# Patient Record
Sex: FEMALE | Race: WHITE | URBAN - METROPOLITAN AREA
[De-identification: names, ages, dates, MRNs, and addresses within clinical notes are randomized per-mention and may not be internally consistent; named-entity substitution may affect disease eponyms.]

---

## 2017-04-27 ENCOUNTER — OFFICE VISIT - HEALTHEAST (OUTPATIENT)
Dept: FAMILY MEDICINE | Facility: CLINIC | Age: 47
End: 2017-04-27

## 2017-04-27 ENCOUNTER — COMMUNICATION - HEALTHEAST (OUTPATIENT)
Dept: TELEHEALTH | Facility: CLINIC | Age: 47
End: 2017-04-27

## 2017-04-27 DIAGNOSIS — F33.1 MDD (MAJOR DEPRESSIVE DISORDER), RECURRENT EPISODE, MODERATE (H): ICD-10-CM

## 2017-04-27 DIAGNOSIS — F90.0 ATTENTION DEFICIT HYPERACTIVITY DISORDER (ADHD), PREDOMINANTLY INATTENTIVE TYPE: ICD-10-CM

## 2017-04-27 DIAGNOSIS — F10.21 ALCOHOL DEPENDENCE IN REMISSION (H): ICD-10-CM

## 2017-04-27 ASSESSMENT — MIFFLIN-ST. JEOR: SCORE: 1218.39

## 2017-05-31 ENCOUNTER — RECORDS - HEALTHEAST (OUTPATIENT)
Dept: ADMINISTRATIVE | Facility: OTHER | Age: 47
End: 2017-05-31

## 2017-06-27 ENCOUNTER — RECORDS - HEALTHEAST (OUTPATIENT)
Dept: ADMINISTRATIVE | Facility: OTHER | Age: 47
End: 2017-06-27

## 2017-06-28 ENCOUNTER — COMMUNICATION - HEALTHEAST (OUTPATIENT)
Dept: FAMILY MEDICINE | Facility: CLINIC | Age: 47
End: 2017-06-28

## 2017-06-28 DIAGNOSIS — F13.10 BENZODIAZEPINE ABUSE, EPISODIC (H): ICD-10-CM

## 2017-06-28 DIAGNOSIS — F33.1 MDD (MAJOR DEPRESSIVE DISORDER), RECURRENT EPISODE, MODERATE (H): ICD-10-CM

## 2017-06-28 DIAGNOSIS — F32.89 DEPRESSIVE DISORDER, NOT ELSEWHERE CLASSIFIED: ICD-10-CM

## 2017-06-29 ENCOUNTER — COMMUNICATION - HEALTHEAST (OUTPATIENT)
Dept: FAMILY MEDICINE | Facility: CLINIC | Age: 47
End: 2017-06-29

## 2017-06-29 DIAGNOSIS — F33.1 MDD (MAJOR DEPRESSIVE DISORDER), RECURRENT EPISODE, MODERATE (H): ICD-10-CM

## 2017-06-29 DIAGNOSIS — F41.1 ANXIETY STATE: ICD-10-CM

## 2017-07-13 ENCOUNTER — OFFICE VISIT - HEALTHEAST (OUTPATIENT)
Dept: FAMILY MEDICINE | Facility: CLINIC | Age: 47
End: 2017-07-13

## 2017-07-13 ENCOUNTER — COMMUNICATION - HEALTHEAST (OUTPATIENT)
Dept: TELEHEALTH | Facility: CLINIC | Age: 47
End: 2017-07-13

## 2017-07-13 DIAGNOSIS — J45.909 ASTHMA: ICD-10-CM

## 2017-07-13 DIAGNOSIS — F33.1 MDD (MAJOR DEPRESSIVE DISORDER), RECURRENT EPISODE, MODERATE (H): ICD-10-CM

## 2017-07-13 DIAGNOSIS — F41.1 ANXIETY STATE: ICD-10-CM

## 2017-07-13 DIAGNOSIS — F10.21 ALCOHOL DEPENDENCE IN REMISSION (H): ICD-10-CM

## 2017-07-13 DIAGNOSIS — Z01.818 PREOP EXAMINATION: ICD-10-CM

## 2017-07-13 ASSESSMENT — MIFFLIN-ST. JEOR: SCORE: 1204.78

## 2017-07-14 ENCOUNTER — COMMUNICATION - HEALTHEAST (OUTPATIENT)
Dept: FAMILY MEDICINE | Facility: CLINIC | Age: 47
End: 2017-07-14

## 2017-07-18 ENCOUNTER — COMMUNICATION - HEALTHEAST (OUTPATIENT)
Dept: FAMILY MEDICINE | Facility: CLINIC | Age: 47
End: 2017-07-18

## 2017-07-18 DIAGNOSIS — F90.0 ATTENTION DEFICIT HYPERACTIVITY DISORDER (ADHD), PREDOMINANTLY INATTENTIVE TYPE: ICD-10-CM

## 2017-07-19 ENCOUNTER — ANESTHESIA - HEALTHEAST (OUTPATIENT)
Dept: SURGERY | Facility: HOSPITAL | Age: 47
End: 2017-07-19

## 2017-07-19 ASSESSMENT — MIFFLIN-ST. JEOR: SCORE: 1204.78

## 2017-07-20 ENCOUNTER — SURGERY - HEALTHEAST (OUTPATIENT)
Dept: SURGERY | Facility: HOSPITAL | Age: 47
End: 2017-07-20

## 2017-07-20 ENCOUNTER — COMMUNICATION - HEALTHEAST (OUTPATIENT)
Dept: FAMILY MEDICINE | Facility: CLINIC | Age: 47
End: 2017-07-20

## 2017-07-21 ENCOUNTER — COMMUNICATION - HEALTHEAST (OUTPATIENT)
Dept: FAMILY MEDICINE | Facility: CLINIC | Age: 47
End: 2017-07-21

## 2017-07-26 ENCOUNTER — COMMUNICATION - HEALTHEAST (OUTPATIENT)
Dept: FAMILY MEDICINE | Facility: CLINIC | Age: 47
End: 2017-07-26

## 2017-08-08 ENCOUNTER — COMMUNICATION - HEALTHEAST (OUTPATIENT)
Dept: FAMILY MEDICINE | Facility: CLINIC | Age: 47
End: 2017-08-08

## 2017-08-08 DIAGNOSIS — F90.0 ATTENTION DEFICIT HYPERACTIVITY DISORDER (ADHD), PREDOMINANTLY INATTENTIVE TYPE: ICD-10-CM

## 2017-08-10 ENCOUNTER — COMMUNICATION - HEALTHEAST (OUTPATIENT)
Dept: FAMILY MEDICINE | Facility: CLINIC | Age: 47
End: 2017-08-10

## 2017-08-10 DIAGNOSIS — F32.89 DEPRESSIVE DISORDER, NOT ELSEWHERE CLASSIFIED: ICD-10-CM

## 2017-09-08 ENCOUNTER — COMMUNICATION - HEALTHEAST (OUTPATIENT)
Dept: FAMILY MEDICINE | Facility: CLINIC | Age: 47
End: 2017-09-08

## 2017-09-08 DIAGNOSIS — F90.0 ATTENTION DEFICIT HYPERACTIVITY DISORDER (ADHD), PREDOMINANTLY INATTENTIVE TYPE: ICD-10-CM

## 2017-09-21 ENCOUNTER — OFFICE VISIT - HEALTHEAST (OUTPATIENT)
Dept: FAMILY MEDICINE | Facility: CLINIC | Age: 47
End: 2017-09-21

## 2017-09-21 DIAGNOSIS — F10.21 ALCOHOL DEPENDENCE IN REMISSION (H): ICD-10-CM

## 2017-09-21 DIAGNOSIS — F32.89 DEPRESSIVE DISORDER, NOT ELSEWHERE CLASSIFIED: ICD-10-CM

## 2017-09-21 DIAGNOSIS — F90.0 ATTENTION DEFICIT HYPERACTIVITY DISORDER (ADHD), PREDOMINANTLY INATTENTIVE TYPE: ICD-10-CM

## 2017-09-21 DIAGNOSIS — Z72.0 TOBACCO ABUSE: ICD-10-CM

## 2017-09-21 DIAGNOSIS — K08.89 TOOTH PAIN: ICD-10-CM

## 2017-09-21 DIAGNOSIS — F41.1 ANXIETY STATE: ICD-10-CM

## 2017-09-21 DIAGNOSIS — Z12.31 VISIT FOR SCREENING MAMMOGRAM: ICD-10-CM

## 2017-09-21 DIAGNOSIS — Z23 IMMUNIZATION DUE: ICD-10-CM

## 2017-09-22 ENCOUNTER — RECORDS - HEALTHEAST (OUTPATIENT)
Dept: ADMINISTRATIVE | Facility: OTHER | Age: 47
End: 2017-09-22

## 2017-09-25 ENCOUNTER — COMMUNICATION - HEALTHEAST (OUTPATIENT)
Dept: FAMILY MEDICINE | Facility: CLINIC | Age: 47
End: 2017-09-25

## 2017-09-25 DIAGNOSIS — L70.9 ACNE: ICD-10-CM

## 2017-09-25 DIAGNOSIS — K08.89 PAIN, DENTAL: ICD-10-CM

## 2017-10-09 ENCOUNTER — COMMUNICATION - HEALTHEAST (OUTPATIENT)
Dept: FAMILY MEDICINE | Facility: CLINIC | Age: 47
End: 2017-10-09

## 2017-10-09 DIAGNOSIS — K08.89 PAIN, DENTAL: ICD-10-CM

## 2017-10-12 ENCOUNTER — COMMUNICATION - HEALTHEAST (OUTPATIENT)
Dept: FAMILY MEDICINE | Facility: CLINIC | Age: 47
End: 2017-10-12

## 2017-10-16 ENCOUNTER — COMMUNICATION - HEALTHEAST (OUTPATIENT)
Dept: SCHEDULING | Facility: CLINIC | Age: 47
End: 2017-10-16

## 2017-11-07 ENCOUNTER — COMMUNICATION - HEALTHEAST (OUTPATIENT)
Dept: FAMILY MEDICINE | Facility: CLINIC | Age: 47
End: 2017-11-07

## 2017-11-09 ENCOUNTER — COMMUNICATION - HEALTHEAST (OUTPATIENT)
Dept: FAMILY MEDICINE | Facility: CLINIC | Age: 47
End: 2017-11-09

## 2017-11-29 ENCOUNTER — COMMUNICATION - HEALTHEAST (OUTPATIENT)
Dept: FAMILY MEDICINE | Facility: CLINIC | Age: 47
End: 2017-11-29

## 2017-12-13 ENCOUNTER — TRANSFERRED RECORDS (OUTPATIENT)
Dept: HEALTH INFORMATION MANAGEMENT | Facility: CLINIC | Age: 47
End: 2017-12-13

## 2017-12-14 ENCOUNTER — HOSPITAL ENCOUNTER (INPATIENT)
Facility: CLINIC | Age: 47
LOS: 7 days | Discharge: HOME | End: 2017-12-21
Attending: PSYCHIATRY & NEUROLOGY | Admitting: PSYCHIATRY & NEUROLOGY
Payer: MEDICAID

## 2017-12-14 DIAGNOSIS — F13.20 SEDATIVE, HYPNOTIC OR ANXIOLYTIC DEPENDENCE (H): Primary | ICD-10-CM

## 2017-12-14 PROBLEM — T42.71XA: Status: ACTIVE | Noted: 2017-12-14

## 2017-12-14 PROCEDURE — 25000132 ZZH RX MED GY IP 250 OP 250 PS 637: Performed by: NURSE PRACTITIONER

## 2017-12-14 PROCEDURE — HZ2ZZZZ DETOXIFICATION SERVICES FOR SUBSTANCE ABUSE TREATMENT: ICD-10-PCS | Performed by: PSYCHIATRY & NEUROLOGY

## 2017-12-14 PROCEDURE — 12800012 ZZH R&B CD MH INTERMEDIATE ADULT

## 2017-12-14 RX ORDER — ACETAMINOPHEN 325 MG/1
650 TABLET ORAL EVERY 4 HOURS PRN
Status: DISCONTINUED | OUTPATIENT
Start: 2017-12-14 | End: 2017-12-21 | Stop reason: HOSPADM

## 2017-12-14 RX ORDER — ONDANSETRON 4 MG/1
4 TABLET, ORALLY DISINTEGRATING ORAL EVERY 6 HOURS PRN
Status: DISCONTINUED | OUTPATIENT
Start: 2017-12-14 | End: 2017-12-21 | Stop reason: HOSPADM

## 2017-12-14 RX ORDER — HYDROXYZINE HYDROCHLORIDE 25 MG/1
25-50 TABLET, FILM COATED ORAL EVERY 4 HOURS PRN
Status: DISCONTINUED | OUTPATIENT
Start: 2017-12-14 | End: 2017-12-21 | Stop reason: HOSPADM

## 2017-12-14 RX ORDER — NICOTINE 21 MG/24HR
1 PATCH, TRANSDERMAL 24 HOURS TRANSDERMAL DAILY
Status: DISCONTINUED | OUTPATIENT
Start: 2017-12-15 | End: 2017-12-21 | Stop reason: HOSPADM

## 2017-12-14 RX ORDER — ESCITALOPRAM OXALATE 10 MG/1
10 TABLET ORAL DAILY
Status: DISCONTINUED | OUTPATIENT
Start: 2017-12-15 | End: 2017-12-15

## 2017-12-14 RX ORDER — PHENOBARBITAL 32.4 MG/1
32.4 TABLET ORAL AT BEDTIME
Status: DISCONTINUED | OUTPATIENT
Start: 2017-12-14 | End: 2017-12-16

## 2017-12-14 RX ORDER — ALUMINA, MAGNESIA, AND SIMETHICONE 2400; 2400; 240 MG/30ML; MG/30ML; MG/30ML
30 SUSPENSION ORAL EVERY 4 HOURS PRN
Status: DISCONTINUED | OUTPATIENT
Start: 2017-12-14 | End: 2017-12-21 | Stop reason: HOSPADM

## 2017-12-14 RX ORDER — BISACODYL 10 MG
10 SUPPOSITORY, RECTAL RECTAL DAILY PRN
Status: DISCONTINUED | OUTPATIENT
Start: 2017-12-14 | End: 2017-12-21 | Stop reason: HOSPADM

## 2017-12-14 RX ORDER — TRAZODONE HYDROCHLORIDE 150 MG/1
150 TABLET ORAL AT BEDTIME
Status: DISCONTINUED | OUTPATIENT
Start: 2017-12-14 | End: 2017-12-21 | Stop reason: HOSPADM

## 2017-12-14 RX ORDER — PHENOBARBITAL 64.8 MG/1
64.8 TABLET ORAL ONCE
Status: COMPLETED | OUTPATIENT
Start: 2017-12-14 | End: 2017-12-14

## 2017-12-14 RX ORDER — PHENOBARBITAL 64.8 MG/1
64.8 TABLET ORAL 4 TIMES DAILY
Status: DISCONTINUED | OUTPATIENT
Start: 2017-12-15 | End: 2017-12-16

## 2017-12-14 RX ORDER — VENLAFAXINE HYDROCHLORIDE 225 MG/1
225 TABLET, EXTENDED RELEASE ORAL
Status: DISCONTINUED | OUTPATIENT
Start: 2017-12-15 | End: 2017-12-15

## 2017-12-14 RX ORDER — GABAPENTIN 300 MG/1
300 CAPSULE ORAL 3 TIMES DAILY
Status: DISCONTINUED | OUTPATIENT
Start: 2017-12-15 | End: 2017-12-21 | Stop reason: HOSPADM

## 2017-12-14 RX ADMIN — PHENOBARBITAL 64.8 MG: 64.8 TABLET ORAL at 18:52

## 2017-12-14 ASSESSMENT — ACTIVITIES OF DAILY LIVING (ADL)
GROOMING: INDEPENDENT
LAUNDRY: WITH SUPERVISION
ORAL_HYGIENE: INDEPENDENT
DRESS: SCRUBS (BEHAVIORAL HEALTH)

## 2017-12-14 NOTE — IP AVS SNAPSHOT
[facility]    [address] [place] [id number]    Phone:  [phone]                                       After Visit Summary   12/14/2017    Mae Holder    MRN: [id number]           After Visit Summary Signature Page     I have received my discharge instructions, and my questions have been answered. I have discussed any challenges I see with this plan with the nurse or doctor.    ..........................................................................................................................................  Patient/Patient Representative Signature      ..........................................................................................................................................  Patient Representative Print Name and Relationship to Patient    ..................................................               ................................................  Date                                            Time    ..........................................................................................................................................  Reviewed by Signature/Title    ...................................................              ..............................................  Date                                                            Time

## 2017-12-14 NOTE — IP AVS SNAPSHOT
MRN:[id number]                      After Visit Summary   12/14/2017    Mae Holder    MRN: [id number]           Thank you!     Thank you for choosing [facility] for your care. Our goal is always to provide you with excellent care.        Patient Information     Date Of Birth          1970        Designated Caregiver       Most Recent Value    Caregiver    Will someone help with your care after discharge? no      About your hospital stay     You were admitted on:  December 14, 2017 You last received care in the:  [facility]    You were discharged on:  December 21, 2017       Who to Call     For medical emergencies, please call 911.  For non-urgent questions about your medical care, please call your primary care provider or clinic, None          Attending Provider     Provider Specialty    Kolton Samson MD Psychiatry       Primary Care Provider    None Specified      Follow-up Appointments     Follow Up ([facility])       Follow up with primary care provider, No primary care provider on file., within 2-4 weeks, for hospital follow- up. The following labs/tests are recommended: TSH, free T4.     Appointments on [facility] and/or [facility] (with [facility] or [facility] provider or service). Call [phone] if you haven't heard regarding these appointments within 7 days of discharge.                  Further instructions from your care team       Behavioral Discharge Planning and Instructions  THANK YOU FOR CHOOSING THE [address]  [phone]    Summary: You were admitted to [facility] on 12/14/17 for detoxification from alcohol and benzodiazepine.  A medical exam was performed that included lab work. You have met with a  and opted to go to [facility].  Please take care and make your recovery a priority Mae! It was a pleasure working with you and the treatment team wishes you the very best in your recovery!   Lisa    Recommendation:  Residential Treatment, psychotherapy, sober support group engagement and active work with a sponsor or  through [facility].    Main Diagnosis: Per Dr. Samson;  1.  Alcohol use disorder, severe.   2.  Sedative hypnotic use disorder.   3.  Major depressive disorder.   4.  Opiate use disorder.     Major Treatments, Procedures and Findings:  You were treated for Alcohol detoxification using Valium administered based on the [facility] protocol and for Benzodiazepine detoxification using Phenobarbital. You were also treated for opioid withdrawal using the medication buprenorphine (this is the active ingredient in the combination medication, Suboxone). As an outpatient you will be prescribed tapers of Suboxone and phenobarbital, please take this medication as prescribed until it is gone. You had a chemical dependency assessment. You had labs drawn and those results were reviewed with you. Please take a copy of your lab work with you to your next primary care physician appointment.    Symptoms to Report:  If you experience more anxiety, confusion, sleeplessness, deep sadness or thoughts of suicide, notify your treatment team or notify your primary care physician. IF ANY OF THE SYMPTOMS YOU ARE EXPERIENCING ARE A MEDICAL EMERGENCY CALL 911 IMMEDIATELY.     Lifestyle Adjustment: Adjust your lifestyle to get enough sleep, relaxation, exercise and good nutrition. Continue to develop healthy coping skills to decrease stress and promote a sober living environment. Do not use mood altering substances including alcohol, illegal drugs or addictive medications other than what is currently prescribed.     Follow-up Appointment:   Dr. Natty Barone on January 23, 3:40 pm  [facility]    Resources:   AA/NA and Sponsors are excellent resources for support and you can find one at any support group meeting.   SMART Recovery - self management for addiction recovery:   www.smartrecovery.org  http://www.aastpaul.org/?topic=8  http://a[place].org/meetings/  http://www.nam[place].org/index.php/meeting-list-pdf  Pathways ~ A Health Crisis Resource & Support Center:  818.950.6246.  http://www.harmreduction.org  [facility] 922-719-6058  Support Group:  AA/NA and Sponsor/support  Crisis Intervention: 852.782.8031 or 076-328-3193 (TTY: 503.553.8904).  Call anytime for help.  National [place] on Mental Illness (www.[place].simon.org): 200.597.5367 or 710-183-1816.  Alcoholics Anonymous (www.alcoholics-anonymous.org): Check your phone book for your local chapter.  Suicide Awareness Voices of Education (SAVE) (www.save.org): 778-477-QRZV (0383)  National Suicide Prevention Line (www.mentalhealthmn.org): 693-854-ZMXV (0810)  Mental Health Consumer/Survivor Network of [place] (www.mhcsn.net): 176.293.9660 or 257-842-0953  Mental Health Association of [place] (www.mentalhealth.org): 486.746.5756 or 352-145-5237   Substance Abuse and Mental Health Services (www.samhsa.gov)    [facility] ([facility])  [facility] connects people seeking recovery to resources that help foster and sustain long-term recovery.  Whether you are seeking resources for treatment, transportation, housing, job training, education, health care or other pathways to recovery, [facility] is a great place to start.  729.785.8997.  www.[facility].org    General Medication Instructions:   See your medication sheet(s) for instructions.   Take all medicines as directed.  Make no changes unless your doctor suggests them.   Go to all your doctor visits.  Be sure to have all your required lab tests. This way, your medicines can be refilled on time.  Do not use any forms of alcohol.    Please Note:  If you have any questions at anytime after you are discharged please call the [facility] detox unit 3AW unit at [phone].  [facility] [place] , Health, Behavioral Intake  "[phone]  Please take this discharge folder with you to all your follow up appointments, it contains your lab results, diagnosis, medication list and discharge recommendations.      THANK YOU FOR CHOOSING THE [facility]       Pending Results     No orders found from 2017 to 12/15/2017.            Statement of Approval     Ordered          17 0839  I have reviewed and agree with all the recommendations and orders detailed in this document.  EFFECTIVE NOW     Approved and electronically signed by:  Kolton Samson MD             Admission Information     Date & Time Provider Department Dept. Phone    2017 Kolton Samson MD [facility] [phone]      Your Vitals Were     Blood Pressure Pulse Temperature Respirations Height Weight    102/71 58 97.6  F (36.4  C) (Oral) 16 1.626 m (5' 4\") 59 kg (130 lb)    BMI (Body Mass Index)                   22.31 kg/m2           MyChart Information     [facility] lets you send messages to your doctor, view your test results, renew your prescriptions, schedule appointments and more. To sign up, go to [email or web address] . Click on \"Log in\" on the left side of the screen, which will take you to the Welcome page. Then click on \"Sign up Now\" on the right side of the page.     You will be asked to enter the access code listed below, as well as some personal information. Please follow the directions to create your username and password.     Your access code is: [email or web address]  Expires: 3/21/2018 10:09 AM     Your access code will  in 90 days. If you need help or a new code, please call your [facility] clinic or [phone].        Care EveryWhere ID     This is your Care EveryWhere ID. This could be used by other organizations to access your [facility] medical records  [id number]        Equal Access to Services     LOUISE MARQUEZ AH: edita Cerna, yoselin burgess, " ismael strong hayaan adeeg kharash la'aan ah. So [facility] [phone].    ATENCIÓN: Si habla adriane, tiene a polo disposición servicios gratuitos de asistencia lingüística. Lesly al [phone].    We comply with applicable federal civil rights laws and [place] laws. We do not discriminate on the basis of race, color, national origin, age, disability, sex, sexual orientation, or gender identity.               Review of your medicines      START taking        Dose / Directions    albuterol 108 (90 BASE) MCG/ACT Inhaler   Commonly known as:  PROAIR HFA/PROVENTIL HFA/VENTOLIN HFA   Used for:  Sedative, hypnotic or anxiolytic dependence (H)        Dose:  2 puff   Inhale 2 puffs into the lungs 4 times daily as needed for other (dyspnea)   Quantity:  1 Inhaler   Refills:  0       clindamycin 300 MG capsule   Commonly known as:  CLEOCIN   Indication:  Tooth infection   Used for:  Sedative, hypnotic or anxiolytic dependence (H)        Dose:  300 mg   Take 1 capsule (300 mg) by mouth every 6 hours for 1 day   Quantity:  4 capsule   Refills:  0       hydrOXYzine 25 MG tablet   Commonly known as:  ATARAX   Used for:  Sedative, hypnotic or anxiolytic dependence (H)        Dose:  25-50 mg   Take 1-2 tablets (25-50 mg) by mouth every 4 hours as needed for anxiety   Quantity:  120 tablet   Refills:  0       multivitamin, therapeutic with minerals Tabs tablet   Used for:  Sedative, hypnotic or anxiolytic dependence (H)        Dose:  1 tablet   Take 1 tablet by mouth daily   Quantity:  30 each   Refills:  0       nicotine 21 MG/24HR 24 hr patch   Commonly known as:  NICODERM CQ   Used for:  Sedative, hypnotic or anxiolytic dependence (H)        Dose:  1 patch   Place 1 patch onto the skin daily   Quantity:  30 patch   Refills:  0       PHENobarbital 32.4 MG Tabs tablet   Commonly known as:  LUMINAL   Used for:  Sedative, hypnotic or anxiolytic dependence (H)        Dose:  32.4 mg   Start taking on:  12/22/2017   Take 1 tablet (32.4 mg)  by mouth once for 1 dose   Quantity:  1 tablet   Refills:  0       venlafaxine 37.5 MG 24 hr capsule   Commonly known as:  EFFEXOR XR   Used for:  Sedative, hypnotic or anxiolytic dependence (H)   Replaces:  EFFEXOR PO        Dose:  37.5 mg   Take 1 capsule (37.5 mg) by mouth daily   Quantity:  30 capsule   Refills:  0         CONTINUE these medicines which may have CHANGED, or have new prescriptions. If we are uncertain of the size of tablets/capsules you have at home, strength may be listed as something that might have changed.        Dose / Directions    escitalopram 20 MG tablet   Commonly known as:  LEXAPRO   This may have changed:    - medication strength  - how much to take   Used for:  Sedative, hypnotic or anxiolytic dependence (H)        Dose:  20 mg   Take 1 tablet (20 mg) by mouth daily   Quantity:  30 tablet   Refills:  0       gabapentin 300 MG capsule   Commonly known as:  NEURONTIN   This may have changed:  medication strength   Used for:  Sedative, hypnotic or anxiolytic dependence (H)        Dose:  300 mg   Take 1 capsule (300 mg) by mouth 3 times daily   Quantity:  90 capsule   Refills:  0       traZODone 50 MG tablet   Commonly known as:  DESYREL   This may have changed:    - how much to take  - when to take this  - reasons to take this   Used for:  Sedative, hypnotic or anxiolytic dependence (H)        Dose:   mg   Take 1-3 tablets ( mg) by mouth At Bedtime   Quantity:  90 tablet   Refills:  0         STOP taking     ABILIFY PO           EFFEXOR PO   Replaced by:  venlafaxine 37.5 MG 24 hr capsule           SEROQUEL PO           ZOFRAN PO                Where to get your medicines      These medications were sent to [place], [place] - [address]  [address] [address]     Phone:  [phone]     albuterol 108 (90 BASE) MCG/ACT Inhaler    clindamycin 300 MG capsule    escitalopram 20 MG tablet    gabapentin 300 MG capsule     hydrOXYzine 25 MG tablet    multivitamin, therapeutic with minerals Tabs tablet    nicotine 21 MG/24HR 24 hr patch    traZODone 50 MG tablet    venlafaxine 37.5 MG 24 hr capsule         Some of these will need a paper prescription and others can be bought over the counter. Ask your nurse if you have questions.     Bring a paper prescription for each of these medications     PHENobarbital 32.4 MG Tabs tablet               ANTIBIOTIC INSTRUCTION     You've Been Prescribed an Antibiotic - Now What?  Your healthcare team thinks that you or your loved one might have an infection. Some infections can be treated with antibiotics, which are powerful, life-saving drugs. Like all medications, antibiotics have side effects and should only be used when necessary. There are some important things you should know about your antibiotic treatment.      Your healthcare team may run tests before you start taking an antibiotic.    Your team may take samples (e.g., from your blood, urine or other areas) to run tests to look for bacteria. These test can be important to determine if you need an antibiotic at all and, if you do, which antibiotic will work best.      Within a few days, your healthcare team might change or even stop your antibiotic.    Your team may start you on an antibiotic while they are working to find out what is making you sick.    Your team might change your antibiotic because test results show that a different antibiotic would be better to treat your infection.    In some cases, once your team has more information, they learn that you do not need an antibiotic at all. They may find out that you don't have an infection, or that the antibiotic you're taking won't work against your infection. For example, an infection caused by a virus can't be treated with antibiotics. Staying on an antibiotic when you don't need it is more likely to be harmful than helpful.      You may experience side effects from your  antibiotic.    Like all medications, antibiotics have side effects. Some of these can be serious.    Let you healthcare team know if you have any known allergies when you are admitted to the hospital.    One significant side effect of nearly all antibiotics is the risk of severe and sometimes deadly diarrhea caused by Clostridium difficile (C. Difficile). This occurs when a person takes antibiotics because some good germs are destroyed. Antibiotic use allows C. diificile to take over, putting patients at high risk for this serious infection.    As a patient or caregiver, it is important to understand your or your loved one's antibiotic treatment. It is especially important for caregivers to speak up when patients can't speak for themselves. Here are some important questions to ask your healthcare team.    What infection is this antibiotic treating and how do you know I have that infection?    What side effects might occur from this antibiotic?    How long will I need to take this antibiotic?    Is it safe to take this antibiotic with other medications or supplements (e.g., vitamins) that I am taking?     Are there any special directions I need to know about taking this antibiotic? For example, should I take it with food?    How will I be monitored to know whether my infection is responding to the antibiotic?    What tests may help to make sure the right antibiotic is prescribed for me?      Information provided by:  www.cdc.gov/getsmart  U.S. Department of Health and Human Services  Centers for disease Control and Prevention  National Center for Emerging and Zoonotic Infectious Diseases  Division of Healthcare Quality Promotion         Protect others around you: Learn how to safely use, store and throw away your medicines at www.disposemymeds.org.             Medication List: This is a list of all your medications and when to take them. Check marks below indicate your daily home schedule. Keep this list as a  reference.      Medications           Morning Afternoon Evening Bedtime As Needed    albuterol 108 (90 BASE) MCG/ACT Inhaler   Commonly known as:  PROAIR HFA/PROVENTIL HFA/VENTOLIN HFA   Inhale 2 puffs into the lungs 4 times daily as needed for other (dyspnea)                            Up to 4 times daily         clindamycin 300 MG capsule   Commonly known as:  CLEOCIN   Take 1 capsule (300 mg) by mouth every 6 hours for 1 day   Last time this was given:  300 mg on 12/21/2017  5:45 AM                                            escitalopram 20 MG tablet   Commonly known as:  LEXAPRO   Take 1 tablet (20 mg) by mouth daily   Last time this was given:  20 mg on 12/21/2017  8:57 AM                                   gabapentin 300 MG capsule   Commonly known as:  NEURONTIN   Take 1 capsule (300 mg) by mouth 3 times daily   Last time this was given:  300 mg on 12/21/2017  8:57 AM                                         hydrOXYzine 25 MG tablet   Commonly known as:  ATARAX   Take 1-2 tablets (25-50 mg) by mouth every 4 hours as needed for anxiety   Last time this was given:  50 mg on 12/19/2017  8:35 PM                            Up to 4 times per day       multivitamin, therapeutic with minerals Tabs tablet   Take 1 tablet by mouth daily   Last time this was given:  1 tablet on 12/21/2017  8:57 AM                                   nicotine 21 MG/24HR 24 hr patch   Commonly known as:  NICODERM CQ   Place 1 patch onto the skin daily   Last time this was given:  1 patch on 12/21/2017  8:56 AM                                   PHENobarbital 32.4 MG Tabs tablet   Commonly known as:  LUMINAL   Take 1 tablet (32.4 mg) by mouth once for 1 dose   Start taking on:  12/22/2017   Last time this was given:  64.8 mg on 12/21/2017  8:57 AM                                   traZODone 50 MG tablet   Commonly known as:  DESYREL   Take 1-3 tablets ( mg) by mouth At Bedtime   Last time this was given:  150 mg on 12/20/2017  9:02 PM                                    venlafaxine 37.5 MG 24 hr capsule   Commonly known as:  EFFEXOR XR   Take 1 capsule (37.5 mg) by mouth daily

## 2017-12-15 LAB
ALBUMIN SERPL-MCNC: 3.9 G/DL (ref 3.4–5)
ALBUMIN UR-MCNC: NEGATIVE MG/DL
ALP SERPL-CCNC: 94 U/L (ref 40–150)
ALT SERPL W P-5'-P-CCNC: 16 U/L (ref 0–50)
AMPHETAMINES UR QL SCN: NEGATIVE
ANION GAP SERPL CALCULATED.3IONS-SCNC: 8 MMOL/L (ref 3–14)
APPEARANCE UR: CLEAR
AST SERPL W P-5'-P-CCNC: 14 U/L (ref 0–45)
BACTERIA #/AREA URNS HPF: ABNORMAL /HPF
BARBITURATES UR QL: POSITIVE
BASOPHILS # BLD AUTO: 0 10E9/L (ref 0–0.2)
BASOPHILS NFR BLD AUTO: 0.4 %
BENZODIAZ UR QL: NEGATIVE
BILIRUB SERPL-MCNC: 0.7 MG/DL (ref 0.2–1.3)
BILIRUB UR QL STRIP: NEGATIVE
BUN SERPL-MCNC: 11 MG/DL (ref 7–30)
CALCIUM SERPL-MCNC: 9 MG/DL (ref 8.5–10.1)
CANNABINOIDS UR QL SCN: NEGATIVE
CHLORIDE SERPL-SCNC: 103 MMOL/L (ref 94–109)
CHOLEST SERPL-MCNC: 196 MG/DL
CO2 SERPL-SCNC: 25 MMOL/L (ref 20–32)
COCAINE UR QL: NEGATIVE
COLOR UR AUTO: ABNORMAL
CREAT SERPL-MCNC: 0.7 MG/DL (ref 0.52–1.04)
DIFFERENTIAL METHOD BLD: NORMAL
EOSINOPHIL # BLD AUTO: 0.2 10E9/L (ref 0–0.7)
EOSINOPHIL NFR BLD AUTO: 1.8 %
ERYTHROCYTE [DISTWIDTH] IN BLOOD BY AUTOMATED COUNT: 14.5 % (ref 10–15)
ETHANOL UR QL SCN: NEGATIVE
GFR SERPL CREATININE-BSD FRML MDRD: >90 ML/MIN/1.7M2
GGT SERPL-CCNC: 22 U/L (ref 0–40)
GLUCOSE SERPL-MCNC: 85 MG/DL (ref 70–99)
GLUCOSE UR STRIP-MCNC: NEGATIVE MG/DL
HCG UR QL: NEGATIVE
HCT VFR BLD AUTO: 40.7 % (ref 35–47)
HDLC SERPL-MCNC: 98 MG/DL
HGB BLD-MCNC: 13.3 G/DL (ref 11.7–15.7)
HGB UR QL STRIP: NEGATIVE
IMM GRANULOCYTES # BLD: 0 10E9/L (ref 0–0.4)
IMM GRANULOCYTES NFR BLD: 0.4 %
KETONES UR STRIP-MCNC: NEGATIVE MG/DL
LDLC SERPL CALC-MCNC: 83 MG/DL
LEUKOCYTE ESTERASE UR QL STRIP: NEGATIVE
LYMPHOCYTES # BLD AUTO: 2.1 10E9/L (ref 0.8–5.3)
LYMPHOCYTES NFR BLD AUTO: 20.6 %
MCH RBC QN AUTO: 29.5 PG (ref 26.5–33)
MCHC RBC AUTO-ENTMCNC: 32.7 G/DL (ref 31.5–36.5)
MCV RBC AUTO: 90 FL (ref 78–100)
MONOCYTES # BLD AUTO: 0.7 10E9/L (ref 0–1.3)
MONOCYTES NFR BLD AUTO: 6.9 %
NEUTROPHILS # BLD AUTO: 7.1 10E9/L (ref 1.6–8.3)
NEUTROPHILS NFR BLD AUTO: 69.9 %
NITRATE UR QL: NEGATIVE
NONHDLC SERPL-MCNC: 98 MG/DL
NRBC # BLD AUTO: 0 10*3/UL
NRBC BLD AUTO-RTO: 0 /100
OPIATES UR QL SCN: NEGATIVE
PH UR STRIP: 6 PH (ref 5–7)
PLATELET # BLD AUTO: 438 10E9/L (ref 150–450)
POTASSIUM SERPL-SCNC: 4 MMOL/L (ref 3.4–5.3)
PROT SERPL-MCNC: 7.6 G/DL (ref 6.8–8.8)
RBC # BLD AUTO: 4.51 10E12/L (ref 3.8–5.2)
RBC #/AREA URNS AUTO: <1 /HPF (ref 0–2)
SODIUM SERPL-SCNC: 136 MMOL/L (ref 133–144)
SOURCE: ABNORMAL
SP GR UR STRIP: 1 (ref 1–1.03)
SQUAMOUS #/AREA URNS AUTO: 1 /HPF (ref 0–1)
T4 FREE SERPL-MCNC: 1.12 NG/DL (ref 0.76–1.46)
TRIGL SERPL-MCNC: 76 MG/DL
TSH SERPL DL<=0.005 MIU/L-ACNC: 0.3 MU/L (ref 0.4–4)
UROBILINOGEN UR STRIP-MCNC: NORMAL MG/DL (ref 0–2)
VIT B12 SERPL-MCNC: 710 PG/ML (ref 193–986)
WBC # BLD AUTO: 10.2 10E9/L (ref 4–11)
WBC #/AREA URNS AUTO: <1 /HPF (ref 0–2)

## 2017-12-15 PROCEDURE — 80307 DRUG TEST PRSMV CHEM ANLYZR: CPT | Performed by: PSYCHIATRY & NEUROLOGY

## 2017-12-15 PROCEDURE — 12800012 ZZH R&B CD MH INTERMEDIATE ADULT

## 2017-12-15 PROCEDURE — 85025 COMPLETE CBC W/AUTO DIFF WBC: CPT | Performed by: PSYCHIATRY & NEUROLOGY

## 2017-12-15 PROCEDURE — 99221 1ST HOSP IP/OBS SF/LOW 40: CPT | Mod: AI | Performed by: PSYCHIATRY & NEUROLOGY

## 2017-12-15 PROCEDURE — 25000132 ZZH RX MED GY IP 250 OP 250 PS 637: Performed by: PSYCHIATRY & NEUROLOGY

## 2017-12-15 PROCEDURE — 81025 URINE PREGNANCY TEST: CPT | Performed by: PSYCHIATRY & NEUROLOGY

## 2017-12-15 PROCEDURE — 84443 ASSAY THYROID STIM HORMONE: CPT | Performed by: PSYCHIATRY & NEUROLOGY

## 2017-12-15 PROCEDURE — 36415 COLL VENOUS BLD VENIPUNCTURE: CPT | Performed by: PSYCHIATRY & NEUROLOGY

## 2017-12-15 PROCEDURE — 99207 ZZC DOWN CODE DUE TO INITIAL EXAM: CPT | Performed by: PSYCHIATRY & NEUROLOGY

## 2017-12-15 PROCEDURE — 80061 LIPID PANEL: CPT | Performed by: PSYCHIATRY & NEUROLOGY

## 2017-12-15 PROCEDURE — 25000132 ZZH RX MED GY IP 250 OP 250 PS 637: Performed by: NURSE PRACTITIONER

## 2017-12-15 PROCEDURE — 81001 URINALYSIS AUTO W/SCOPE: CPT | Performed by: PSYCHIATRY & NEUROLOGY

## 2017-12-15 PROCEDURE — 25000125 ZZHC RX 250: Performed by: NURSE PRACTITIONER

## 2017-12-15 PROCEDURE — 82607 VITAMIN B-12: CPT | Performed by: PSYCHIATRY & NEUROLOGY

## 2017-12-15 PROCEDURE — 25000132 ZZH RX MED GY IP 250 OP 250 PS 637: Performed by: PHYSICIAN ASSISTANT

## 2017-12-15 PROCEDURE — 99222 1ST HOSP IP/OBS MODERATE 55: CPT | Performed by: PHYSICIAN ASSISTANT

## 2017-12-15 PROCEDURE — 80320 DRUG SCREEN QUANTALCOHOLS: CPT | Performed by: PSYCHIATRY & NEUROLOGY

## 2017-12-15 PROCEDURE — 80053 COMPREHEN METABOLIC PANEL: CPT | Performed by: PSYCHIATRY & NEUROLOGY

## 2017-12-15 PROCEDURE — 84439 ASSAY OF FREE THYROXINE: CPT | Performed by: PSYCHIATRY & NEUROLOGY

## 2017-12-15 PROCEDURE — 99207 ZZC CONSULT E&M CHANGED TO INITIAL LEVEL: CPT | Performed by: PHYSICIAN ASSISTANT

## 2017-12-15 PROCEDURE — 82977 ASSAY OF GGT: CPT | Performed by: PSYCHIATRY & NEUROLOGY

## 2017-12-15 RX ORDER — TRAZODONE HYDROCHLORIDE 100 MG/1
100 TABLET ORAL AT BEDTIME
Status: DISCONTINUED | OUTPATIENT
Start: 2017-12-15 | End: 2017-12-15

## 2017-12-15 RX ORDER — FOLIC ACID 1 MG/1
1 TABLET ORAL DAILY
Status: DISCONTINUED | OUTPATIENT
Start: 2017-12-15 | End: 2017-12-21 | Stop reason: HOSPADM

## 2017-12-15 RX ORDER — CLINDAMYCIN HCL 300 MG
300 CAPSULE ORAL EVERY 6 HOURS SCHEDULED
Status: DISCONTINUED | OUTPATIENT
Start: 2017-12-15 | End: 2017-12-21 | Stop reason: HOSPADM

## 2017-12-15 RX ORDER — BUPRENORPHINE 2 MG/1
2 TABLET SUBLINGUAL 4 TIMES DAILY
Status: DISCONTINUED | OUTPATIENT
Start: 2017-12-15 | End: 2017-12-16

## 2017-12-15 RX ORDER — MULTIPLE VITAMINS W/ MINERALS TAB 9MG-400MCG
1 TAB ORAL DAILY
Status: DISCONTINUED | OUTPATIENT
Start: 2017-12-15 | End: 2017-12-21 | Stop reason: HOSPADM

## 2017-12-15 RX ORDER — BUPRENORPHINE 2 MG/1
2 TABLET SUBLINGUAL 3 TIMES DAILY
Status: DISCONTINUED | OUTPATIENT
Start: 2017-12-16 | End: 2017-12-18

## 2017-12-15 RX ORDER — ALBUTEROL SULFATE 90 UG/1
2 AEROSOL, METERED RESPIRATORY (INHALATION) 4 TIMES DAILY PRN
Status: DISCONTINUED | OUTPATIENT
Start: 2017-12-15 | End: 2017-12-21 | Stop reason: HOSPADM

## 2017-12-15 RX ORDER — NALOXONE HYDROCHLORIDE 0.4 MG/ML
.1-.4 INJECTION, SOLUTION INTRAMUSCULAR; INTRAVENOUS; SUBCUTANEOUS
Status: DISCONTINUED | OUTPATIENT
Start: 2017-12-15 | End: 2017-12-21 | Stop reason: HOSPADM

## 2017-12-15 RX ORDER — LANOLIN ALCOHOL/MO/W.PET/CERES
100 CREAM (GRAM) TOPICAL DAILY
Status: COMPLETED | OUTPATIENT
Start: 2017-12-15 | End: 2017-12-17

## 2017-12-15 RX ORDER — DIAZEPAM 5 MG
5-20 TABLET ORAL EVERY 30 MIN PRN
Status: DISCONTINUED | OUTPATIENT
Start: 2017-12-15 | End: 2017-12-21 | Stop reason: HOSPADM

## 2017-12-15 RX ORDER — BUPRENORPHINE 2 MG/1
2 TABLET SUBLINGUAL 4 TIMES DAILY PRN
Status: DISCONTINUED | OUTPATIENT
Start: 2017-12-15 | End: 2017-12-15

## 2017-12-15 RX ORDER — ESCITALOPRAM OXALATE 20 MG/1
20 TABLET ORAL DAILY
Status: DISCONTINUED | OUTPATIENT
Start: 2017-12-16 | End: 2017-12-21 | Stop reason: HOSPADM

## 2017-12-15 RX ORDER — IBUPROFEN 600 MG/1
600 TABLET, FILM COATED ORAL EVERY 6 HOURS PRN
Status: DISCONTINUED | OUTPATIENT
Start: 2017-12-15 | End: 2017-12-21 | Stop reason: HOSPADM

## 2017-12-15 RX ORDER — VENLAFAXINE HYDROCHLORIDE 150 MG/1
150 TABLET, EXTENDED RELEASE ORAL
Status: DISCONTINUED | OUTPATIENT
Start: 2017-12-16 | End: 2017-12-18

## 2017-12-15 RX ADMIN — BUPRENORPHINE HCL 2 MG: 2 TABLET SUBLINGUAL at 16:17

## 2017-12-15 RX ADMIN — FOLIC ACID 1 MG: 1 TABLET ORAL at 11:28

## 2017-12-15 RX ADMIN — PHENOBARBITAL 64.8 MG: 64.8 TABLET ORAL at 16:17

## 2017-12-15 RX ADMIN — PHENOBARBITAL 64.8 MG: 64.8 TABLET ORAL at 11:28

## 2017-12-15 RX ADMIN — BUPRENORPHINE HCL 2 MG: 2 TABLET SUBLINGUAL at 21:01

## 2017-12-15 RX ADMIN — NICOTINE 1 PATCH: 21 PATCH, EXTENDED RELEASE TRANSDERMAL at 08:29

## 2017-12-15 RX ADMIN — PHENOBARBITAL 32.4 MG: 32.4 TABLET ORAL at 20:56

## 2017-12-15 RX ADMIN — GABAPENTIN 300 MG: 300 CAPSULE ORAL at 20:58

## 2017-12-15 RX ADMIN — PHENOBARBITAL 64.8 MG: 64.8 TABLET ORAL at 08:29

## 2017-12-15 RX ADMIN — MULTIPLE VITAMINS W/ MINERALS TAB 1 TABLET: TAB at 11:28

## 2017-12-15 RX ADMIN — GABAPENTIN 300 MG: 300 CAPSULE ORAL at 13:53

## 2017-12-15 RX ADMIN — DIAZEPAM 10 MG: 5 TABLET ORAL at 11:28

## 2017-12-15 RX ADMIN — ONDANSETRON 4 MG: 4 TABLET, ORALLY DISINTEGRATING ORAL at 10:44

## 2017-12-15 RX ADMIN — CLINDAMYCIN HYDROCHLORIDE 300 MG: 300 CAPSULE ORAL at 13:53

## 2017-12-15 RX ADMIN — HYDROXYZINE HYDROCHLORIDE 50 MG: 25 TABLET ORAL at 03:47

## 2017-12-15 RX ADMIN — Medication 100 MG: at 11:28

## 2017-12-15 RX ADMIN — CLINDAMYCIN HYDROCHLORIDE 300 MG: 300 CAPSULE ORAL at 20:57

## 2017-12-15 RX ADMIN — GABAPENTIN 300 MG: 300 CAPSULE ORAL at 08:29

## 2017-12-15 RX ADMIN — IBUPROFEN 600 MG: 600 TABLET ORAL at 10:44

## 2017-12-15 RX ADMIN — ESCITALOPRAM OXALATE 10 MG: 10 TABLET ORAL at 08:29

## 2017-12-15 RX ADMIN — VENLAFAXINE HYDROCHLORIDE 225 MG: 225 TABLET, FILM COATED, EXTENDED RELEASE ORAL at 08:29

## 2017-12-15 RX ADMIN — TRAZODONE HYDROCHLORIDE 150 MG: 150 TABLET ORAL at 22:23

## 2017-12-15 RX ADMIN — HYDROXYZINE HYDROCHLORIDE 50 MG: 25 TABLET ORAL at 10:44

## 2017-12-15 RX ADMIN — HYDROXYZINE HYDROCHLORIDE 50 MG: 25 TABLET ORAL at 06:31

## 2017-12-15 ASSESSMENT — ACTIVITIES OF DAILY LIVING (ADL)
LAUNDRY: WITH SUPERVISION
ORAL_HYGIENE: INDEPENDENT
DRESS: STREET CLOTHES
GROOMING: INDEPENDENT

## 2017-12-15 NOTE — PROGRESS NOTES
Pt reports feeling anxious and depressed, loneliness, powerless and sadness this morning. At times she was visibly crying when she was getting her assessment done. Denies any suicidal ideation plans or intent. Reports having a bed waiting for her at the [facility] once detox is completed. Pt received 10 mg valium per [facility] protocol along with scheduled phenobarbital and gabapentin. She did report feeling better in the afternoon and her affect appears a bit brighter but still depressed.

## 2017-12-15 NOTE — PROGRESS NOTES
"Pt reports \"I think  I have a uti\". Asked her what kind of symptoms she is having and she reports \"It burns\" and \"It smells\" for her urination. States symptoms began \"yesterday\". Internal Medicine updated.  "

## 2017-12-15 NOTE — PROGRESS NOTES
Case Management Note  12/15/2017    Writer met with pt to initiate discharge planning. Pt reports she already has a bed reserved at [facility]. Pt reports she requires no case management, assessment and/or referral at this time. Pt signed a LATANYA for [facility]. Pt advised to seek assistance if needed.    Writer spoke with Karen (Women's Admission Coordinator) from [facility]. She confirmed pt does have a bed reserved. She asked that pt be fully detoxed from benzodiazepine. She needs to be medically stable (SI's). She needs to bring all of her medications. They do take admissions on the weekends. Pt will need to provide her own transportation and it cannot be a cab.     Jose Martin Alcala MA, LADC

## 2017-12-15 NOTE — PROGRESS NOTES
47 year old  female admitted for detox from benzos., ativan. Had relapsed on etoh in late Oct. After seeing early recovering BF who moved to [place]. Had been sober x 9 months. Hx of 6 detoxes and 4 treatments over the years and last treatment was in April 2017. Longest sobriety 2.5 years. 1 MH hx at hospital in 1995 for depression. Denies being suicidal yesterday or ever in her life. Says she wanted to go to the [facility] but was told that suicidal statements would keep her in an ER until bed available in detox from benzos. 7 yaer relationship is ending and patient is very sad though describes bf as a narcissist as well as CD. Lived in [place] and works at [employer] but gave up her apt. And stayed with a friend up [place] for 3 weeks. Had ER visit  And afterwards was prescribed ativan 2 mg q 6 hours prn but for 3 weeks took at least 2 mg q 4 hours day and night due to sadness and anxiety. Took 6 mg before admission to [facility] yesterday at 1300. Remained there until bed available here. Got ativan 1 mg at 1000 and 1600 today. Reports no other medical problems other than etoh, benzos., depression, anxiety. Did drink 1.5 liters vodka x 3 weeks in relapse and also would take oxycodone or vicoden a few once per week from friends the last 3 weeks. Smokes 1-2 ppd nicotine. Oriented to unit , orders obtained, was given supper and 1st dose of phenobarb. Hx of 3 DUI's and 20 days in senior living. Will need help with housing after the [facility].

## 2017-12-15 NOTE — PROGRESS NOTES
12/14/17 1849   Patient Belongings   Did you bring any home meds/supplements to the hospital?  No   Disposition of Belongings Other (see comment)   Belongings Search Yes   Clothing Search Yes   Second Staff Dhruv   General Info Comment SEE NOTES     BIN: [place],scalf, bag w/ toiletries, gummy-bag  12/19/17 Pt's visitor brought in a white coat, tan tote bag, cigarettes, and a shoe shaped perfume bottle     LOCKED DRAWER: Phone, ring    A               Admission:  I am responsible for any personal items that are not sent to the safe or pharmacy.  [facility] is not responsible for loss, theft or damage of any property in my possession.    Signature:  _________________________________ Date: _______  Time: _____                                              Staff Signature:  ____________________________ Date: ________  Time: _____      2nd Staff person, if patient is unable/unwilling to sign:    Signature: ________________________________ Date: ________  Time: _____     Discharge:  [facility] has returned all of my personal belongings:    Signature: _________________________________ Date: ________  Time: _____                                          Staff Signature:  ____________________________ Date: ________  Time: _____            12/20/17  2 tote bags with hair products, make up products, cosmetics,  electric hair curler, pink winter hat, 2 pairs of boots, 1 pair tennis shoes, pj pants, bra, cigarettes.

## 2017-12-15 NOTE — CONSULTS
"  Internal Medicine Initial Visit    Mae Holder MRN# [id number]   Age: 47 year old YOB: 1970   Date of Admission: 12/14/2017     Reason for consult: Hypotension, Dental Pain       Requesting physician JACE Aceves CNP      SUBJECTIVE   HPI:   Mae Holder is a 47 year old female with history of depression, anxiety, and substance abuse admitted to station 3A for acute alcohol and benzodiazepine withdrawal. Medicine was consulted to co-manage patient's HOTN and dental pain.    Patient presented to [facility] ED on 12/13 with suicidal ideation. Had plans to overdose on benzodiazepines and alcohol. Subsequently transported to [facility] on 12/14 for acute alcohol/benzo withdrawals as patient reports drinking up to 1 liter of liquor daily and taking her prescribed ativan inappropriately. Reports history of withdrawal seizure from alcohol in 2010.    Currently, patient is very tearful. Feels very frustrated that she keeps relapsing and destroying her relationships because of alcohol. Notes she is \"tired of fighting\" and \"doesn't want to hurt anymore\", but denies any suicidal thoughts. States she made suicidal comments in the ED as she felt it was the only way to get help. Reports having had 3 teeth extracted on Wednesday and believes she is supposed to be taking an antibiotic that begins with a \"c\". Has not received any doses as she has been in the ED and hospital. Complains of left-sided jaw pain where the extractions took place and has pain with speaking and eating. Overall feels \"like crap\", endorses withdrawal symptoms including sweats, muscle aches, and severe anxiety. Unable to identify if she has fevers or chills as she states \"there is too much going on\".    Past Medical History:     Past Medical History:   Diagnosis Date     Anxiety      Asthma      Depression      Substance abuse       Reviewed & updated in [facility].     Past Surgical History:      Past Surgical History:   Procedure Laterality " "Date     BUNIONECTOMY  2004, 2010     HYSTERECTOMY  07/2017    Partial      Reviewed & updated in Epic.     Medications prior to admission:     Prior to Admission Medications   Prescriptions Last Dose Informant Patient Reported? Taking?   ARIPiprazole (ABILIFY PO) More than a month at Unknown time  Yes No   Escitalopram Oxalate (LEXAPRO PO)   Yes Yes   Sig: Take 10 mg by mouth daily   GABAPENTIN PO   Yes Yes   Sig: Take 300 mg by mouth 3 times daily   Ondansetron HCl (ZOFRAN PO) More than a month at Unknown time  Yes No   QUEtiapine Fumarate (SEROQUEL PO)   Yes No   TRAZODONE HCL PO More than a month at Unknown time  Yes No   Sig: Take 100 mg by mouth nightly as needed for sleep   Venlafaxine HCl (EFFEXOR PO)   Yes No      Facility-Administered Medications: None         Allergies:   Not on File      Social History:   Tobacco Use: Smokes 2 packs per day  Alcohol Use: Reports drinking 1 liter daily over past 3 weeks  Illicit Drug Use: Reports using opiates and benzo's; no IVDU  STI Testing: Declines at this time     Family History:     Family History   Problem Relation Age of Onset     Lung Cancer Paternal Grandfather       Reviewed & updated in Epic.     Review of Systems:   Ten point review of systems negative except as stated above in History of Present Illness.    OBJECTIVE   Physical Exam:   Blood pressure 125/87, pulse 94, temperature 98.3  F (36.8  C), temperature source Oral, resp. rate 16, height 1.626 m (5' 4\"), weight 59 kg (130 lb).  General: Tearful  female sitting upright in chair.   HEENT: NC/AT. Anicteric sclera. Mucous membranes moist. Several tooth extraction sites along left mandible/maxilla with associated moderate gingival swelling and erythema. No areas of fluctuance concerning for abscess.   Neck: Supple  Cardiovascular: RRR. S1/S2. No murmurs appreciated.  Lungs: Normal respiratory effort on RA. Lungs CTAB without wheezes, rales, or rhonchi.  Abdomen: Soft, non-tender, and nondistended " with normoactive bowel sounds.  Extremities: Warm & well perfused. No peripheral edema.  Skin: No rashes, jaundice, or lesions on exposed areas of skin.  Neurologic: A&O X 3. Answers questions appropriately. Moves all extremities symmetrically.     Laboratory Data:   CMP    Recent Labs  Lab 12/15/17  1206      POTASSIUM 4.0   CHLORIDE 103   CO2 25   ANIONGAP 8   GLC 85   BUN 11   CR 0.70   GFRESTIMATED >90   GFRESTBLACK >90   NASH 9.0   PROTTOTAL 7.6   ALBUMIN 3.9   BILITOTAL 0.7   ALKPHOS 94   AST 14   ALT 16       CBC    Recent Labs  Lab 12/15/17  1206   WBC 10.2   RBC 4.51   HGB 13.3   HCT 40.7   MCV 90   MCH 29.5   MCHC 32.7   RDW 14.5          TSH    Recent Labs  Lab 12/15/17  1206   TSH 0.30*          Assessment and Plan/Recommendations:   Mae Holder is a 47 year old female with history of depression, anxiety, and substance abuse admitted to station 3A for acute alcohol and benzodiazepine withdrawal.    Depression, Anxiety, Acute Alcohol and Benzodiazepine Withdrawal. Reports drinking up to 1 liter per day over the past 3 weeks. History of withdrawal seizure in 2010. U tox + for barbituates. On MSSA protocol.  - Continue MVI, folate, thiamine  - Seizure precautions  - Management per Psychiatry    Asthma. No PFT's on file. Maintained on albuterol inhaler PRN, reports using <1 time per week. Lungs CTAB on exam.  - PRN albuterol inhaler ordered    Episode of HOTN. Patient with episode of hypotension on admission 12/14 with BP down to 83/49. BP's appear to have stabilized this AM. Suspect transient hypotension in setting of poor PO intake/dehydration. BP's improved this AM, most recently 125/87.  - Encourage PO intake  - Please contact medicine if SBP <80    Dental Pain, ?Dental Infection. Had 3 teeth extracted on 12/13. Reports being prescribed an antibiotic by her dentist although unsure which one. Complains of left-sided jaw pain, which makes talking and eating difficult. On exam, some gingival  swelling and erythema noted along extraction sites. No apparent dental abscess, uvular deviation, or tonsillar swelling. Unclear what medication patient had been started on; will order clindamycin for possible dental infection.   - Start clindamycin 300 mg q6h x 7 days  - Orajel PRN    Abnormal TSH. TSH mildly decreased at 0.30, free T4 WNL. Suspect abnormal value in setting of polysubstance withdrawals/stress response. Would recommend following up with PCP as an OP to obtain repeat TFT's after acute withdrawal period complete.  - Follow up with PCP as OP to obtain repeat TFT's (order placed)    Medicine will sign off at this time. Please page the Internal Medicine job code pager for any intercurrent medical issues which arise. Thank you for the opportunity to be a part of this patient's care.    Smitha Nunez PA-C  Hospitalist Service  [phone]
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
## 2017-12-16 PROCEDURE — 25000132 ZZH RX MED GY IP 250 OP 250 PS 637: Performed by: NURSE PRACTITIONER

## 2017-12-16 PROCEDURE — 25000132 ZZH RX MED GY IP 250 OP 250 PS 637: Performed by: PHYSICIAN ASSISTANT

## 2017-12-16 PROCEDURE — 12800012 ZZH R&B CD MH INTERMEDIATE ADULT

## 2017-12-16 PROCEDURE — 25000132 ZZH RX MED GY IP 250 OP 250 PS 637: Performed by: PSYCHIATRY & NEUROLOGY

## 2017-12-16 RX ORDER — PHENOBARBITAL 32.4 MG/1
32.4 TABLET ORAL 4 TIMES DAILY
Status: DISPENSED | OUTPATIENT
Start: 2017-12-16 | End: 2017-12-19

## 2017-12-16 RX ADMIN — PHENOBARBITAL 64.8 MG: 64.8 TABLET ORAL at 16:14

## 2017-12-16 RX ADMIN — CLINDAMYCIN HYDROCHLORIDE 300 MG: 300 CAPSULE ORAL at 12:41

## 2017-12-16 RX ADMIN — NICOTINE 1 PATCH: 21 PATCH, EXTENDED RELEASE TRANSDERMAL at 07:55

## 2017-12-16 RX ADMIN — PHENOBARBITAL 64.8 MG: 64.8 TABLET ORAL at 12:41

## 2017-12-16 RX ADMIN — ESCITALOPRAM OXALATE 20 MG: 20 TABLET ORAL at 07:54

## 2017-12-16 RX ADMIN — BUPRENORPHINE HCL 2 MG: 2 TABLET SUBLINGUAL at 21:07

## 2017-12-16 RX ADMIN — MULTIPLE VITAMINS W/ MINERALS TAB 1 TABLET: TAB at 07:54

## 2017-12-16 RX ADMIN — GABAPENTIN 300 MG: 300 CAPSULE ORAL at 07:54

## 2017-12-16 RX ADMIN — GABAPENTIN 300 MG: 300 CAPSULE ORAL at 13:47

## 2017-12-16 RX ADMIN — PHENOBARBITAL 64.8 MG: 64.8 TABLET ORAL at 07:55

## 2017-12-16 RX ADMIN — BUPRENORPHINE HCL 2 MG: 2 TABLET SUBLINGUAL at 07:55

## 2017-12-16 RX ADMIN — Medication 100 MG: at 07:54

## 2017-12-16 RX ADMIN — BUPRENORPHINE HCL 2 MG: 2 TABLET SUBLINGUAL at 13:47

## 2017-12-16 RX ADMIN — TRAZODONE HYDROCHLORIDE 150 MG: 150 TABLET ORAL at 23:08

## 2017-12-16 RX ADMIN — VENLAFAXINE HYDROCHLORIDE 150 MG: 150 TABLET, EXTENDED RELEASE ORAL at 07:55

## 2017-12-16 RX ADMIN — CLINDAMYCIN HYDROCHLORIDE 300 MG: 300 CAPSULE ORAL at 05:48

## 2017-12-16 RX ADMIN — FOLIC ACID 1 MG: 1 TABLET ORAL at 07:55

## 2017-12-16 RX ADMIN — CLINDAMYCIN HYDROCHLORIDE 300 MG: 300 CAPSULE ORAL at 17:44

## 2017-12-16 RX ADMIN — CLINDAMYCIN HYDROCHLORIDE 300 MG: 300 CAPSULE ORAL at 00:59

## 2017-12-16 ASSESSMENT — ACTIVITIES OF DAILY LIVING (ADL)
ORAL_HYGIENE: INDEPENDENT
GROOMING: INDEPENDENT
LAUNDRY: WITH SUPERVISION
DRESS: STREET CLOTHES

## 2017-12-17 PROCEDURE — 25000132 ZZH RX MED GY IP 250 OP 250 PS 637: Performed by: PSYCHIATRY & NEUROLOGY

## 2017-12-17 PROCEDURE — 25000132 ZZH RX MED GY IP 250 OP 250 PS 637: Performed by: PHYSICIAN ASSISTANT

## 2017-12-17 PROCEDURE — 25000132 ZZH RX MED GY IP 250 OP 250 PS 637: Performed by: NURSE PRACTITIONER

## 2017-12-17 PROCEDURE — 12800012 ZZH R&B CD MH INTERMEDIATE ADULT

## 2017-12-17 RX ADMIN — CLINDAMYCIN HYDROCHLORIDE 300 MG: 300 CAPSULE ORAL at 06:17

## 2017-12-17 RX ADMIN — PHENOBARBITAL 32.4 MG: 32.4 TABLET ORAL at 21:40

## 2017-12-17 RX ADMIN — BUPRENORPHINE HCL 2 MG: 2 TABLET SUBLINGUAL at 13:22

## 2017-12-17 RX ADMIN — Medication 100 MG: at 07:52

## 2017-12-17 RX ADMIN — ESCITALOPRAM OXALATE 20 MG: 20 TABLET ORAL at 07:52

## 2017-12-17 RX ADMIN — BUPRENORPHINE HCL 2 MG: 2 TABLET SUBLINGUAL at 07:53

## 2017-12-17 RX ADMIN — CLINDAMYCIN HYDROCHLORIDE 300 MG: 300 CAPSULE ORAL at 00:04

## 2017-12-17 RX ADMIN — PHENOBARBITAL 32.4 MG: 32.4 TABLET ORAL at 16:25

## 2017-12-17 RX ADMIN — MULTIPLE VITAMINS W/ MINERALS TAB 1 TABLET: TAB at 07:52

## 2017-12-17 RX ADMIN — BUPRENORPHINE HCL 2 MG: 2 TABLET SUBLINGUAL at 20:56

## 2017-12-17 RX ADMIN — PHENOBARBITAL 32.4 MG: 32.4 TABLET ORAL at 07:54

## 2017-12-17 RX ADMIN — CLINDAMYCIN HYDROCHLORIDE 300 MG: 300 CAPSULE ORAL at 18:00

## 2017-12-17 RX ADMIN — CLINDAMYCIN HYDROCHLORIDE 300 MG: 300 CAPSULE ORAL at 13:22

## 2017-12-17 RX ADMIN — PHENOBARBITAL 32.4 MG: 32.4 TABLET ORAL at 13:23

## 2017-12-17 RX ADMIN — FOLIC ACID 1 MG: 1 TABLET ORAL at 07:54

## 2017-12-17 RX ADMIN — TRAZODONE HYDROCHLORIDE 150 MG: 150 TABLET ORAL at 21:41

## 2017-12-17 RX ADMIN — NICOTINE 1 PATCH: 21 PATCH, EXTENDED RELEASE TRANSDERMAL at 07:53

## 2017-12-17 RX ADMIN — CLINDAMYCIN HYDROCHLORIDE 300 MG: 300 CAPSULE ORAL at 23:57

## 2017-12-17 RX ADMIN — VENLAFAXINE HYDROCHLORIDE 150 MG: 150 TABLET, EXTENDED RELEASE ORAL at 07:52

## 2017-12-17 ASSESSMENT — ACTIVITIES OF DAILY LIVING (ADL)
ORAL_HYGIENE: INDEPENDENT
LAUNDRY: WITH SUPERVISION
DRESS: STREET CLOTHES
GROOMING: INDEPENDENT

## 2017-12-17 NOTE — PROGRESS NOTES
Pt asked for her security belongings to be on the unit today when her daughter arrived. She took one of the check leaves (she initially brought in 2 leaves) to be removed from her security belongings. Pt reports she is sending one of the leaves home with her daughter, per pt is for her daughter to pay her bills.

## 2017-12-17 NOTE — PROGRESS NOTES
Pt states she spoke to the [facility] and is aware she can't be on any medication tapers of phenobarbital or suboxone. Pt states feeling like she needs to cut back on the phenobarbital dosing stating thinking she is too drowsy. She already has had her dose changed from 64.8 qid to 32.4 qid. Past 3 days of phenobarbital dosing illustrated below.    PHENobarbital (LUMINAL) tablet 32.4 mg  Dose: 32.4 mg Freq: 4 TIMES DAILY Route: PO  Start: 12/16/17 2000      (2225) [C]          0754 (32.4 mg)     1200       1600     2000          PHENobarbital (LUMINAL) tablet 32.4 mg  Dose: 32.4 mg Freq: AT BEDTIME Route: PO  Start: 12/14/17 2200 End: 12/16/17 1043    (2332) [C]          2056 (32.4 mg)     (2224) [C]            PHENobarbital (LUMINAL) tablet 64.8 mg  Dose: 64.8 mg Freq: 4 TIMES DAILY Route: PO  Start: 12/15/17 0800 End: 12/16/17 1900     0829 (64.8 mg)     1128 (64.8 mg)       1617 (64.8 mg)     (2224) [C]        0755 (64.8 mg)     1241 (64.8 mg)       1614 (64.8 mg)            Pt reports she may skip a dose of the 32.4 mg today, likely the 2000 dose. Since pt has described being drowsy her 0800 dose of gabapentin was held. Recommend close monitoring of her somnolent status and holding any sedating medications if no improvement (along with updating the physician). She denies feeling light headed or dizzy and is stable on her feet.  BP today is 90/59, she has been primarily hypotensive since admission.        12/14/17 1733 12/14/17 2047 12/15/17 0743   Vital Signs   /65 (!) 83/49 100/70       12/15/17 1122 12/15/17 1353 12/15/17 1601   Vital Signs   /87 104/71 97/74       12/15/17 2026 12/16/17 0551 12/16/17 0740   Vital Signs   /78 (!) 80/50 111/70       12/16/17 1134 12/16/17 1605 12/16/17 2023   Vital Signs   BP 96/58 96/63 (!) 85/60       12/17/17 0732   Vital Signs   BP 90/59

## 2017-12-18 PROCEDURE — 99207 ZZC CDG-MDM COMPONENT: MEETS LOW - DOWN CODED: CPT | Performed by: PSYCHIATRY & NEUROLOGY

## 2017-12-18 PROCEDURE — 99232 SBSQ HOSP IP/OBS MODERATE 35: CPT | Performed by: PSYCHIATRY & NEUROLOGY

## 2017-12-18 PROCEDURE — 25000132 ZZH RX MED GY IP 250 OP 250 PS 637: Performed by: PSYCHIATRY & NEUROLOGY

## 2017-12-18 PROCEDURE — 25000132 ZZH RX MED GY IP 250 OP 250 PS 637: Performed by: NURSE PRACTITIONER

## 2017-12-18 PROCEDURE — 25000132 ZZH RX MED GY IP 250 OP 250 PS 637: Performed by: PHYSICIAN ASSISTANT

## 2017-12-18 PROCEDURE — 12800012 ZZH R&B CD MH INTERMEDIATE ADULT

## 2017-12-18 RX ORDER — VENLAFAXINE 75 MG/1
75 TABLET ORAL ONCE
Status: DISCONTINUED | OUTPATIENT
Start: 2017-12-18 | End: 2017-12-18 | Stop reason: DRUGHIGH

## 2017-12-18 RX ORDER — VENLAFAXINE HYDROCHLORIDE 37.5 MG/1
37.5 TABLET, EXTENDED RELEASE ORAL
Status: DISCONTINUED | OUTPATIENT
Start: 2017-12-20 | End: 2017-12-20

## 2017-12-18 RX ORDER — VENLAFAXINE HYDROCHLORIDE 75 MG/1
75 TABLET, EXTENDED RELEASE ORAL
Status: DISCONTINUED | OUTPATIENT
Start: 2017-12-19 | End: 2017-12-21 | Stop reason: HOSPADM

## 2017-12-18 RX ORDER — BUPRENORPHINE 2 MG/1
2 TABLET SUBLINGUAL 2 TIMES DAILY
Status: DISPENSED | OUTPATIENT
Start: 2017-12-18 | End: 2017-12-19

## 2017-12-18 RX ORDER — BUPRENORPHINE 2 MG/1
2 TABLET SUBLINGUAL ONCE
Status: COMPLETED | OUTPATIENT
Start: 2017-12-18 | End: 2017-12-18

## 2017-12-18 RX ORDER — VENLAFAXINE 37.5 MG/1
37.5 TABLET ORAL ONCE
Status: COMPLETED | OUTPATIENT
Start: 2017-12-18 | End: 2017-12-18

## 2017-12-18 RX ORDER — VENLAFAXINE 37.5 MG/1
75 TABLET ORAL ONCE
Status: COMPLETED | OUTPATIENT
Start: 2017-12-18 | End: 2017-12-18

## 2017-12-18 RX ADMIN — CLINDAMYCIN HYDROCHLORIDE 300 MG: 300 CAPSULE ORAL at 06:23

## 2017-12-18 RX ADMIN — PHENOBARBITAL 32.4 MG: 32.4 TABLET ORAL at 08:18

## 2017-12-18 RX ADMIN — CLINDAMYCIN HYDROCHLORIDE 300 MG: 300 CAPSULE ORAL at 18:54

## 2017-12-18 RX ADMIN — TRAZODONE HYDROCHLORIDE 150 MG: 150 TABLET ORAL at 22:05

## 2017-12-18 RX ADMIN — ESCITALOPRAM OXALATE 20 MG: 20 TABLET ORAL at 08:18

## 2017-12-18 RX ADMIN — FOLIC ACID 1 MG: 1 TABLET ORAL at 08:18

## 2017-12-18 RX ADMIN — PHENOBARBITAL 32.4 MG: 32.4 TABLET ORAL at 16:18

## 2017-12-18 RX ADMIN — MULTIPLE VITAMINS W/ MINERALS TAB 1 TABLET: TAB at 08:18

## 2017-12-18 RX ADMIN — BUPRENORPHINE HCL 2 MG: 2 TABLET SUBLINGUAL at 09:25

## 2017-12-18 RX ADMIN — GABAPENTIN 300 MG: 300 CAPSULE ORAL at 16:18

## 2017-12-18 RX ADMIN — PHENOBARBITAL 32.4 MG: 32.4 TABLET ORAL at 22:05

## 2017-12-18 RX ADMIN — CLINDAMYCIN HYDROCHLORIDE 300 MG: 300 CAPSULE ORAL at 14:05

## 2017-12-18 RX ADMIN — BUPRENORPHINE HCL 2 MG: 2 TABLET SUBLINGUAL at 16:45

## 2017-12-18 RX ADMIN — VENLAFAXINE 37.5 MG: 37.5 TABLET ORAL at 10:33

## 2017-12-18 RX ADMIN — VENLAFAXINE HYDROCHLORIDE 75 MG: 75 TABLET ORAL at 10:33

## 2017-12-18 RX ADMIN — NICOTINE 1 PATCH: 21 PATCH, EXTENDED RELEASE TRANSDERMAL at 08:18

## 2017-12-18 RX ADMIN — PHENOBARBITAL 32.4 MG: 32.4 TABLET ORAL at 14:05

## 2017-12-18 ASSESSMENT — ACTIVITIES OF DAILY LIVING (ADL)
LAUNDRY: WITH SUPERVISION
ORAL_HYGIENE: INDEPENDENT
GROOMING: INDEPENDENT
DRESS: STREET CLOTHES

## 2017-12-18 NOTE — PROGRESS NOTES
"Internal medicine and Dr. Ingram updated about pt ongoing hypotension. BP (!) 85/55  Pulse 56  Temp 97  F (36.1  C) (Oral)  Resp 16  Ht 1.626 m (5' 4\")  Wt 59 kg (130 lb)  BMI 22.31 kg/m2    "

## 2017-12-18 NOTE — PROGRESS NOTES
[facility], [place]   Psychiatric Progress Note    Interim history   This is a 47 year old female with 1.  Alcohol use disorder, severe.   2.  Sedative hypnotic use disorder.   3.  Major depressive disorder.   4.  Opiate use disorder. .Pt seen in rounds. Patient's mood is good  Energy Level:MODERATE  Sleep:No concerns, sleeps well through night  Appetite:normal motivation interest improving    deneid any Suicidal/homicidal ideation/plan intent.  deneid any psychosis  No prior suicde attempts  No access to gun  Pt is in detox  Pt mssa/cows score are monitered  Tolerating meds and has no side effects.              Medications:     Current Facility-Administered Medications   Medication     buprenorphine (SUBUTEX) sublingual tablet 2 mg     PHENobarbital (LUMINAL) tablet 32.4 mg     ibuprofen (ADVIL/MOTRIN) tablet 600 mg     escitalopram (LEXAPRO) tablet 20 mg     venlafaxine (EFFEXOR-ER) 24 hr tablet 150 mg     naloxone (NARCAN) injection 0.1-0.4 mg     diazepam (VALIUM) tablet 5-20 mg     folic acid (FOLVITE) tablet 1 mg     multivitamin, therapeutic with minerals (THERA-VIT-M) tablet 1 tablet     clindamycin (CLEOCIN) capsule 300 mg     albuterol (PROAIR HFA/PROVENTIL HFA/VENTOLIN HFA) Inhaler 2 puff     benzocaine (ORAJEL MAXIMUM STRENGTH) 20 % gel     hydrOXYzine (ATARAX) tablet 25-50 mg     acetaminophen (TYLENOL) tablet 650 mg     alum & mag hydroxide-simethicone (MYLANTA ES/MAALOX  ES) suspension 30 mL     magnesium hydroxide (MILK OF MAGNESIA) suspension 30 mL     bisacodyl (DULCOLAX) Suppository 10 mg     nicotine Patch in Place     nicotine patch REMOVAL     nicotine (NICODERM CQ) 21 MG/24HR 24 hr patch 1 patch     ondansetron (ZOFRAN-ODT) ODT tab 4 mg     gabapentin (NEURONTIN) capsule 300 mg     traZODone (DESYREL) tablet 150 mg             Allergies:   No Known Allergies         Psychiatric Examination:   Blood pressure (!) 85/55, pulse 56, temperature 97  F (36.1  C),  "temperature source Oral, resp. rate 16, height 1.626 m (5' 4\"), weight 59 kg (130 lb).  Weight is 130 lbs 0 oz  Body mass index is 22.31 kg/(m^2).    Appearance:  awake, alert and adequately groomed  Attitude:  cooperative  Eye Contact:  good  Mood:  better  Affect:  appropriate and in normal range and mood congruent  Speech:  clear, coherent rate /rhythm are good  Psychomotor Behavior:  no evidence of tardive dyskinesia, dystonia, or tics and intact station, gait and muscle tone  Throught Process:  logical  Associations:  no loose associations  Thought Content:  no evidence of suicidal ideation or homicidal ideation, no evidence of psychotic thought, no auditory hallucinations present and no visual hallucinations present  Insight:  good  Judgement:  intact  Oriented to:  time, person, and place  Attention Span and Concentration:  intact  Recent and Remote Memory:  intact  Language fund of knowledge are adequate         Labs:     No results found for: NTBNPI, NTBNP  Lab Results   Component Value Date    WBC 10.2 12/15/2017    HGB 13.3 12/15/2017    HCT 40.7 12/15/2017    MCV 90 12/15/2017     12/15/2017     Lab Results   Component Value Date    TSH 0.30 (L) 12/15/2017            Axis I:     1.  Alcohol use disorder, severe.   2.  Sedative hypnotic use disorder.   3.  Major depressive disorder.   4.  Opiate use disorder.       PLAN:  The patient will be detoxed off opiates using buprenorphine. Will start taper [id number] bid , pt want just detox , was informed suboxne maintenance is better   The patient will be detoxed off alcohol using MSSA protocol on Valium.  She will be detoxed off sedative hypnotics using phenobarbital.  over the weekend phenobarbital dose was reduced to 30 mg po qid , due to excessive sedation   Will  Continue be tapered off  Effexor and continued on Lexapro 20 mg.  This is a cross taper.  The patient wants to do treatment at [facility].     Laboratory/Imaging: reviewed with patient "   Consults: internal medicine consult reviewed  Patient will be treated in therapeutic milieu with appropriate individual and group therapies as described.      Medical diagnoses to be addressed this admission:    Episode of HOTN. Patient with episode of hypotension on admission 12/14 with BP down to 83/49. BP's appear to have stabilized this AM. Suspect transient hypotension in setting of poor PO intake/dehydration. BP's improved this AM, most recently 125/87.  - Encourage PO intake  - Please contact medicine if SBP <80     Dental Pain, ?Dental Infection. Had 3 teeth extracted on 12/13. Reports being prescribed an antibiotic by her dentist although unsure which one. Complains of left-sided jaw pain, which makes talking and eating difficult. On exam, some gingival swelling and erythema noted along extraction sites. No apparent dental abscess, uvular deviation, or tonsillar swelling. Unclear what medication patient had been started on; will order clindamycin for possible dental infection.   - Start clindamycin 300 mg q6h x 7 days  - Orajel PRN     Abnormal TSH. TSH mildly decreased at 0.30, free T4 WNL. Suspect abnormal value in setting of polysubstance withdrawals/stress response. Would recommend following up with PCP as an OP to obtain repeat TFT's after acute withdrawal period complete.  - Follow up with PCP as OP to obtain repeat TFT's (order placed)        Legal Status: voluntary    Safety Assessment:   Checks:  15 min  Precautions: withdrawal precautions  Pt has not required locked seclusion or restraints in the past 24 hours to maintain safety, please refer to RN documentation for further details.  Discussed with patient many issues of addiction,triggers, relapse, and establishing a solid recovery program.  Able to give informed consent:  YES   Discussed Risks/Benefits/Side Effects/Alternatives: YES    After discussion of the indications, risks, benefits, alternatives and consequences of no treatment, the  patient elects to complete detox and do trt.

## 2017-12-19 PROCEDURE — 99207 ZZC CDG-MDM COMPONENT: MEETS MODERATE - UP CODED: CPT | Performed by: PSYCHIATRY & NEUROLOGY

## 2017-12-19 PROCEDURE — 25000132 ZZH RX MED GY IP 250 OP 250 PS 637: Performed by: PSYCHIATRY & NEUROLOGY

## 2017-12-19 PROCEDURE — 25000132 ZZH RX MED GY IP 250 OP 250 PS 637: Performed by: NURSE PRACTITIONER

## 2017-12-19 PROCEDURE — 12800012 ZZH R&B CD MH INTERMEDIATE ADULT

## 2017-12-19 PROCEDURE — 25000132 ZZH RX MED GY IP 250 OP 250 PS 637: Performed by: PHYSICIAN ASSISTANT

## 2017-12-19 PROCEDURE — 99232 SBSQ HOSP IP/OBS MODERATE 35: CPT | Performed by: PSYCHIATRY & NEUROLOGY

## 2017-12-19 RX ORDER — BUPRENORPHINE AND NALOXONE 2; .5 MG/1; MG/1
1 FILM, SOLUBLE BUCCAL; SUBLINGUAL DAILY
Status: DISCONTINUED | OUTPATIENT
Start: 2017-12-20 | End: 2017-12-21 | Stop reason: HOSPADM

## 2017-12-19 RX ORDER — BUPRENORPHINE 2 MG/1
2 TABLET SUBLINGUAL ONCE
Status: DISCONTINUED | OUTPATIENT
Start: 2017-12-19 | End: 2017-12-20

## 2017-12-19 RX ORDER — PHENOBARBITAL 32.4 MG/1
32.4 TABLET ORAL 3 TIMES DAILY
Status: DISCONTINUED | OUTPATIENT
Start: 2017-12-20 | End: 2017-12-21

## 2017-12-19 RX ADMIN — NICOTINE 1 PATCH: 21 PATCH, EXTENDED RELEASE TRANSDERMAL at 07:59

## 2017-12-19 RX ADMIN — MULTIPLE VITAMINS W/ MINERALS TAB 1 TABLET: TAB at 07:59

## 2017-12-19 RX ADMIN — BUPRENORPHINE HCL 2 MG: 2 TABLET SUBLINGUAL at 07:59

## 2017-12-19 RX ADMIN — HYDROXYZINE HYDROCHLORIDE 50 MG: 25 TABLET ORAL at 20:35

## 2017-12-19 RX ADMIN — CLINDAMYCIN HYDROCHLORIDE 300 MG: 300 CAPSULE ORAL at 07:59

## 2017-12-19 RX ADMIN — PHENOBARBITAL 32.4 MG: 32.4 TABLET ORAL at 20:35

## 2017-12-19 RX ADMIN — CLINDAMYCIN HYDROCHLORIDE 300 MG: 300 CAPSULE ORAL at 18:37

## 2017-12-19 RX ADMIN — PHENOBARBITAL 32.4 MG: 32.4 TABLET ORAL at 07:59

## 2017-12-19 RX ADMIN — PHENOBARBITAL 32.4 MG: 32.4 TABLET ORAL at 16:12

## 2017-12-19 RX ADMIN — ESCITALOPRAM OXALATE 20 MG: 20 TABLET ORAL at 07:59

## 2017-12-19 RX ADMIN — TRAZODONE HYDROCHLORIDE 150 MG: 150 TABLET ORAL at 22:02

## 2017-12-19 RX ADMIN — VENLAFAXINE HYDROCHLORIDE 75 MG: 75 TABLET, EXTENDED RELEASE ORAL at 07:59

## 2017-12-19 RX ADMIN — BUPRENORPHINE HCL 2 MG: 2 TABLET SUBLINGUAL at 16:17

## 2017-12-19 RX ADMIN — PHENOBARBITAL 32.4 MG: 32.4 TABLET ORAL at 12:44

## 2017-12-19 RX ADMIN — FOLIC ACID 1 MG: 1 TABLET ORAL at 07:59

## 2017-12-19 RX ADMIN — CLINDAMYCIN HYDROCHLORIDE 300 MG: 300 CAPSULE ORAL at 12:43

## 2017-12-19 ASSESSMENT — ACTIVITIES OF DAILY LIVING (ADL)
ORAL_HYGIENE: INDEPENDENT
DRESS: STREET CLOTHES
GROOMING: INDEPENDENT
GROOMING: INDEPENDENT
ORAL_HYGIENE: INDEPENDENT
DRESS: STREET CLOTHES
LAUNDRY: WITH SUPERVISION
LAUNDRY: WITH SUPERVISION

## 2017-12-19 NOTE — PROGRESS NOTES
[facility], [place]   Psychiatric Progress Note    Interim history   This is a 47 year old female with 1.  Alcohol use disorder, severe.   2.  Sedative hypnotic use disorder.   3.  Major depressive disorder.   4.  Opiate use disorder. .Pt seen in rounds. Patient's mood is good  Energy Level:MODERATE  Sleep:No concerns, sleeps well through night  Appetite:normal motivation interest improving    deneid any Suicidal/homicidal ideation/plan intent.  deneid any psychosis  No prior suicde attempts  No access to gun  Pt is in detox  Pt mssa/cows score are monitered  Tolerating meds and has no side effects.              Medications:     Current Facility-Administered Medications   Medication     buprenorphine (SUBUTEX) sublingual tablet 2 mg     venlafaxine (EFFEXOR-ER) 24 hr tablet 75 mg     [START ON 12/20/2017] venlafaxine (EFFEXOR-ER) 24 hr tablet 37.5 mg     PHENobarbital (LUMINAL) tablet 32.4 mg     ibuprofen (ADVIL/MOTRIN) tablet 600 mg     escitalopram (LEXAPRO) tablet 20 mg     naloxone (NARCAN) injection 0.1-0.4 mg     diazepam (VALIUM) tablet 5-20 mg     folic acid (FOLVITE) tablet 1 mg     multivitamin, therapeutic with minerals (THERA-VIT-M) tablet 1 tablet     clindamycin (CLEOCIN) capsule 300 mg     albuterol (PROAIR HFA/PROVENTIL HFA/VENTOLIN HFA) Inhaler 2 puff     benzocaine (ORAJEL MAXIMUM STRENGTH) 20 % gel     hydrOXYzine (ATARAX) tablet 25-50 mg     acetaminophen (TYLENOL) tablet 650 mg     alum & mag hydroxide-simethicone (MYLANTA ES/MAALOX  ES) suspension 30 mL     magnesium hydroxide (MILK OF MAGNESIA) suspension 30 mL     bisacodyl (DULCOLAX) Suppository 10 mg     nicotine Patch in Place     nicotine patch REMOVAL     nicotine (NICODERM CQ) 21 MG/24HR 24 hr patch 1 patch     ondansetron (ZOFRAN-ODT) ODT tab 4 mg     gabapentin (NEURONTIN) capsule 300 mg     traZODone (DESYREL) tablet 150 mg             Allergies:   No Known Allergies         Psychiatric Examination:  "  Blood pressure 103/65, pulse 67, temperature 97.6  F (36.4  C), temperature source Oral, resp. rate 16, height 1.626 m (5' 4\"), weight 59 kg (130 lb).  Weight is 130 lbs 0 oz  Body mass index is 22.31 kg/(m^2).    Appearance:  awake, alert and adequately groomed  Attitude:  cooperative  Eye Contact:  good  Mood:  better  Affect:  appropriate and in normal range and mood congruent  Speech:  clear, coherent rate /rhythm are good  Psychomotor Behavior:  no evidence of tardive dyskinesia, dystonia, or tics and intact station, gait and muscle tone  Throught Process:  logical  Associations:  no loose associations  Thought Content:  no evidence of suicidal ideation or homicidal ideation, no evidence of psychotic thought, no auditory hallucinations present and no visual hallucinations present  Insight:  good  Judgement:  intact  Oriented to:  time, person, and place  Attention Span and Concentration:  intact  Recent and Remote Memory:  intact  Language fund of knowledge are adequate         Labs:     No results found for: NTBNPI, NTBNP  Lab Results   Component Value Date    WBC 10.2 12/15/2017    HGB 13.3 12/15/2017    HCT 40.7 12/15/2017    MCV 90 12/15/2017     12/15/2017     Lab Results   Component Value Date    TSH 0.30 (L) 12/15/2017            Axis I:     1.  Alcohol use disorder, severe.   2.  Sedative hypnotic use disorder.   3.  Major depressive disorder.   4.  Opiate use disorder.       PLAN:  The patient will be detoxed off opiates using buprenorphine. Will start taper [id number] bid , pt want just detox , was informed suboxne maintenance is better, will go down to subutex 2mg tommorow   The patient will be detoxed off alcohol using MSSA protocol on Valium.  She will be detoxed off sedative hypnotics using phenobarbital.  over the weekend phenobarbital dose was reduced to 30 mg po qid , due to excessive sedation   Will  Continue be tapered off  Effexor and continued on Lexapro 20 mg.  This is a cross " taper.  The patient wants to do treatment at [facility].     Laboratory/Imaging: reviewed with patient   Consults: internal medicine consult reviewed  Patient will be treated in therapeutic milieu with appropriate individual and group therapies as described.      Medical diagnoses to be addressed this admission:    Episode of HOTN. Patient with episode of hypotension on admission 12/14 with BP down to 83/49. BP's appear to have stabilized this AM. Suspect transient hypotension in setting of poor PO intake/dehydration. BP's improved this AM, most recently 125/87.  - Encourage PO intake  - Please contact medicine if SBP <80     Dental Pain, ?Dental Infection. Had 3 teeth extracted on 12/13. Reports being prescribed an antibiotic by her dentist although unsure which one. Complains of left-sided jaw pain, which makes talking and eating difficult. On exam, some gingival swelling and erythema noted along extraction sites. No apparent dental abscess, uvular deviation, or tonsillar swelling. Unclear what medication patient had been started on; will order clindamycin for possible dental infection.   - Start clindamycin 300 mg q6h x 7 days  - Orajel PRN     Abnormal TSH. TSH mildly decreased at 0.30, free T4 WNL. Suspect abnormal value in setting of polysubstance withdrawals/stress response. Would recommend following up with PCP as an OP to obtain repeat TFT's after acute withdrawal period complete.  - Follow up with PCP as OP to obtain repeat TFT's (order placed)        Legal Status: voluntary    Safety Assessment:   Checks:  15 min  Precautions: withdrawal precautions  Pt has not required locked seclusion or restraints in the past 24 hours to maintain safety, please refer to RN documentation for further details.  Discussed with patient many issues of addiction,triggers, relapse, and establishing a solid recovery program.  Able to give informed consent:  YES   Discussed Risks/Benefits/Side Effects/Alternatives: YES    After  discussion of the indications, risks, benefits, alternatives and consequences of no treatment, the patient elects to complete detox and do trt.

## 2017-12-20 PROCEDURE — 25000132 ZZH RX MED GY IP 250 OP 250 PS 637: Performed by: PSYCHIATRY & NEUROLOGY

## 2017-12-20 PROCEDURE — 12800012 ZZH R&B CD MH INTERMEDIATE ADULT

## 2017-12-20 PROCEDURE — 25000132 ZZH RX MED GY IP 250 OP 250 PS 637: Performed by: NURSE PRACTITIONER

## 2017-12-20 PROCEDURE — 25000132 ZZH RX MED GY IP 250 OP 250 PS 637: Performed by: PHYSICIAN ASSISTANT

## 2017-12-20 RX ADMIN — TRAZODONE HYDROCHLORIDE 150 MG: 150 TABLET ORAL at 21:02

## 2017-12-20 RX ADMIN — NICOTINE 1 PATCH: 21 PATCH, EXTENDED RELEASE TRANSDERMAL at 08:31

## 2017-12-20 RX ADMIN — PHENOBARBITAL 32.4 MG: 32.4 TABLET ORAL at 21:02

## 2017-12-20 RX ADMIN — FOLIC ACID 1 MG: 1 TABLET ORAL at 08:31

## 2017-12-20 RX ADMIN — PHENOBARBITAL 32.4 MG: 32.4 TABLET ORAL at 14:26

## 2017-12-20 RX ADMIN — CLINDAMYCIN HYDROCHLORIDE 300 MG: 300 CAPSULE ORAL at 00:00

## 2017-12-20 RX ADMIN — MULTIPLE VITAMINS W/ MINERALS TAB 1 TABLET: TAB at 08:31

## 2017-12-20 RX ADMIN — BUPRENORPHINE HYDROCHLORIDE, NALOXONE HYDROCHLORIDE 1 FILM: 2; .5 FILM, SOLUBLE BUCCAL; SUBLINGUAL at 08:32

## 2017-12-20 RX ADMIN — CLINDAMYCIN HYDROCHLORIDE 300 MG: 300 CAPSULE ORAL at 17:56

## 2017-12-20 RX ADMIN — ESCITALOPRAM OXALATE 20 MG: 20 TABLET ORAL at 08:31

## 2017-12-20 RX ADMIN — CLINDAMYCIN HYDROCHLORIDE 300 MG: 300 CAPSULE ORAL at 12:31

## 2017-12-20 RX ADMIN — VENLAFAXINE HYDROCHLORIDE 75 MG: 75 TABLET, EXTENDED RELEASE ORAL at 08:52

## 2017-12-20 RX ADMIN — GABAPENTIN 300 MG: 300 CAPSULE ORAL at 21:02

## 2017-12-20 RX ADMIN — CLINDAMYCIN HYDROCHLORIDE 300 MG: 300 CAPSULE ORAL at 06:37

## 2017-12-20 RX ADMIN — PHENOBARBITAL 32.4 MG: 32.4 TABLET ORAL at 08:31

## 2017-12-20 ASSESSMENT — ACTIVITIES OF DAILY LIVING (ADL)
GROOMING: INDEPENDENT
DRESS: INDEPENDENT
ORAL_HYGIENE: INDEPENDENT

## 2017-12-21 ENCOUNTER — RECORDS - HEALTHEAST (OUTPATIENT)
Dept: ADMINISTRATIVE | Facility: OTHER | Age: 47
End: 2017-12-21

## 2017-12-21 ENCOUNTER — COMMUNICATION - HEALTHEAST (OUTPATIENT)
Dept: FAMILY MEDICINE | Facility: CLINIC | Age: 47
End: 2017-12-21

## 2017-12-21 VITALS
BODY MASS INDEX: 22.2 KG/M2 | DIASTOLIC BLOOD PRESSURE: 71 MMHG | RESPIRATION RATE: 16 BRPM | HEIGHT: 64 IN | WEIGHT: 130 LBS | SYSTOLIC BLOOD PRESSURE: 102 MMHG | TEMPERATURE: 97.6 F | HEART RATE: 58 BPM

## 2017-12-21 PROCEDURE — 25000132 ZZH RX MED GY IP 250 OP 250 PS 637: Performed by: PSYCHIATRY & NEUROLOGY

## 2017-12-21 PROCEDURE — H2035 A/D TX PROGRAM, PER HOUR: HCPCS | Mod: HQ

## 2017-12-21 PROCEDURE — 25000132 ZZH RX MED GY IP 250 OP 250 PS 637: Performed by: PHYSICIAN ASSISTANT

## 2017-12-21 PROCEDURE — 99239 HOSP IP/OBS DSCHRG MGMT >30: CPT | Performed by: PSYCHIATRY & NEUROLOGY

## 2017-12-21 PROCEDURE — 25000132 ZZH RX MED GY IP 250 OP 250 PS 637: Performed by: NURSE PRACTITIONER

## 2017-12-21 RX ORDER — GABAPENTIN 300 MG/1
300 CAPSULE ORAL 3 TIMES DAILY
Qty: 90 CAPSULE | Refills: 0 | Status: SHIPPED | OUTPATIENT
Start: 2017-12-21

## 2017-12-21 RX ORDER — MULTIPLE VITAMINS W/ MINERALS TAB 9MG-400MCG
1 TAB ORAL DAILY
Qty: 30 EACH | Refills: 0 | Status: SHIPPED | OUTPATIENT
Start: 2017-12-21 | End: 2018-01-31

## 2017-12-21 RX ORDER — ALBUTEROL SULFATE 90 UG/1
2 AEROSOL, METERED RESPIRATORY (INHALATION) 4 TIMES DAILY PRN
Qty: 1 INHALER | Refills: 0 | Status: SHIPPED | OUTPATIENT
Start: 2017-12-21 | End: 2017-12-26

## 2017-12-21 RX ORDER — HYDROXYZINE HYDROCHLORIDE 25 MG/1
25-50 TABLET, FILM COATED ORAL EVERY 4 HOURS PRN
Qty: 120 TABLET | Refills: 0 | Status: SHIPPED | OUTPATIENT
Start: 2017-12-21

## 2017-12-21 RX ORDER — VENLAFAXINE HYDROCHLORIDE 37.5 MG/1
37.5 CAPSULE, EXTENDED RELEASE ORAL DAILY
Qty: 30 CAPSULE | Refills: 0 | Status: SHIPPED | OUTPATIENT
Start: 2017-12-21 | End: 2017-12-26

## 2017-12-21 RX ORDER — CLINDAMYCIN HCL 300 MG
300 CAPSULE ORAL EVERY 6 HOURS
Qty: 4 CAPSULE | Refills: 0 | Status: SHIPPED | OUTPATIENT
Start: 2017-12-21 | End: 2017-12-22

## 2017-12-21 RX ORDER — TRAZODONE HYDROCHLORIDE 50 MG/1
50-150 TABLET, FILM COATED ORAL AT BEDTIME
Qty: 90 TABLET | Refills: 0 | Status: SHIPPED | OUTPATIENT
Start: 2017-12-21 | End: 2018-01-31

## 2017-12-21 RX ORDER — ESCITALOPRAM OXALATE 20 MG/1
20 TABLET ORAL DAILY
Qty: 30 TABLET | Refills: 0 | Status: SHIPPED | OUTPATIENT
Start: 2017-12-21 | End: 2018-03-13

## 2017-12-21 RX ORDER — NICOTINE 21 MG/24HR
1 PATCH, TRANSDERMAL 24 HOURS TRANSDERMAL DAILY
Qty: 30 PATCH | Refills: 0 | Status: SHIPPED | OUTPATIENT
Start: 2017-12-21 | End: 2018-01-05

## 2017-12-21 RX ORDER — PHENOBARBITAL 32.4 MG/1
32.4 TABLET ORAL ONCE
Qty: 1 TABLET | Refills: 0 | Status: SHIPPED | OUTPATIENT
Start: 2017-12-22 | End: 2017-12-22

## 2017-12-21 RX ORDER — PHENOBARBITAL 64.8 MG/1
64.8 TABLET ORAL ONCE
Status: COMPLETED | OUTPATIENT
Start: 2017-12-21 | End: 2017-12-21

## 2017-12-21 RX ADMIN — PHENOBARBITAL 64.8 MG: 64.8 TABLET ORAL at 08:57

## 2017-12-21 RX ADMIN — CLINDAMYCIN HYDROCHLORIDE 300 MG: 300 CAPSULE ORAL at 00:15

## 2017-12-21 RX ADMIN — FOLIC ACID 1 MG: 1 TABLET ORAL at 08:57

## 2017-12-21 RX ADMIN — CLINDAMYCIN HYDROCHLORIDE 300 MG: 300 CAPSULE ORAL at 12:06

## 2017-12-21 RX ADMIN — NICOTINE 1 PATCH: 21 PATCH, EXTENDED RELEASE TRANSDERMAL at 08:56

## 2017-12-21 RX ADMIN — GABAPENTIN 300 MG: 300 CAPSULE ORAL at 14:18

## 2017-12-21 RX ADMIN — MULTIPLE VITAMINS W/ MINERALS TAB 1 TABLET: TAB at 08:57

## 2017-12-21 RX ADMIN — CLINDAMYCIN HYDROCHLORIDE 300 MG: 300 CAPSULE ORAL at 05:45

## 2017-12-21 RX ADMIN — GABAPENTIN 300 MG: 300 CAPSULE ORAL at 08:57

## 2017-12-21 RX ADMIN — VENLAFAXINE HYDROCHLORIDE 75 MG: 75 TABLET, EXTENDED RELEASE ORAL at 08:57

## 2017-12-21 RX ADMIN — HYDROXYZINE HYDROCHLORIDE 50 MG: 25 TABLET ORAL at 14:19

## 2017-12-21 RX ADMIN — BUPRENORPHINE HYDROCHLORIDE, NALOXONE HYDROCHLORIDE 1 FILM: 2; .5 FILM, SOLUBLE BUCCAL; SUBLINGUAL at 08:57

## 2017-12-21 RX ADMIN — ESCITALOPRAM OXALATE 20 MG: 20 TABLET ORAL at 08:57

## 2017-12-21 ASSESSMENT — ACTIVITIES OF DAILY LIVING (ADL)
LAUNDRY: WITH SUPERVISION
ORAL_HYGIENE: INDEPENDENT
DRESS: INDEPENDENT
GROOMING: INDEPENDENT

## 2017-12-21 NOTE — PROGRESS NOTES
Patient has orders to discharge and is agreeable to plan to discharge. She was tearful when recounting experiences from prior to admission, however full range affect and bright, social and smiling with peers and during other interactions with this writer. She denied SI or self harm ideation. Reported feeling hopeful about treatment and sober living after treatment. Prn hydroxyzine 50 mg given at 1420 for anxiety related to discharge ride being late to pick her up. Reviewed all discharge instructions including medication, follow-up plan, labs and appointments with patient. Patient verbalized understanding. Patient discharged to home at this time with plan to admit to CD treatment at the [facility] tomorrow. All belongings checked and sent.

## 2017-12-21 NOTE — DISCHARGE INSTRUCTIONS
Behavioral Discharge Planning and Instructions  THANK YOU FOR CHOOSING THE [facility]  3A  [phone]    Summary: You were admitted to Station 3A on 12/14/17 for detoxification from alcohol and benzodiazepine.  A medical exam was performed that included lab work. You have met with a  and opted to go to [facility].  Please take care and make your recovery a priority Mae! It was a pleasure working with you and the treatment team wishes you the very best in your recovery!  ~Jose Martin    Recommendation:  Residential Treatment, psychotherapy, sober support group engagement and active work with a sponsor or  through [facility].    Main Diagnosis: Per Dr. Samson;  1.  Alcohol use disorder, severe.   2.  Sedative hypnotic use disorder.   3.  Major depressive disorder.   4.  Opiate use disorder.     Major Treatments, Procedures and Findings:  You were treated for Alcohol detoxification using Valium administered based on the [facility] protocol and for Benzodiazepine detoxification using Phenobarbital. You were also treated for opioid withdrawal using the medication buprenorphine (this is the active ingredient in the combination medication, Suboxone). As an outpatient you will be prescribed tapers of Suboxone and phenobarbital, please take this medication as prescribed until it is gone. You had a chemical dependency assessment. You had labs drawn and those results were reviewed with you. Please take a copy of your lab work with you to your next primary care physician appointment.    Symptoms to Report:  If you experience more anxiety, confusion, sleeplessness, deep sadness or thoughts of suicide, notify your treatment team or notify your primary care physician. IF ANY OF THE SYMPTOMS YOU ARE EXPERIENCING ARE A MEDICAL EMERGENCY CALL 911 IMMEDIATELY.     Lifestyle Adjustment: Adjust your lifestyle to get enough sleep, relaxation, exercise and good nutrition. Continue to  develop healthy coping skills to decrease stress and promote a sober living environment. Do not use mood altering substances including alcohol, illegal drugs or addictive medications other than what is currently prescribed.     Follow-up Appointment:   Dr. Natty Barone on January 23, 3:40 pm  [facility]    Resources:   AA/NA and Sponsors are excellent resources for support and you can find one at any support group meeting.   SMART Recovery - self management for addiction recovery:  www.smartrecovery.org  http://www.aastpaul.org/?topic=8  http://a[place].org/meetings/  http://www.nam[place].org/index.php/meeting-list-pdf  Pathways ~ A Health Crisis Resource & Support Center:  588.463.4833.  http://www.Mis Descuentosreduction.org  [facility] 325-147-5027  Support Group:  AA/TERENCE and Sponsor/support  Crisis Intervention: 200.468.1439 or 063-693-6664 (TTY: 732.501.6791).  Call anytime for help.  National [place] on Mental Illness (www.[place].simon.org): 500.808.1389 or 219-715-2176.  Alcoholics Anonymous (www.alcoholics-anonymous.org): Check your phone book for your local chapter.  Suicide Awareness Voices of Education (SAVE) (www.save.org): 069-432-XGXL (7283)  National Suicide Prevention Line (www.mentalhealthmn.org): 401-851-ZHUG (5640)  Mental Health Consumer/Survivor Network of [place] (www.mhcsn.net): 223.501.5476 or 156-338-7375  Mental Health Association of [place] (www.mentalhealth.org): 831.874.3296 or 714-400-3922   Substance Abuse and Mental Health Services (www.samhsa.gov)    [place] Recovery Connection ([facility])  [facility] connects people seeking recovery to resources that help foster and sustain long-term recovery.  Whether you are seeking resources for treatment, transportation, housing, job training, education, health care or other pathways to recovery, [facility] is a great place to start.  630.723.7650.  www.[facility].org    General Medication Instructions:   See your medication sheet(s) for  instructions.   Take all medicines as directed.  Make no changes unless your doctor suggests them.   Go to all your doctor visits.  Be sure to have all your required lab tests. This way, your medicines can be refilled on time.  Do not use any forms of alcohol.    Please Note:  If you have any questions at anytime after you are discharged please call the [facility] [place] detox unit 3AW unit at [phone].  [facility], Behavioral Intake [phone]  Please take this discharge folder with you to all your follow up appointments, it contains your lab results, diagnosis, medication list and discharge recommendations.      THANK YOU FOR CHOOSING THE [facility]

## 2017-12-21 NOTE — PROGRESS NOTES
Behavioral Health  Note    Behavioral Health  Spirituality Group Note    UNIT 3AW    Name: Mae Holder YOB: 1970   MRN: [id number] Age: 47 year old      Patient attended -led group, which included discussion of spirituality, coping with illness and building resilience.    Patient attended group for 1.0 hrs.    The patient actively participated in group discussion and patient demonstrated an appreciation of topic's application for their personal circumstances. Gauri asked for a Restorationism prayer (after group) before leaving for her recovery program.    Topic/Focus of today's spirituality group: Letting Go - Forgiveness, Acceptance, and Spiritual Practices for Change  Activities: check-in, topical discussion, goal-articulation      Anupama Mishra MDiv, [facility]  Staff   Pager [phone]

## 2017-12-21 NOTE — PLAN OF CARE
"Problem: Substance Withdrawal  Goal: Substance Withdrawal  Signs and symptoms of listed problems will be absent or manageable.   Outcome: Improving  Pt out in the milieu all shift.  Social with peers and staff.  MSSA scores 2, 1 and COWS scores 2, 0.  Pt stated, \"I think I am ready to go tomorrow.  I don't have any withdrawal sx.  Pt is pleasant and cooperative.  Med compliant.  Denied pain.      "

## 2017-12-22 NOTE — DISCHARGE SUMMARY
More than 35 minutes spent on this summary, doing the discharge instructions, discharge medications, discharge mental status examination.      DISCHARGE DIAGNOSES:   AXIS 1:   1.  Alcohol use disorder, severe.   2.  Sedative hypnotic use disorder.   3.  Major depressive disorder, opiate use disorder, severe.      IDENTIFYING INFORMATION: Mae Holder is a 47-year-old  female admitted for detox.  Please detailed admission note by Dr. Samson on 12/15/2017.      HOSPITAL COURSE:  During the hospitalization, the patient was detoxed off opiates using buprenorphine.  She wanted to be detoxed off buprenorphine because she wants to go to [facility].  She was told that Suboxone maintenance is the gold standard.  At this time she is going to [facility] if she does not accept it, she wants to come off of it and in the future if she wants to take it she will consider detoxed off alcohol using MSSA protocol on Valium.  She was detoxed off sedative hypnotics using phenobarbital.  She was cross tapered off Effexor and continued on Lexapro.  She had an uneventful detox.  Her energy, motivation, sleep and interest improved.  She was seen by Internal Medicine consult.  Please review the detailed note on 12/15/2017.  The patient had lab work done, which was normal comprehensive metabolic panel.  CBC, CMP normal.  During the hospitalization, the patient was started on clindamycin. T4 was normal.  TSH was low.  Her energy, motivation, sleep and interest improved.  She did not have any suicidal or homicidal ideation, plan or intent.      DISCHARGE DISPOSITION:  The patient going to home and will go to [facility] tomorrow.      DISCHARGE MENTAL STATUS EXAMINATION:  The patient is alert, oriented x3.  Recent and remote memory, language, fund of knowledge are all adequate.  No loose associations.  The patient does not have any suicidal or homicidal ideation, plan or intent.  The patient going to [facility] tomorrow. She was asked  to make a followup appointment with primary care doctor.  She will do that.         SHANNAN NG MD             D: 2017 08:57   T: 2017 08:50   MT: MD      Name:     KEON LIVINGSTON   MRN:      [id number]        Account:        [id number]   :      1970           Admit Date:     [id number]                                  Discharge Date: 2017      Document: [id number]

## 2017-12-26 ENCOUNTER — HOSPITAL ENCOUNTER (INPATIENT)
Facility: CLINIC | Age: 47
LOS: 10 days | Discharge: HOME | End: 2018-01-05
Attending: EMERGENCY MEDICINE | Admitting: PSYCHIATRY & NEUROLOGY
Payer: MEDICAID

## 2017-12-26 ENCOUNTER — RECORDS - HEALTHEAST (OUTPATIENT)
Dept: ADMINISTRATIVE | Facility: OTHER | Age: 47
End: 2017-12-26

## 2017-12-26 DIAGNOSIS — F32.A DEPRESSION: ICD-10-CM

## 2017-12-26 DIAGNOSIS — R45.851 PASSIVE SUICIDAL IDEATIONS: ICD-10-CM

## 2017-12-26 DIAGNOSIS — F32.A DEPRESSION WITH SUICIDAL IDEATION: Primary | ICD-10-CM

## 2017-12-26 DIAGNOSIS — R45.851 DEPRESSION WITH SUICIDAL IDEATION: Primary | ICD-10-CM

## 2017-12-26 LAB
AMPHETAMINES UR QL SCN: NEGATIVE
ANION GAP SERPL CALCULATED.3IONS-SCNC: 5 MMOL/L (ref 3–14)
BARBITURATES UR QL: POSITIVE
BASOPHILS # BLD AUTO: 0 10E9/L (ref 0–0.2)
BASOPHILS NFR BLD AUTO: 0.3 %
BENZODIAZ UR QL: NEGATIVE
BUN SERPL-MCNC: 8 MG/DL (ref 7–30)
CALCIUM SERPL-MCNC: 8.9 MG/DL (ref 8.5–10.1)
CANNABINOIDS UR QL SCN: NEGATIVE
CHLORIDE SERPL-SCNC: 107 MMOL/L (ref 94–109)
CO2 SERPL-SCNC: 29 MMOL/L (ref 20–32)
COCAINE UR QL: NEGATIVE
CREAT SERPL-MCNC: 0.74 MG/DL (ref 0.52–1.04)
DIFFERENTIAL METHOD BLD: NORMAL
EOSINOPHIL # BLD AUTO: 0.3 10E9/L (ref 0–0.7)
EOSINOPHIL NFR BLD AUTO: 3.7 %
ERYTHROCYTE [DISTWIDTH] IN BLOOD BY AUTOMATED COUNT: 14.9 % (ref 10–15)
GFR SERPL CREATININE-BSD FRML MDRD: 84 ML/MIN/1.7M2
GLUCOSE SERPL-MCNC: 86 MG/DL (ref 70–99)
HCT VFR BLD AUTO: 37 % (ref 35–47)
HGB BLD-MCNC: 12.5 G/DL (ref 11.7–15.7)
IMM GRANULOCYTES # BLD: 0 10E9/L (ref 0–0.4)
IMM GRANULOCYTES NFR BLD: 0.1 %
LYMPHOCYTES # BLD AUTO: 2.8 10E9/L (ref 0.8–5.3)
LYMPHOCYTES NFR BLD AUTO: 38 %
MCH RBC QN AUTO: 29.9 PG (ref 26.5–33)
MCHC RBC AUTO-ENTMCNC: 33.8 G/DL (ref 31.5–36.5)
MCV RBC AUTO: 89 FL (ref 78–100)
MONOCYTES # BLD AUTO: 0.5 10E9/L (ref 0–1.3)
MONOCYTES NFR BLD AUTO: 6.8 %
NEUTROPHILS # BLD AUTO: 3.8 10E9/L (ref 1.6–8.3)
NEUTROPHILS NFR BLD AUTO: 51.1 %
NRBC # BLD AUTO: 0 10*3/UL
NRBC BLD AUTO-RTO: 0 /100
OPIATES UR QL SCN: NEGATIVE
PCP UR QL SCN: NEGATIVE
PLATELET # BLD AUTO: 291 10E9/L (ref 150–450)
POTASSIUM SERPL-SCNC: 3.7 MMOL/L (ref 3.4–5.3)
RBC # BLD AUTO: 4.18 10E12/L (ref 3.8–5.2)
SODIUM SERPL-SCNC: 141 MMOL/L (ref 133–144)
TSH SERPL DL<=0.005 MIU/L-ACNC: 0.69 MU/L (ref 0.4–4)
WBC # BLD AUTO: 7.4 10E9/L (ref 4–11)

## 2017-12-26 PROCEDURE — 85025 COMPLETE CBC W/AUTO DIFF WBC: CPT | Performed by: PSYCHIATRY & NEUROLOGY

## 2017-12-26 PROCEDURE — 36415 COLL VENOUS BLD VENIPUNCTURE: CPT | Performed by: PSYCHIATRY & NEUROLOGY

## 2017-12-26 PROCEDURE — 80048 BASIC METABOLIC PNL TOTAL CA: CPT | Performed by: PSYCHIATRY & NEUROLOGY

## 2017-12-26 PROCEDURE — 12400006 ZZH R&B MH INTERMEDIATE

## 2017-12-26 PROCEDURE — 25000132 ZZH RX MED GY IP 250 OP 250 PS 637: Performed by: PSYCHIATRY & NEUROLOGY

## 2017-12-26 PROCEDURE — 90791 PSYCH DIAGNOSTIC EVALUATION: CPT

## 2017-12-26 PROCEDURE — 80307 DRUG TEST PRSMV CHEM ANLYZR: CPT | Performed by: EMERGENCY MEDICINE

## 2017-12-26 PROCEDURE — 99285 EMERGENCY DEPT VISIT HI MDM: CPT | Mod: 25

## 2017-12-26 PROCEDURE — 84443 ASSAY THYROID STIM HORMONE: CPT | Performed by: PSYCHIATRY & NEUROLOGY

## 2017-12-26 RX ORDER — ACETAMINOPHEN 325 MG/1
650 TABLET ORAL EVERY 4 HOURS PRN
Status: DISCONTINUED | OUTPATIENT
Start: 2017-12-26 | End: 2018-01-05 | Stop reason: HOSPADM

## 2017-12-26 RX ORDER — HYDROXYZINE HYDROCHLORIDE 25 MG/1
25-50 TABLET, FILM COATED ORAL EVERY 4 HOURS PRN
Status: DISCONTINUED | OUTPATIENT
Start: 2017-12-26 | End: 2018-01-05 | Stop reason: HOSPADM

## 2017-12-26 RX ORDER — GABAPENTIN 300 MG/1
300 CAPSULE ORAL 3 TIMES DAILY
Status: DISCONTINUED | OUTPATIENT
Start: 2017-12-26 | End: 2018-01-05 | Stop reason: HOSPADM

## 2017-12-26 RX ORDER — MULTIPLE VITAMINS W/ MINERALS TAB 9MG-400MCG
1 TAB ORAL DAILY
Status: DISCONTINUED | OUTPATIENT
Start: 2017-12-27 | End: 2018-01-05 | Stop reason: HOSPADM

## 2017-12-26 RX ORDER — TRAZODONE HYDROCHLORIDE 50 MG/1
50-150 TABLET, FILM COATED ORAL AT BEDTIME
Status: DISCONTINUED | OUTPATIENT
Start: 2017-12-26 | End: 2018-01-05 | Stop reason: HOSPADM

## 2017-12-26 RX ORDER — ESCITALOPRAM OXALATE 20 MG/1
20 TABLET ORAL DAILY
Status: DISCONTINUED | OUTPATIENT
Start: 2017-12-27 | End: 2018-01-05 | Stop reason: HOSPADM

## 2017-12-26 RX ORDER — NICOTINE 21 MG/24HR
1 PATCH, TRANSDERMAL 24 HOURS TRANSDERMAL DAILY
Status: DISCONTINUED | OUTPATIENT
Start: 2017-12-26 | End: 2018-01-05 | Stop reason: HOSPADM

## 2017-12-26 RX ADMIN — NICOTINE 1 PATCH: 21 PATCH, EXTENDED RELEASE TRANSDERMAL at 20:27

## 2017-12-26 RX ADMIN — HYDROXYZINE HYDROCHLORIDE 50 MG: 25 TABLET ORAL at 20:27

## 2017-12-26 RX ADMIN — TRAZODONE HYDROCHLORIDE 100 MG: 50 TABLET ORAL at 20:27

## 2017-12-26 RX ADMIN — ACETAMINOPHEN 650 MG: 325 TABLET, FILM COATED ORAL at 20:27

## 2017-12-26 RX ADMIN — GABAPENTIN 300 MG: 300 CAPSULE ORAL at 20:27

## 2017-12-26 ASSESSMENT — ACTIVITIES OF DAILY LIVING (ADL)
DRESS: SCRUBS (BEHAVIORAL HEALTH)
GROOMING: INDEPENDENT
ORAL_HYGIENE: INDEPENDENT
LAUNDRY: WITH SUPERVISION

## 2017-12-26 ASSESSMENT — ENCOUNTER SYMPTOMS: NERVOUS/ANXIOUS: 1

## 2017-12-26 NOTE — ED PROVIDER NOTES
History     Chief Complaint:  Depression      HPI   Mae Holder is a 47 year old female with a history of depression, anxiety, asthma and alcohol and benzodiazepine abuse who presents to the ED for evaluation of depression. The patient recently finished detox on 12/19 at [facility] and went to a post-detox retreat voluntarily. While on retreat, her director told her she needed higher level of care due to her depression. Once she was discharged, she came here to the ED.    Here in the ED, she states being sober since December 5th. Recently, she has also been anxious due to a bad breakup, losing her job, and severed communication between her sisters due to her relapse. She denies suicidal/homicidal thoughts, seeing or hearing things, and intentional OD.  She is very sad and depressed and feels that she needs to be admitted for further mental health care.    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 The patient is nervous/anxious.         Positive for depressed.   All other systems reviewed and are negative.      Physical Exam   First Vitals:  BP: 142/70  Pulse: 82  Heart Rate: 82  Temp: 97.8  F (36.6  C)  Resp: 16  Height: 162.6 cm (5' 4\")  Weight: 59 kg (130 lb)  SpO2: 99 %      Physical Exam  General: Patient in moderate emotional distress.  Alert and cooperative with exam. Normal mentation.  Tearful on exam  HEENT: NC/AT. Conjunctiva without injection or scleral icterus. External ears normal.  Respiratory: Breathing comfortably on room air  CV: Normal rate, all extremities well perfused  GI:  Non-distended abdomen  Skin: Warm, dry, no rashes/open wounds on exposed skin  Musculoskeletal: No obvious deformities  Neuro: Alert, answers questions appropriately. No gross motor deficits  Psychiatric: Endorses depression, passive suicidal ideation, and anxiety.  Denies HI/SI with plan/hallucinations/overdose      Emergency Department Course     Laboratory:  Labs Ordered and Resulted from Time of ED Arrival Up to the Time of Departure from the ED   DRUG ABUSE SCREEN 77 URINE (FL, RH, SH) - Abnormal; Notable for the following:        Result Value    Barbiturates Qual Urine Positive (*)     All other components within normal limits       Impression & Plan      Emergency Department Course/  Medical Decision Making:  Patient is a 47-year-old female who presents from addiction rehab facility due to concern for increasing depression and passive suicidal ideation.  Patient's medical history and records were reviewed.  On evaluation patient denies current SI/HI/hallucinations.  Denies intentional overdose.  She reports no other medical complaints.  She was evaluated by DEC. urine drug screen positive for benzodiazepines (patient reported last taking on December 5).  At this time she is felt appropriate for inpatient stepdown " psychiatric unit to further address her significant depression and passive suicidal thoughts; voluntary admission.  Patient remained stable throughout ED course.  Admitted to Dr. Cedeno.      Diagnosis:    ICD-10-CM    1. Depression F32.9    2. Passive suicidal ideations R45.851        Disposition:  Admit to mental health unit    Kassie LARA, am serving as a scribe on 12/26/2017 at 4:35 PM to personally document services performed by Jesse Rosales DO based on my observations and the provider's statements to me.         Kassie Braun  12/26/2017    EMERGENCY DEPARTMENT       Jesse Rosales DO  12/26/17 1833

## 2017-12-26 NOTE — IP AVS SNAPSHOT
[place] [address] [place] [address]    Phone:  [phone]                                       After Visit Summary   12/26/2017    Mae Holder    MRN: [id number]           After Visit Summary Signature Page     I have received my discharge instructions, and my questions have been answered. I have discussed any challenges I see with this plan with the nurse or doctor.    ..........................................................................................................................................  Patient/Patient Representative Signature      ..........................................................................................................................................  Patient Representative Print Name and Relationship to Patient    ..................................................               ................................................  Date                                            Time    ..........................................................................................................................................  Reviewed by Signature/Title    ...................................................              ..............................................  Date                                                            Time

## 2017-12-26 NOTE — IP AVS SNAPSHOT
MRN:[id number]                      After Visit Summary   12/26/2017    Mae Holder    MRN: [id number]           Thank you!     Thank you for choosing [facility] for your care. Our goal is always to provide you with excellent care.        Patient Information     Date Of Birth          1970        About your hospital stay     You were admitted on:  December 26, 2017 You last received care in the:  [facility]    You were discharged on:  January 5, 2018       Who to Call     For medical emergencies, please call 911.  For non-urgent questions about your medical care, please call your primary care provider or clinic, [phone]          Attending Provider     Provider Specialty    Jesse Rosales DO Emergency Medicine    Lottie Cedeno MD Psychiatry       Primary Care Provider Office Phone # Fax #    Natty Barone [phone]      Your next 10 appointments already scheduled     Jan 05, 2018 12:00 PM CST   Treatment with LP/DOP ADMISSIONS   [facility] ([facility])    [address] [place] [id number] [phone]              Further instructions from your care team       Behavioral Discharge Planning and Instructions    Summary:  Admitted for worsening depression and suicidal ideation.     Main Diagnosis:   Major Depressive Disorder, recurrent, severe, without psychotic features; Alcohol Use Disorder; Sedative Hypnotic Use Disorder; Opioid Use Disorder.     Major Treatments, Procedures and Findings:  Psychiatric assessment. Medication adjustment.     Symptoms to Report: Losing more sleep, Mood getting worse or Thoughts of suicide    Lifestyle Adjustment:  Follow all treatment recommendations. Develop and follow safety plan. Utilize positive coping skills to manage symptoms of depression.  Abstain from the use of all mood-altering substances,  including alcohol, as usage may increase symptoms of depression. Maintain sobriety by attending daily AA or NA meetings and obtaining a sponsor.     Psychiatry Follow-up:     On 1/5/17, patient to enter:  [facility].  [facility]  [address] (next to [employer])  [place] [address]  [phone]    [facility]-says [facility] will accept you back once you complete [facility].  [address] [place] [address]  [phone]    Dr. Cedeno, psychiatry appointment on Wednesday 3/14/18 @ 2;40 PM.  Call when refills are needed.  [facility] [address], [address]  [place]  [address]  Phone:  [phone]  Fax:  [phone]    Resources:   Crisis Intervention: 753.266.4158 or 552-925-5884 (TTY: 695.431.8090).  Call anytime for help.  National [place] on Mental Illness (www.[place].simon.org): 334.407.6366 or 849-917-9381.  National Suicide Prevention Line (www.mentalhealthmn.org): 306-504-OUXU (9651)  COPE (Crisis Services for Adults) in [facility] [place]: 783.619.1963 (Available 24/7)  [facility] 24/7 crisis hotline for adults who are in the midst of a mental health or substance abuse crisis - 880.529.3682    General Medication Instructions:   See your medication sheet(s) for instructions.   Take all medicines as directed.  Make no changes unless your doctor suggests them.   Go to all your doctor visits.  Be sure to have all your required lab tests. This way, your medicines can be refilled on time.  Do not use any drugs not prescribed by your doctor.  Avoid alcohol.      Pending Results     No orders found from 12/24/2017 to 12/27/2017.            Statement of Approval     Ordered          01/05/18 0630  I have reviewed and agree with all the recommendations and orders detailed in this document.  EFFECTIVE NOW     Approved and electronically signed by:  Lottie Cedeno MD             Admission Information  "    Date & Time Provider Department Dept. Phone    2017 Lottie Cedeno MD [facility] [phone]      Your Vitals Were     Blood Pressure Pulse Temperature Respirations Height Weight    108/64 81 98.3  F (36.8  C) (Oral) 15 1.626 m (5' 4\") 59.9 kg (132 lb 1.6 oz)    Pulse Oximetry BMI (Body Mass Index)                100% 22.67 kg/m2          [employer] Information     [employer]ally lets you send messages to your doctor, view your test results, renew your prescriptions, schedule appointments and more. To sign up, go to [email or web address] . Click on \"Log in\" on the left side of the screen, which will take you to the Welcome page. Then click on \"Sign up Now\" on the right side of the page.     You will be asked to enter the access code listed below, as well as some personal information. Please follow the directions to create your username and password.     Your access code is: [email or web address]  Expires: 3/21/2018 10:09 AM     Your access code will  in 90 days. If you need help or a new code, please call your [facility] clinic or [phone].        Care EveryWhere ID     This is your Care EveryWhere ID. This could be used by other organizations to access your [facility] medical records  [id number]        Equal Access to Services     LOUISE MARQUEZ AH: Hadii gaurav mistryo Sojes, waaxda luqadaha, qaybta kaalmada aditya, ismael melgar. So [facility] [phone].    ATENCIÓN: Si habla español, tiene a polo disposición servicios gratuitos de asistencia lingüística. Llame al [phone].    We comply with applicable federal civil rights laws and [place] laws. We do not discriminate on the basis of race, color, national origin, age, disability, sex, sexual orientation, or gender identity.               Review of your medicines      START taking        Dose / Directions    DULoxetine 60 MG EC capsule   Commonly known as:  CYMBALTA        Dose:  60 mg   Take 1 " capsule (60 mg) by mouth daily   Quantity:  30 capsule   Refills:  0       QUEtiapine 25 MG tablet   Commonly known as:  SEROquel        Dose:  25-50 mg   Take 1-2 tablets (25-50 mg) by mouth every 8 hours as needed (anxiety)   Quantity:  90 tablet   Refills:  0         CONTINUE these medicines which have NOT CHANGED        Dose / Directions    escitalopram 20 MG tablet   Commonly known as:  LEXAPRO   Used for:  Sedative, hypnotic or anxiolytic dependence (H)        Dose:  20 mg   Take 1 tablet (20 mg) by mouth daily   Quantity:  30 tablet   Refills:  0       gabapentin 300 MG capsule   Commonly known as:  NEURONTIN   Used for:  Sedative, hypnotic or anxiolytic dependence (H)        Dose:  300 mg   Take 1 capsule (300 mg) by mouth 3 times daily   Quantity:  90 capsule   Refills:  0       hydrOXYzine 25 MG tablet   Commonly known as:  ATARAX   Used for:  Sedative, hypnotic or anxiolytic dependence (H)        Dose:  25-50 mg   Take 1-2 tablets (25-50 mg) by mouth every 4 hours as needed for anxiety   Quantity:  120 tablet   Refills:  0       multivitamin, therapeutic with minerals Tabs tablet   Used for:  Sedative, hypnotic or anxiolytic dependence (H)        Dose:  1 tablet   Take 1 tablet by mouth daily   Quantity:  30 each   Refills:  0       nicotine 21 MG/24HR 24 hr patch   Commonly known as:  NICODERM CQ   Used for:  Sedative, hypnotic or anxiolytic dependence (H)        Dose:  1 patch   Place 1 patch onto the skin daily   Quantity:  30 patch   Refills:  0       traZODone 50 MG tablet   Commonly known as:  DESYREL   Used for:  Sedative, hypnotic or anxiolytic dependence (H)        Dose:   mg   Take 1-3 tablets ( mg) by mouth At Bedtime   Quantity:  90 tablet   Refills:  0            Where to get your medicines      These medications were sent to [facility] [place], [place] - [address]  [address] [place] [address]     Phone:  [phone]     DULoxetine 60 MG EC  capsule    QUEtiapine 25 MG tablet                Protect others around you: Learn how to safely use, store and throw away your medicines at www.disposemymeds.org.             Medication List: This is a list of all your medications and when to take them. Check marks below indicate your daily home schedule. Keep this list as a reference.      Medications           Morning Afternoon Evening Bedtime As Needed    DULoxetine 60 MG EC capsule   Commonly known as:  CYMBALTA   Take 1 capsule (60 mg) by mouth daily   Last time this was given:  60 mg on 1/5/2018  7:34 AM                                escitalopram 20 MG tablet   Commonly known as:  LEXAPRO   Take 1 tablet (20 mg) by mouth daily   Last time this was given:  20 mg on 1/5/2018  7:34 AM                                gabapentin 300 MG capsule   Commonly known as:  NEURONTIN   Take 1 capsule (300 mg) by mouth 3 times daily   Last time this was given:  300 mg on 1/5/2018  7:34 AM                                hydrOXYzine 25 MG tablet   Commonly known as:  ATARAX   Take 1-2 tablets (25-50 mg) by mouth every 4 hours as needed for anxiety   Last time this was given:  50 mg on 12/30/2017  3:02 PM                                multivitamin, therapeutic with minerals Tabs tablet   Take 1 tablet by mouth daily   Last time this was given:  1 tablet on 1/5/2018  7:34 AM                                nicotine 21 MG/24HR 24 hr patch   Commonly known as:  NICODERM CQ   Place 1 patch onto the skin daily   Last time this was given:  1 patch on 1/5/2018  7:34 AM                                QUEtiapine 25 MG tablet   Commonly known as:  SEROquel   Take 1-2 tablets (25-50 mg) by mouth every 8 hours as needed (anxiety)   Last time this was given:  50 mg on 1/5/2018  9:05 AM                                traZODone 50 MG tablet   Commonly known as:  DESYREL   Take 1-3 tablets ( mg) by mouth At Bedtime   Last time this was given:  150 mg on 1/4/2018  9:33 PM                                           More Information        Depression: Tips to Help Yourself  As your healthcare providers help treat your depression, you can also help yourself. Keep in mind that your illness affects you emotionally, physically, mentally, and socially. So full recovery will take time. Take care of your body and your soul, and be patient with yourself as you get better.    Self-care    Educate yourself. Read about treatment and medicine options. If you have the energy, attend local conferences or support groups. Keep a list of useful websites and helpful books and use them as needed. This illness is not your fault. Don t blame yourself for your depression.    Manage early symptoms. If you notice symptoms returning, experience triggers, or identify other factors that may lead to a depressive episode, get help as soon as possible. Ask trusted friends and family to monitor your behavior and let you know if they see anything of concern.    Work with your provider. Find a provider you can trust. Communicate honestly with that person and share information on your treatment for depression and your reaction to medicines.    Be prepared for a crisis. Know what to do if you experience a crisis. Keep the phone number of a crisis hotline and know the location of your community's urgent care centers and the closest emergency department.    Hold off on big decisions. Depression can cloud your judgment. So wait until you feel better before making major life decisions, such as changing jobs, moving, or getting  or .    Be patient. Recovering from depression is a process. Don t be discouraged if it takes some time to feel better.    Keep it simple. Depression saps your energy and concentration. So you won t be able to do all the things you used to do. Set small goals and do what you can.    Be with others. Don t isolate yourself--you ll only feel worse. Try to be with other people. And take part in fun  activities when you can. Go to a movie, ballgame, [employer] service, or social event. Talk openly with people you can trust. And accept help when it s offered.  Take care of your body  People with depression often lose the desire to take care of themselves. That only makes their problems worse. During treatment and afterward, make a point to:    Exercise. It s a great way to take care of your body. And studies have shown that exercise helps fight depression.    Avoid drugs and alcohol. These may ease the pain in the short term. But they ll only make your problems worse in the long run.    Get relief from stress. Ask your healthcare provider for relaxation exercises and techniques to help relieve stress.    Eat right. A balanced and healthy diet helps keep your body healthy.  Date Last Reviewed: 1/1/2017 2000-2017 [employer]. [address] [place] [address]. All rights reserved. This information is not intended as a substitute for professional medical care. Always follow your healthcare professional's instructions.                Recovering from Addiction  Recovery means making a new life for yourself. This includes finding new interests. It involves building new relationships. It means taking better care of yourself. These will all help you replace substance use with a new and healthier life. They will also help you avoid the things that could make you want to use again.    Make lifestyle changes  A big part of recovery is changing habits. These are habits that may have led to your substance abuse. It s also a time for personal growth. Below are some changes you may want to make.    Find new activities and goals. You may want to try new hobbies and interests. Or, you may want to join an activity group to meet new people.    Build relationships. You may choose to spend more time with loved ones you lost touch with while you were using. You may also want to make new friends. And there may  be some friends or family members you will not want to see because they are still using.    Exercise and eat well. Get some physical activity on most days. This can help you feel better. Eat healthy meals with lots of fruits, vegetables, and whole grains. This can also help your well-being. A nutritionist or fitness expert can help you.    Maintain ties with medical and addiction professionals. While you may not need intense professional support, it is important to have reliable safety nets so you can access professional assistance when needed.    Relax and get enough sleep. Good sleep can help you feel better. So can less stress. Ask your counselor about relaxation exercises. These may include meditation. Also ask about stress management classes.  Date Last Reviewed: 2/1/2017 2000-2017 [employer]. [address] [place] [address], [place] [address]. All rights reserved. This information is not intended as a substitute for professional medical care. Always follow your healthcare professional's instructions.                Understanding the Disease of Addiction  What is addiction?  Addiction is a long-lasting (chronic) disease of the brain. It affects how your brain learns and works.Your genes and your environment can affect your risk for addiction. A family history of addiction also raises your risk. But anyone can have an addiction.Unfortunately, many people falsely think that addiction is a moral weakness. They think that people addicted to drugs or alcohol are just behaving badly or making poor choices.  How does addiction affect my brain?  Whether you start using drugs or alcohol is your choice. But once your brain is exposed to the addictive substance, your brain begins to change. This is especially true if you are more at risk for addiction. These brain changes overpower your self-control. This happens because the substance overexcites the brain s reward center. The substance mimics the brain's own  natural feel-good chemicals. The brain is rewired into believing that the substance is a good thing and that you need it to survive. This rewiring is very strong. Over time, you no longer find pleasure in other things you once enjoyed. The addiction is more powerful.  If you keep using the substance, your brain makes less of its own feel-good chemicals. You then must keep using drugs or alcohol to try to make up for the low levels of the brain chemicals. Over time your brain needs more and more of the drug or alcohol to achieve this. You need the drug. You no longer think about the physical, emotional, and social harm it causes.  Can you become addicted to things other than drugs or alcohol?  Addiction can happen in response to other pleasurable things that stimulate the brain s reward center. These things include eating, having sex, gambling, using tobacco, and using the internet.  Can you get control over a brain disease?  The only way to get over an addiction is to stop using the substance. Not using it lets your brain recover and go back to its normal functioning. You can relearn how to find pleasure in other things again. But your brain will always be at risk for addiction. Addiction is very powerful. So you usually will need medical help and social support for long-term success.  Addiction is a chronic condition. It s common for people who are recovering from addiction to start using the substance again (called a relapse). This doesn t mean that treatment doesn t work. Just like other chronic health conditions, addiction requires ongoing treatment that changes as the person s needs change.    Date Last Reviewed: 5/1/2017 2000-2017 [employer]. [address] [place] [address], [place] [address]. All rights reserved. This information is not intended as a substitute for professional medical care. Always follow your healthcare professional's instructions.

## 2017-12-27 ENCOUNTER — RECORDS - HEALTHEAST (OUTPATIENT)
Dept: ADMINISTRATIVE | Facility: OTHER | Age: 47
End: 2017-12-27

## 2017-12-27 PROCEDURE — 90853 GROUP PSYCHOTHERAPY: CPT

## 2017-12-27 PROCEDURE — 12400006 ZZH R&B MH INTERMEDIATE

## 2017-12-27 PROCEDURE — 25000132 ZZH RX MED GY IP 250 OP 250 PS 637: Performed by: PSYCHIATRY & NEUROLOGY

## 2017-12-27 PROCEDURE — 90791 PSYCH DIAGNOSTIC EVALUATION: CPT

## 2017-12-27 RX ORDER — NALTREXONE HYDROCHLORIDE 50 MG/1
50 TABLET, FILM COATED ORAL DAILY
Status: DISCONTINUED | OUTPATIENT
Start: 2017-12-28 | End: 2017-12-27

## 2017-12-27 RX ORDER — DULOXETIN HYDROCHLORIDE 30 MG/1
30 CAPSULE, DELAYED RELEASE ORAL DAILY
Status: DISCONTINUED | OUTPATIENT
Start: 2017-12-27 | End: 2018-01-02

## 2017-12-27 RX ADMIN — MULTIPLE VITAMINS W/ MINERALS TAB 1 TABLET: TAB at 07:52

## 2017-12-27 RX ADMIN — TRAZODONE HYDROCHLORIDE 100 MG: 50 TABLET ORAL at 21:06

## 2017-12-27 RX ADMIN — HYDROXYZINE HYDROCHLORIDE 50 MG: 25 TABLET ORAL at 07:52

## 2017-12-27 RX ADMIN — GABAPENTIN 300 MG: 300 CAPSULE ORAL at 21:06

## 2017-12-27 RX ADMIN — ESCITALOPRAM 20 MG: 20 TABLET, FILM COATED ORAL at 07:52

## 2017-12-27 RX ADMIN — NICOTINE 1 PATCH: 21 PATCH, EXTENDED RELEASE TRANSDERMAL at 07:53

## 2017-12-27 RX ADMIN — DULOXETINE HYDROCHLORIDE 30 MG: 30 CAPSULE, DELAYED RELEASE PELLETS ORAL at 13:26

## 2017-12-27 RX ADMIN — GABAPENTIN 300 MG: 300 CAPSULE ORAL at 07:52

## 2017-12-27 RX ADMIN — HYDROXYZINE HYDROCHLORIDE 50 MG: 25 TABLET ORAL at 16:59

## 2017-12-27 RX ADMIN — GABAPENTIN 300 MG: 300 CAPSULE ORAL at 16:14

## 2017-12-27 RX ADMIN — HYDROXYZINE HYDROCHLORIDE 50 MG: 25 TABLET ORAL at 12:56

## 2017-12-27 ASSESSMENT — ACTIVITIES OF DAILY LIVING (ADL): DRESS: STREET CLOTHES

## 2017-12-27 NOTE — PLAN OF CARE
"Problem: Depressive Symptoms  Goal: Depressive Symptoms  Signs and symptoms of listed problems will be absent or manageable.   Outcome: No Change  [facility] voluntarily. Patient came from  treatment at \"[facility] Due to her depression and SI, the staff there recommended she go to the hospital to get stabilized for this. Patient currently denies SI but states these thoughts have been prevalent this past month with no specific plan. Her stressors include breaking up with a long-term relationship, and possibly losing her job. Patient has plans to go back once she is discharged. Per interview, the patient has been sober from Alcohol since the end of November and sober from Benzos since December 5th. Her u-tox was positive due to a phenobarbital taper. Patient is very serious about her sobriety and motivated to get better.   Nursing assessment complete including patient and medication profiles. Risk assessments completed addressing suicide,fall,skin,nutrition and safety issues. Care plan initiated. Assessments reviewed with physician and admit orders received. Welcome packet reviewed with patient. Information reviewed includes getting emergency help, preventing infections, understanding your care, using medication safely, reducing falls, preventing pressure ulcers, smoking cessation, powerful choices and Patients Bill of Rights. Pt. given tour of the unit and instruction on use of facility including emergency call light. Program schedule reviewed with patient. Questions regarding the unit addressed. Pt. Search completed and belongings inventoried.          "

## 2017-12-27 NOTE — PLAN OF CARE
Problem: General Plan of Care (Inpatient Behavioral)  Goal: Discharge Planning  Patient requested to speak with  this afternoon. Patient was noted to be quite distraught and tearful. She was requesting to transfer to a different mental health unit that would offer individual therapy. Patient stated that she does not like being here and does not feel that she has any peers here to support her.  discussed with her that any inpatient mental health unit is going to be focused on short term acute management of symptoms.  reviewed different courses of action that she could take. If she was insisting on leaving, she could sign a 12 hour intent to leave.  stated that if she did sign this that physician would need to be called. If he determined that she would not be safe to leave then she could possibly be put on a 72 hour hold. Alternately he could also choose to discharge her against medical advice and then she would not have any aftercare plans set up or any discharge medications prescribed. As patient is currently here voluntarily, she was encouraged to stay at least until tomorrow morning so that she can speak to physician and put together a discharge plan. Patient reluctantly agreed to stay until morning and speak with physician at that time.  reviewed with her that returning to [facility] was contingent on her getting her mental health symptoms stabilized and if she leaves prematurely that she might not have a place to go. Patient did acknowledge this point.  Patient was encouraged to work on the AA 12 Steps and to focus on building self care skills while she is here. She was also encouraged to speak with her staff for any further concerns this evening.

## 2017-12-27 NOTE — PHARMACY-ADMISSION MEDICATION HISTORY
Admission medication history interview status for the 12/26/2017  admission is complete. See EPIC admission navigator for prior to admission medications     Medication history source reliability:Good    Actions taken by pharmacist (provider contacted, etc):Pt weaned off Effexor 2 days ago, removed from list. Does not use Albuterol inhaler     Additional medication history information not noted on PTA med list :None    Medication reconciliation/reorder completed by provider prior to medication history? No    Time spent in this activity: 15 minutes    Prior to Admission medications    Medication Sig Last Dose Taking? Auth Provider   hydrOXYzine (ATARAX) 25 MG tablet Take 1-2 tablets (25-50 mg) by mouth every 4 hours as needed for anxiety 12/26/2017 at 1200 Yes Kolton Samson MD   gabapentin (NEURONTIN) 300 MG capsule Take 1 capsule (300 mg) by mouth 3 times daily 12/26/2017 at am Yes Kolton Samson MD   escitalopram (LEXAPRO) 20 MG tablet Take 1 tablet (20 mg) by mouth daily 12/26/2017 at am Yes Kolton Samson MD   traZODone (DESYREL) 50 MG tablet Take 1-3 tablets ( mg) by mouth At Bedtime 12/25/2017 at hs Yes Kolton Samson MD   multivitamin, therapeutic with minerals (THERA-VIT-M) TABS tablet Take 1 tablet by mouth daily 12/26/2017 at am Yes Kolotn Samson MD   nicotine (NICODERM CQ) 21 MG/24HR 24 hr patch Place 1 patch onto the skin daily NOT ON, but would like  Kolton Samson MD

## 2017-12-27 NOTE — PLAN OF CARE
"Problem: Depressive Symptoms  Goal: Depressive Symptoms  Signs and symptoms of listed problems will be absent or manageable.   Outcome: No Change  Pt was calm and pleasant most of the shift.  She describes the breakup with her boyfriend of 7 years as abrupt; one day he blocked her phone number and on Facebook and she hasn't been able to get ahold of him in 2 months.  She wants to discharge back to [facility] in [place].  She attended most groups, and had difficult phone calls with mom and dad, crying in both the lounge and hallway. She often started phone calls with \"Hey, I'm in a mental hospital.\"  She also displayed some poor boundaries saying, \"I love (another patient), I'll miss her!\"      "

## 2017-12-27 NOTE — PROGRESS NOTES
12/26/17 2009   Patient Belongings   Did you bring any home meds/supplements to the hospital?  Yes   Disposition of Belongings Other (see comment)   Belongings Search Yes   Clothing Search Yes   Second Staff yes   General Info Comment secured in patients locker       Pink [place] Secret tote bag  o Black winter jacket  o Pair of tennis shoes with laces     Black pants x3    Pink winter hat x2     Black hat (under armor)     Light blue pants    Pink tank top     White long sleeve shirt x2    Beige long sleeve shirt    Gray long sleeve shirt     Gray camouflage long sleeve shirt     white sweatshirt (no hoodie)     turquoise long sleeve shirt     Black and white striped PJ pants     Black and white PJ tank top    Abbi Gold iPhone plus     0.25 cent x1 and 0,05 cent x1    17 cigarettes in a Marralbro pack    Lighter x1    toiletries in plastic bag     razor x1 and razor head pieces x2    eyeglasses with pink polkadot case    light pink watch    orange wristband and purple wristband    80 dollars    heart ring    Iphone charging cable and adapter    necklace (moon and stars)    black north face vest     wired bra    Underwear x5    Grey striped sweater/cardigan    White and black Joy s Secret tote bag  o two lipsticks  o 2 make up brushes  o  round hairbrush  o Multiple hair ties   o Small Joy s Secret bag inside tote bag  o  12 steps and 12 traditions  book  o  live every moment  journal book  o Loose paper  o spiraled notebook   o  celebrate recovery  study Bible book  o  facing codependency  book   o Neutrogena exfoliant face wash,   o Make up bag with make up and make up brushes inside    Luggage (not checked)    Security Envelope #[id number] and [id number]    Hydroxyzine 25mg (113 tablets)  Escitalopram 10mg (34 tablets)  Ondansetron 4mg (2 tablets)  Venlafaxine 37.5mg (24 capsules)  Gabapentin 300mg (75 capsules)  Clindamycin 150mg (24 capsules)  Trazodone 100mg (27 tablets)  Ondansetron 4mg (20  tablets)  Trazodone 100mg (13tablets)  Amoxicillin 500mg (11 capsules)  Venlafaxine 150mg (18 capsules)  Trazodone 150mg (13 tablets and half)  Trazodone 50mg (89 tablets)  Amoxicillin 500mg (19 capsules)  Hydroxyzine Haylie 50mg (46 capsules)  Escitalopram 20mg (25 tablets)  Venlafaxine 75mg (3 capsules)  Certavite vitamins  Vitamin B-1  Ventolin inhaler x2  Flovent     Admission:  I am responsible for any personal items that are not sent to the safe or pharmacy.  [facility] is not responsible for loss, theft or damage of any property in my possession.    Signature:  _________________________________ Date: _______  Time: _____                                              Staff Signature:  ____________________________ Date: ________  Time: _____      2nd Staff person, if patient is unable/unwilling to sign:    Signature: ________________________________ Date: ________  Time: _____     Discharge:  [facility] has returned all of my personal belongings:    Signature: _________________________________ Date: ________  Time: _____                                          Staff Signature:  ____________________________ Date: ________  Time: _____
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
## 2017-12-27 NOTE — PROGRESS NOTES
"SPIRITUAL HEALTH SERVICES Progress Note  [facility]    Initial visit per pt request.  Pt shared how important her spirituality and sobriety are to her, and invited conversation about how to promote these in the context of her \"relapse.\"  Pt admits that her self-talk has become highly critical and she easily imagines how disappointed everyone must be in her.  Therefore, SH focused conversation on steps 1-5 of the 12-Steps, and proposed several scripture texts aimed at reassuring pt of her worth and God's loving commitment to her.  SH also affirmed pt's commitment to her ongoing recovery and participation in support communities.                                                                                                                                           Julius Vega M.Div., [facility]  Staff   Pager [phone]    "

## 2017-12-28 PROCEDURE — 12400006 ZZH R&B MH INTERMEDIATE

## 2017-12-28 PROCEDURE — 25000132 ZZH RX MED GY IP 250 OP 250 PS 637: Performed by: PSYCHIATRY & NEUROLOGY

## 2017-12-28 PROCEDURE — 97150 GROUP THERAPEUTIC PROCEDURES: CPT | Mod: GO

## 2017-12-28 PROCEDURE — 90853 GROUP PSYCHOTHERAPY: CPT

## 2017-12-28 RX ORDER — QUETIAPINE FUMARATE 25 MG/1
25-50 TABLET, FILM COATED ORAL EVERY 4 HOURS PRN
Status: DISCONTINUED | OUTPATIENT
Start: 2017-12-28 | End: 2018-01-05 | Stop reason: HOSPADM

## 2017-12-28 RX ADMIN — GABAPENTIN 300 MG: 300 CAPSULE ORAL at 08:04

## 2017-12-28 RX ADMIN — HYDROXYZINE HYDROCHLORIDE 50 MG: 25 TABLET ORAL at 08:04

## 2017-12-28 RX ADMIN — GABAPENTIN 300 MG: 300 CAPSULE ORAL at 16:45

## 2017-12-28 RX ADMIN — QUETIAPINE FUMARATE 50 MG: 25 TABLET ORAL at 14:14

## 2017-12-28 RX ADMIN — QUETIAPINE FUMARATE 50 MG: 25 TABLET ORAL at 20:20

## 2017-12-28 RX ADMIN — TRAZODONE HYDROCHLORIDE 100 MG: 50 TABLET ORAL at 21:36

## 2017-12-28 RX ADMIN — GABAPENTIN 300 MG: 300 CAPSULE ORAL at 20:20

## 2017-12-28 RX ADMIN — ESCITALOPRAM 20 MG: 20 TABLET, FILM COATED ORAL at 08:04

## 2017-12-28 RX ADMIN — DULOXETINE HYDROCHLORIDE 30 MG: 30 CAPSULE, DELAYED RELEASE PELLETS ORAL at 08:04

## 2017-12-28 RX ADMIN — MULTIPLE VITAMINS W/ MINERALS TAB 1 TABLET: TAB at 08:04

## 2017-12-28 RX ADMIN — NICOTINE 1 PATCH: 21 PATCH, EXTENDED RELEASE TRANSDERMAL at 08:05

## 2017-12-28 ASSESSMENT — ACTIVITIES OF DAILY LIVING (ADL)
DRESS: STREET CLOTHES
GROOMING: INDEPENDENT
ORAL_HYGIENE: INDEPENDENT
LAUNDRY: WITH SUPERVISION

## 2017-12-28 NOTE — PROGRESS NOTES
"[facility] Psychiatric Progress Note      Interval History:   Pt seen, team meeting held with , nursing staff, OT, and PA's to review diagnosis and treatment plan.  Staff report the patient indicated that this mental health unit is not for her and wanted to leave last evening.  She has reportedly not been engaged in individual and group therapy.  She reportedly has been externalizing her locus control and blames her chemical use on her genetics.  Earlier this morning she did sign a 12 hour intent to leave the hospital because she did not feel she was getting the services she expected.  On direct interview, the patient reports that she was expecting to have access to a psychiatrist and therapist during her stay in the hospital.  She claims this was what she received in 1995 when she was on a mental health unit.  She states she feels that people are overly medicated on this unit and they're not the right fit for her.  She claims she is unable to talk to the other patients like she would have been doing with her alcoholic peers she had come to domínguez with at the treatment facility.  Patient claims that if she stays there for three day weekend she will be more depressed than she currently is.  She states she'll \"rather go to detox right now than be here.  I don't want to be here all weekend; it's a three day weekend and you will not be here for me to talk to.\"  She denies experiencing any self-harm thoughts plans or intent she reports future orientation she claims she wants to return to the retreat and ultimately sober leaving.  She states she wants to see a therapist and psychiatrist at [facility].  She states she feels the reason she was crying nonstop was because she was withdrawing from Suboxone, phenobarbital and Valium that she had been on at the detox which were discontinued immediately before she transitioned to [facility],.  She tells me \"I cry because my boyfriend left " "me I'm going to get better and stronger but I just need time. I don't have any thoughts of hurting myself and that's not my plan at all.  When I am sober I strong contributor to the society.  I need to go to AA eventually.\"     Review of systems:   The Review of Systems is negative other than noted in the HPI     Medications:       DULoxetine  30 mg Oral Daily     escitalopram  20 mg Oral Daily     gabapentin  300 mg Oral TID     multivitamin, therapeutic with minerals  1 tablet Oral Daily     traZODone   mg Oral At Bedtime     nicotine   Transdermal Q8H     nicotine   Transdermal Daily     nicotine  1 patch Transdermal Daily     pneumococcal vaccine  0.5 mL Intramuscular Prior to discharge     hydrOXYzine, acetaminophen    Mental Status Examination:     Appearance:  awake, alert, adequately groomed and casually dressed  Eye Contact:  better  Speech:  Clear and coherent.   Psychomotor Behavior:  no evidence of tardive dyskinesia, dystonia, or tics and fidgeting  Mood: Dysphoric   Affect:  Emotionally labile and intensity is heightened   Thought Process:  logical, linear and goal oriented no loose associations  Thought Content:  no evidence of suicidal ideation or homicidal ideation.  She denies self-harm thoughts plans or intent   Oriented to:  time, person, and place  Attention Span and Concentration:  limited  Recent and Remote Memory:  limited  Fund of Knowledge: appropriate  Muscle Strength and Tone: normal  Gait and Station: Normal  Insight:  fair  Judgment:  limited          Labs/Vitals:   No results found for this or any previous visit (from the past 24 hour(s)).  B/P: 108/58, T: 97.7, P: 51, R: 16    Impression:   This is a 47-year-old woman with established history of polysubstance abuse who recently underwent chemical detox at [facility] from where she was discharged to [facility] in [place].  She had presented with features of depression and was admitted to the " hospital for stabilization.        DIagnoses:     1.  Major depressive disorder, recurrent, severe, without psychotic features.   2.  Alcohol use disorder.   3.  Sedative/hypnotic use disorder.    4.  Opioid use disorder.   5.  Mild intermittent asthma.            Plan:   1. Written information given on medications. Side effects, risks, benefits reviewed.  2.  Maintain current medications.  3.  Continue individual, milieu and group therapy  4.  Continue hospitalization      Attestation:  Patient has been seen and evaluated by Lottie joel MD    PATIENT ID  Name: Mae Holder  MRN:[id number]  YOB: 1970

## 2017-12-28 NOTE — PLAN OF CARE
"Problem: Depressive Symptoms  Goal: Depressive Symptoms  Signs and symptoms of listed problems will be absent or manageable.   Patient was in her room at the start of the shift, she was tearful with staff during 1:1 talk.  She was reporting \"this is like the movies\" and said \"I do not belong here\".  She was asking if she could be \"transfered out of here\" after talking with SW she was in agrement to say until the morning and talk with the doctor.  Staff was spending extra time talking with her but she was very focused on other patients and was saying \"I am not like them\", \"they are drugged up\".  Staff reminded her of healthy boundaries and attempted to change the focus with minimal success.  Continued to be tearful on and off later in the shift.  This evening she was again needing some reminding about healthy boundaries as staff overheard her telling other patients they were on \"too many drugs\".      "

## 2017-12-28 NOTE — PLAN OF CARE
Problem: Depressive Symptoms  Goal: Depressive Symptoms  Signs and symptoms of listed problems will be absent or manageable.   Pt labile crying at times and other times intrusive into other patient's issues. Pt states what triggered her crying is a break up with her boyfriend of 7 years and she is grieving this. [facility] called pt and there is a process to go through and may not take her back according to pt. Pt states her anxiety is very high and needs something to calm her down. MD ordered Seroquel 25-50 every 4 hours.

## 2017-12-28 NOTE — PROGRESS NOTES
"BEHAVIORAL HEALTH NUTRITION ASSESSMENT      REASON FOR ASSESSMENT:  Admission screen:Reduced oral intake over the last month    CURRENT DIET AND NOURISHMENT ORDER:    Diet: Regular    Current Intake/Tolerance:  No intake recorded on the doc flowsheet. Patient not available/not appropriate to visit with at this time. Patient ordering adequate amounts of food and balanced meals as per review of menus.      ANTHROPOMETRICS:    Height: 5' 4\"  Weight: 60.7 kg  BMI: 22.97 kg/m2  IBW: 54.5 kg  %IBW: 110%  Weight History: Deferred    LABS:  Alb 3.9    NUTRITION STATUS VALIDATION:  Weight status: Normal BMI    INTERVENTION:    Nutrition Diagnosis:  No nutrition diagnosis at this time.    Implementation:   Nutrition education: Per MD order if appropriate .    Follow Up/Monitoring:   No need for further follow-up unless another consult received.    Jessica Abdalla RD  Pager [phone] (M-F)            [phone] ([facility] & Hol)      "

## 2017-12-28 NOTE — PLAN OF CARE
Problem: General Plan of Care (Inpatient Behavioral)  Goal: Team Discussion  Team Plan:    Outcome: Improving  BEHAVIORAL TEAM DISCUSSION    Participants: Dr. Cedeno, Case management, Nursing staff, Psych associates  Progress: Patient presents with a labile mood. She has been on and off requesting to leave as patient is voluntary. Patient was seen by psychiatrist and informed that he would not put her on a hold if requested to leave. Patient wants to go back to [facility]. Patient requested that staff call and tell [facility] she is caity. Psychiatrist stated that he can not say that she is stable at this time and patient will have to call [facility] herself.   Continued Stay Criteria/Rationale: Continued stabilization on current medication regime  Medical/Physical: Asthma  Precautions:   Behavioral Orders   Procedures     Code 1 - Restrict to Unit     Routine Programming     As clinically indicated     Status 15     Every 15 minutes.     Plan: Patient wants to go back to [facility] and will need to call them about being able to come back to their facility.  Rationale for change in precautions or plan: Patient continuing to stabilize mood and thought process.      Problem: Patient Care Overview  Goal: Team Discussion  Team Plan:    Outcome: Improving  BEHAVIORAL TEAM DISCUSSION    Participants: Dr. Cedeno, Case management, Nursing staff, Psych associates  Progress: Patient presents with a labile mood. She has been on and off requesting to leave as patient is voluntary. Patient was seen by psychiatrist and informed that he would not put her on a hold if requested to leave. Patient wants to go back to [facility]. Patient requested that staff call and tell [facility] she is caity. Psychiatrist stated that he can not say that she is stable at this time and patient will have to call [facility] herself.   Continued Stay Criteria/Rationale: Continued stabilization on current medication  regime  Medical/Physical: Asthma  Precautions:   Behavioral Orders   Procedures     Code 1 - Restrict to Unit     Routine Programming     As clinically indicated     Status 15     Every 15 minutes.     Plan: Patient wants to go back to [facility] and will need to call them about being able to come back to their facility.  Rationale for change in precautions or plan: Patient continuing to stabilize mood and thought process.

## 2017-12-28 NOTE — PLAN OF CARE
Problem: General Plan of Care (Inpatient Behavioral)  Goal: Discharge Planning  Patient attended Process Group today. She continues to struggle with appropriate boundaries at times. She was observed giggling and carrying on a side conversation with a male patient despite  establishing a ground rule for the group of no side conversations. She was able to exhibit fair insight at times, but at other times she expressed helplessness over her situation and lack of insight into her mental health issues. She remains hyper focused on her addiction issues instead.

## 2017-12-28 NOTE — PLAN OF CARE
"Problem: General Plan of Care (Inpatient Behavioral)  Goal: Discharge Planning   met with patient following team meeting today to discuss discharge plans. Patient stated that she has tried to call [facility] but has yet to hear back from anyone. She is still insisting that physician needs to confirm that she is stable enough to return to [facility].  discussed that physician has indicated that any return to [facility] is between her and the facility as they do not engage in any clinical care such as administering medications. Patient then stated that if she can not return to [facility] that the hospital has to find her a place to go.  discussed with patient that as she is on medical assistance that she would have to have a Rule 25 chemical dependency assessment done by her county of residence before any referrals could be made.  attempted to verify with patient what county she actually resides in. Patient's mailing address in chart is in [facility]. Patient gave  the address of the [facility] in [facility] as her \"official\" address, although she is not currently residing there. She then said that she is using the address of a friend in [place] ([facility]) as a place for her mail to be sent to.  gave patient information on chemical health services for [facility], [facility] and [facility] and encouraged her to call and determine which county might be able to do a Rule 25 assessment for her if she can not return to [facility]. Patient is more accepting of staying inpatient in order to stabilize and come up with a more solid discharge plan.      met with patient this afternoon. She states that she spoke with [facility] and she would need to go to the county offices there to apply for a Rule 25 assessment. She states that she has left messages with [place] and [facility] for call back. She completed Rule " 25 applications for both [place] and [facility] which have been faxed to the respective [facility]. It remains unclear which [facility] is actually her county of residence. Patient remains labile in mood and indecisive at times in regard to her participation in treatment planning.

## 2017-12-28 NOTE — PLAN OF CARE
Problem: General Plan of Care (Inpatient Behavioral)  Goal: Discharge Planning  Patient attended Process group on 12/27/17.Participation was complete. Patient exceeded boundaries at times, wanting to give detailed information about herself and to give direct advice to other group members. She appeared frustrated when asked to discontinue.

## 2017-12-29 PROCEDURE — 97150 GROUP THERAPEUTIC PROCEDURES: CPT | Mod: GO

## 2017-12-29 PROCEDURE — 12400006 ZZH R&B MH INTERMEDIATE

## 2017-12-29 PROCEDURE — 25000132 ZZH RX MED GY IP 250 OP 250 PS 637: Performed by: PSYCHIATRY & NEUROLOGY

## 2017-12-29 PROCEDURE — 90853 GROUP PSYCHOTHERAPY: CPT

## 2017-12-29 PROCEDURE — 90732 PPSV23 VACC 2 YRS+ SUBQ/IM: CPT | Performed by: PSYCHIATRY & NEUROLOGY

## 2017-12-29 PROCEDURE — 25000128 H RX IP 250 OP 636: Performed by: PSYCHIATRY & NEUROLOGY

## 2017-12-29 RX ADMIN — QUETIAPINE FUMARATE 25 MG: 25 TABLET ORAL at 19:20

## 2017-12-29 RX ADMIN — ESCITALOPRAM 20 MG: 20 TABLET, FILM COATED ORAL at 08:38

## 2017-12-29 RX ADMIN — GABAPENTIN 300 MG: 300 CAPSULE ORAL at 08:38

## 2017-12-29 RX ADMIN — DULOXETINE HYDROCHLORIDE 30 MG: 30 CAPSULE, DELAYED RELEASE PELLETS ORAL at 08:38

## 2017-12-29 RX ADMIN — PNEUMOCOCCAL VACCINE POLYVALENT 0.5 ML
25; 25; 25; 25; 25; 25; 25; 25; 25; 25; 25; 25; 25; 25; 25; 25; 25; 25; 25; 25; 25; 25; 25 INJECTION, SOLUTION INTRAMUSCULAR; SUBCUTANEOUS at 13:56

## 2017-12-29 RX ADMIN — MULTIPLE VITAMINS W/ MINERALS TAB 1 TABLET: TAB at 08:38

## 2017-12-29 RX ADMIN — HYDROXYZINE HYDROCHLORIDE 50 MG: 25 TABLET ORAL at 08:40

## 2017-12-29 RX ADMIN — GABAPENTIN 300 MG: 300 CAPSULE ORAL at 15:46

## 2017-12-29 RX ADMIN — NICOTINE 1 PATCH: 21 PATCH, EXTENDED RELEASE TRANSDERMAL at 08:38

## 2017-12-29 RX ADMIN — GABAPENTIN 300 MG: 300 CAPSULE ORAL at 21:34

## 2017-12-29 RX ADMIN — QUETIAPINE FUMARATE 25 MG: 25 TABLET ORAL at 10:15

## 2017-12-29 RX ADMIN — TRAZODONE HYDROCHLORIDE 100 MG: 50 TABLET ORAL at 21:34

## 2017-12-29 RX ADMIN — QUETIAPINE FUMARATE 50 MG: 25 TABLET ORAL at 13:54

## 2017-12-29 NOTE — PLAN OF CARE
Problem: Depressive Symptoms  Goal: Depressive Symptoms  Signs and symptoms of listed problems will be absent or manageable.   Pt continues to display poor boundaries (telling another pt to shower, asking a nurse about another pt's meds). She has accepted that she will be here over the weekend. Admits to her depression, but jokes about it at times. Attended OT to use the computer only, and participated minimally in wrap up group.

## 2017-12-29 NOTE — PLAN OF CARE
Problem: General Rehab Plan of Care  Goal: Occupational Therapy Goals  The patient and/or their representative will achieve their patient-specific goals related to the plan of care.  The patient-specific goals include:  INITIAL O.T. ASSESSMENT   Details:  Pt declined most OT groups offered during this hospitalization. Today, she attended OT group briefly (not billed); she gathered materials, changed her mind multiple times, and did not initiate a task. Pt attended OT Life Skills group today. Affect was superficially bright. Pt appeared disinterested and was observed passing notes with a peer. She engaged minimally in the group discussion. Attention was fair, about 20 minutes in structured setting.

## 2017-12-29 NOTE — PROGRESS NOTES
[facility] Psychiatric Progress Note      Interval History:   Pt seen, team meeting held with , nursing staff, OT, and PA's to review diagnosis and treatment plan.  Staff report the patient has been displaying very poor boundaries.  She reportedly has become attached to a male peer on the unit.  She reports she is doing better today and attributes this to the addition of quetiapine to her regimen.  However she states she is very worried that the medication will make her gain weight.  She states she was not reassured by her conversations with [facility] yesterday as he did not guarantee that they'll keep her bed even if she is cleared by the undersigned.  She is therefore focused on an alternative plan.  She states she would have, Blue Plus insurance from 1 January.  She is hoping that she can secure a treatment facility without insurance even though she will prefer to remain on her current treatment facility.  She states she would like to see what she is like at the end of hospitalization as she feels her medications are helping her to stabilize her mood.  She denies self-harm thoughts plans or intent.  She reports future orientation.     Review of systems:   The Review of Systems is negative other than noted in the HPI     Medications:       DULoxetine  30 mg Oral Daily     escitalopram  20 mg Oral Daily     gabapentin  300 mg Oral TID     multivitamin, therapeutic with minerals  1 tablet Oral Daily     traZODone   mg Oral At Bedtime     nicotine   Transdermal Q8H     nicotine   Transdermal Daily     nicotine  1 patch Transdermal Daily     pneumococcal vaccine  0.5 mL Intramuscular Prior to discharge     QUEtiapine, hydrOXYzine, acetaminophen    Mental Status Examination:     Appearance:  awake, alert, adequately groomed and casually dressed  Eye Contact:  better  Speech:  Clear and coherent.   Psychomotor Behavior:  no evidence of tardive dyskinesia, dystonia, or tics and  fidgeting  Mood: Better but still teary  Affect:  Emotionally labile and intensity is heightened   Thought Process:  logical, linear and goal oriented no loose associations  Thought Content:  no evidence of suicidal ideation or homicidal ideation.  She denies self-harm thoughts plans or intent   Oriented to:  time, person, and place  Attention Span and Concentration:  limited  Recent and Remote Memory:  limited  Fund of Knowledge: appropriate  Muscle Strength and Tone: normal  Gait and Station: Normal  Insight:  fair  Judgment:  limited          Labs/Vitals:   No results found for this or any previous visit (from the past 24 hour(s)).  B/P: 108/58, T: 97.7, P: 51, R: 16    Impression:   This is a 47-year-old woman with established history of polysubstance abuse who recently underwent chemical detox at [facility] from where she was discharged to [facility] in [place].  She had presented with features of depression and was admitted to the hospital for stabilization.        DIagnoses:     1.  Major depressive disorder, recurrent, severe, without psychotic features.   2.  Alcohol use disorder.   3.  Sedative/hypnotic use disorder.    4.  Opioid use disorder.   5.  Mild intermittent asthma.            Plan:   1. Written information given on medications. Side effects, risks, benefits reviewed.  2.  Continue p.r.n. Seroquel.  Increase Cymbalta to 60 mg daily.  3.  Continue individual, milieu and group therapy  4.  Continue hospitalization      Attestation:  Patient has been seen and evaluated by Lottie joel MD    PATIENT ID  Name: Mae Holder  MRN:[id number]  YOB: 1970

## 2017-12-29 NOTE — PLAN OF CARE
Problem: General Plan of Care (Inpatient Behavioral)  Goal: Discharge Planning  Patient attended Process Group and participated appropriately. Patient demonstrated good insight into the topic of discussion today. She was more engaged in group discussion today and exhibited more appropriate boundaries with her peers.

## 2017-12-29 NOTE — PLAN OF CARE
Problem: Depressive Symptoms  Goal: Depressive Symptoms  Signs and symptoms of listed problems will be absent or manageable.   Outcome: No Change  Pt was calm and cooperative, pleasant and social with staff and peers throughout the day. Attended groups and participated appropriately. Respectful with staff and peers. Stated she is better after starting quetiapine regiment, but is worried that the medication will make her gain weight. Pt was told that it's possible, but the medications affect everyone differently.

## 2017-12-30 PROCEDURE — 25000132 ZZH RX MED GY IP 250 OP 250 PS 637: Performed by: PSYCHIATRY & NEUROLOGY

## 2017-12-30 PROCEDURE — 12400006 ZZH R&B MH INTERMEDIATE

## 2017-12-30 PROCEDURE — 90853 GROUP PSYCHOTHERAPY: CPT

## 2017-12-30 RX ADMIN — MULTIPLE VITAMINS W/ MINERALS TAB 1 TABLET: TAB at 07:42

## 2017-12-30 RX ADMIN — NICOTINE 1 PATCH: 21 PATCH, EXTENDED RELEASE TRANSDERMAL at 07:42

## 2017-12-30 RX ADMIN — TRAZODONE HYDROCHLORIDE 50 MG: 50 TABLET ORAL at 22:00

## 2017-12-30 RX ADMIN — QUETIAPINE FUMARATE 50 MG: 25 TABLET ORAL at 07:42

## 2017-12-30 RX ADMIN — GABAPENTIN 300 MG: 300 CAPSULE ORAL at 07:42

## 2017-12-30 RX ADMIN — QUETIAPINE FUMARATE 50 MG: 25 TABLET ORAL at 18:37

## 2017-12-30 RX ADMIN — HYDROXYZINE HYDROCHLORIDE 50 MG: 25 TABLET ORAL at 15:02

## 2017-12-30 RX ADMIN — GABAPENTIN 300 MG: 300 CAPSULE ORAL at 22:00

## 2017-12-30 RX ADMIN — GABAPENTIN 300 MG: 300 CAPSULE ORAL at 15:56

## 2017-12-30 RX ADMIN — ESCITALOPRAM 20 MG: 20 TABLET, FILM COATED ORAL at 07:42

## 2017-12-30 RX ADMIN — QUETIAPINE FUMARATE 50 MG: 25 TABLET ORAL at 13:08

## 2017-12-30 RX ADMIN — DULOXETINE HYDROCHLORIDE 30 MG: 30 CAPSULE, DELAYED RELEASE PELLETS ORAL at 07:42

## 2017-12-30 ASSESSMENT — ACTIVITIES OF DAILY LIVING (ADL)
ORAL_HYGIENE: INDEPENDENT
DRESS: STREET CLOTHES;INDEPENDENT
ORAL_HYGIENE: INDEPENDENT
GROOMING: HANDWASHING;INDEPENDENT
LAUNDRY: WITH SUPERVISION
DRESS: STREET CLOTHES
GROOMING: INDEPENDENT

## 2017-12-30 NOTE — PLAN OF CARE
Problem: Depressive Symptoms  Goal: Depressive Symptoms  Signs and symptoms of listed problems will be absent or manageable.   Patient visible on unit participates in groups and is social with peers. Pt states she really  Wants to go back to the [facility] and feels that she is more stable and is not crying  So much over her loss. Pt states she never was suicidal but just sad over losing her BF of 7 years and misses her job.Her friend dropped off some of her cosmetics ect.. And she saw her work badge and did cry for a minute when she saw this. She states her coworkers are so good to her and mises them.Pt hopes to go back ann [facility] Rx early next week

## 2017-12-30 NOTE — PLAN OF CARE
"Problem: Depressive Symptoms  Goal: Depressive Symptoms  Signs and symptoms of listed problems will be absent or manageable.   Pt has been irritable, making rude comments about pts who chose to be here voluntarily. Close with an older male peer. Social and active in the unit. Attended OT but did could not figure out a project to do because she believes she is not \"creative\". Read in bed and went to bed early.       "

## 2017-12-31 PROCEDURE — 25000132 ZZH RX MED GY IP 250 OP 250 PS 637: Performed by: PSYCHIATRY & NEUROLOGY

## 2017-12-31 PROCEDURE — 90853 GROUP PSYCHOTHERAPY: CPT

## 2017-12-31 PROCEDURE — 12400006 ZZH R&B MH INTERMEDIATE

## 2017-12-31 RX ADMIN — QUETIAPINE FUMARATE 50 MG: 25 TABLET ORAL at 10:39

## 2017-12-31 RX ADMIN — DULOXETINE HYDROCHLORIDE 30 MG: 30 CAPSULE, DELAYED RELEASE PELLETS ORAL at 07:56

## 2017-12-31 RX ADMIN — GABAPENTIN 300 MG: 300 CAPSULE ORAL at 15:58

## 2017-12-31 RX ADMIN — NICOTINE 1 PATCH: 21 PATCH, EXTENDED RELEASE TRANSDERMAL at 07:56

## 2017-12-31 RX ADMIN — ESCITALOPRAM 20 MG: 20 TABLET, FILM COATED ORAL at 07:56

## 2017-12-31 RX ADMIN — TRAZODONE HYDROCHLORIDE 150 MG: 50 TABLET ORAL at 21:53

## 2017-12-31 RX ADMIN — QUETIAPINE FUMARATE 50 MG: 25 TABLET ORAL at 05:35

## 2017-12-31 RX ADMIN — GABAPENTIN 300 MG: 300 CAPSULE ORAL at 21:53

## 2017-12-31 RX ADMIN — QUETIAPINE FUMARATE 50 MG: 25 TABLET ORAL at 17:56

## 2017-12-31 RX ADMIN — MULTIPLE VITAMINS W/ MINERALS TAB 1 TABLET: TAB at 07:56

## 2017-12-31 RX ADMIN — GABAPENTIN 300 MG: 300 CAPSULE ORAL at 07:56

## 2017-12-31 ASSESSMENT — ACTIVITIES OF DAILY LIVING (ADL)
ORAL_HYGIENE: INDEPENDENT
DRESS: STREET CLOTHES;INDEPENDENT
GROOMING: HANDWASHING;INDEPENDENT

## 2017-12-31 NOTE — PLAN OF CARE
Problem: Depressive Symptoms  Goal: Depressive Symptoms  Signs and symptoms of listed problems will be absent or manageable.   Outcome: No Change  Pt has been active on the milieu participating in groups and socializing with peers.  She presents as depressed and anxious, but brightens with conversation. Pt stated she has been feeling very anxious which has been making it very difficult for her to eat.  Pt expressed concerns about her possibly not being able to go back to [facility]. She stated she will continue to work on the 12 steps like they were teaching at [facility] with the hopes that she will be able to return back to treatment.

## 2017-12-31 NOTE — PROGRESS NOTES
"SPIRITUAL HEALTH SERVICES Progress Note  [facility], after spirituality group. The patient participated in group and offered reflections from her growing up that related to the \"[place].\" Following the group time, the patient voiced the importance of her Jehovah's witness víctor and having the chance to continue AA at in treatment. The patient is anxious about not being permitted to return to in treatment. The patient asked for prayer.      will pray for patient in the meditation sanctuary towards the end of the day.            Laz Herndon  Chaplain Resident  "

## 2018-01-01 PROCEDURE — 12400006 ZZH R&B MH INTERMEDIATE

## 2018-01-01 PROCEDURE — 25000132 ZZH RX MED GY IP 250 OP 250 PS 637: Performed by: PSYCHIATRY & NEUROLOGY

## 2018-01-01 PROCEDURE — 93010 ELECTROCARDIOGRAM REPORT: CPT | Performed by: INTERNAL MEDICINE

## 2018-01-01 PROCEDURE — 93005 ELECTROCARDIOGRAM TRACING: CPT

## 2018-01-01 RX ADMIN — DULOXETINE HYDROCHLORIDE 30 MG: 30 CAPSULE, DELAYED RELEASE PELLETS ORAL at 07:47

## 2018-01-01 RX ADMIN — GABAPENTIN 300 MG: 300 CAPSULE ORAL at 15:48

## 2018-01-01 RX ADMIN — ESCITALOPRAM 20 MG: 20 TABLET, FILM COATED ORAL at 07:47

## 2018-01-01 RX ADMIN — NICOTINE 1 PATCH: 21 PATCH, EXTENDED RELEASE TRANSDERMAL at 07:47

## 2018-01-01 RX ADMIN — GABAPENTIN 300 MG: 300 CAPSULE ORAL at 21:38

## 2018-01-01 RX ADMIN — QUETIAPINE FUMARATE 50 MG: 25 TABLET ORAL at 21:38

## 2018-01-01 RX ADMIN — GABAPENTIN 300 MG: 300 CAPSULE ORAL at 07:47

## 2018-01-01 RX ADMIN — QUETIAPINE FUMARATE 50 MG: 25 TABLET ORAL at 09:31

## 2018-01-01 RX ADMIN — QUETIAPINE FUMARATE 50 MG: 25 TABLET ORAL at 14:08

## 2018-01-01 RX ADMIN — TRAZODONE HYDROCHLORIDE 100 MG: 50 TABLET ORAL at 21:38

## 2018-01-01 RX ADMIN — MULTIPLE VITAMINS W/ MINERALS TAB 1 TABLET: TAB at 07:47

## 2018-01-01 ASSESSMENT — ACTIVITIES OF DAILY LIVING (ADL)
HYGIENE/GROOMING: INDEPENDENT
ORAL_HYGIENE: INDEPENDENT
LAUNDRY: WITH SUPERVISION
DRESS: STREET CLOTHES;INDEPENDENT

## 2018-01-01 NOTE — PLAN OF CARE
Problem: General Plan of Care (Inpatient Behavioral)  Goal: Individualization/Patient Specific Goal (IP Behavioral)  The patient and/or their representative will achieve their patient-specific goals related to the plan of care.    The patient-specific goals include:    1. Absence of suicidal ideation with safety plan in place.  2. Effective medication regime with patient compliance.  3. Stabilization of mood and thought processes.  4. Improved insight into mental health issues.  5. Able to identify and utilize positive coping skills.  6. Plan in place to address substance abuse issues.  7. Plan in place for ongoing treatment and support.       Outcome: Improving  Pleasant and cooperative. Medication compliant. Attending groups and participating appropriately. Continues to report feeling hopeful. Denies thoughts of self harm or suicide. Had a visitor today, went well. Awaiting discharge back to treatment center.

## 2018-01-01 NOTE — PROGRESS NOTES
"[facility] Psychiatric Progress Note      Interval History:   Pt seen. Reports she is doing \"really good.\" Reports she feels optimistic. She notes that her mood has been more stable \"without breakdowns\" this weekend. Energy is improving. Appetite good. Denies suicidal or homicidal ideations. Denies psychotic symptoms. Reports she \"wants to get back to treatment. \" Finds groups helpful.      Review of systems:   The Review of Systems is negative other than noted in the HPI     Medications:       DULoxetine  30 mg Oral Daily     escitalopram  20 mg Oral Daily     gabapentin  300 mg Oral TID     multivitamin, therapeutic with minerals  1 tablet Oral Daily     traZODone   mg Oral At Bedtime     nicotine   Transdermal Q8H     nicotine   Transdermal Daily     nicotine  1 patch Transdermal Daily     QUEtiapine, hydrOXYzine, acetaminophen    Mental Status Examination:     Appearance:  awake, alert, adequately groomed and casually dressed  Eye Contact:  good  Speech:  Clear and coherent.   Psychomotor Behavior:  no evidence of tardive dyskinesia, dystonia, or tics and fidgeting  Mood: Good  Affect:  No longer labile, now stable, more euthymic with slight blunting  Thought Process:  logical, linear and goal oriented no loose associations  Thought Content:  no evidence of suicidal ideation or homicidal ideation.  She denies self-harm thoughts plans or intent   Oriented to:  time, person, and place  Attention Span and Concentration:  good  Recent and Remote Memory:  good  Fund of Knowledge: appropriate  Muscle Strength and Tone: normal based on visual inspection   Gait and Station: Normal  Insight:  Building   Judgment:  Improving           Labs/Vitals:   No results found for this or any previous visit (from the past 24 hour(s)).  B/P: 108/58, T: 97.7, P: 51, R: 16    Impression:   This is a 47-year-old woman with established history of polysubstance abuse who recently underwent chemical detox at " [facility] [place] from where she was discharged to [facility] in [place].  She had presented with features of depression and was admitted to the hospital for stabilization.      1/1/18  Feels much better overall. Reports mood is improving. Denies any crying spells over the weekend. She hopes to be accepted back to treatment. Tolerating medications well. She is relying on PRN Seroquel though since admission .      DIagnoses:     1.  Major depressive disorder, recurrent, severe, without psychotic features.   2.  Alcohol use disorder.   3.  Sedative/hypnotic use disorder.    4.  Opioid use disorder.   5.  Mild intermittent asthma.            Plan:   1. Written information given on medications. Side effects, risks, benefits reviewed. Discussed side effect risk of serotonin sydrome and cardiac arrythmias, including risk of death. Patient feels benefits outweigh risks based on current response to medications.   2.  Continue p.r.n. Seroquel.  Cymbalta to 60 mg daily. Also on Lexapro 20 mg. Given interaction risks will obtain EKG routine to monitor QTc.   3.  Continue individual, milieu and group therapy  4.  Continue hospitalization      Attestation:  Patient has been seen and evaluated by me,  Fadi Cervantes MD    PATIENT ID  Name: Mae Holder  MRN:[id number]  YOB: 1970

## 2018-01-02 PROCEDURE — 12400006 ZZH R&B MH INTERMEDIATE

## 2018-01-02 PROCEDURE — 25000132 ZZH RX MED GY IP 250 OP 250 PS 637: Performed by: PSYCHIATRY & NEUROLOGY

## 2018-01-02 PROCEDURE — 90853 GROUP PSYCHOTHERAPY: CPT

## 2018-01-02 RX ORDER — DULOXETIN HYDROCHLORIDE 60 MG/1
60 CAPSULE, DELAYED RELEASE ORAL DAILY
Status: DISCONTINUED | OUTPATIENT
Start: 2018-01-03 | End: 2018-01-05 | Stop reason: HOSPADM

## 2018-01-02 RX ADMIN — GABAPENTIN 300 MG: 300 CAPSULE ORAL at 21:59

## 2018-01-02 RX ADMIN — GABAPENTIN 300 MG: 300 CAPSULE ORAL at 07:48

## 2018-01-02 RX ADMIN — MULTIPLE VITAMINS W/ MINERALS TAB 1 TABLET: TAB at 07:48

## 2018-01-02 RX ADMIN — NICOTINE 1 PATCH: 21 PATCH, EXTENDED RELEASE TRANSDERMAL at 07:47

## 2018-01-02 RX ADMIN — TRAZODONE HYDROCHLORIDE 150 MG: 50 TABLET ORAL at 21:59

## 2018-01-02 RX ADMIN — QUETIAPINE FUMARATE 50 MG: 25 TABLET ORAL at 13:25

## 2018-01-02 RX ADMIN — DULOXETINE HYDROCHLORIDE 30 MG: 30 CAPSULE, DELAYED RELEASE PELLETS ORAL at 07:48

## 2018-01-02 RX ADMIN — QUETIAPINE FUMARATE 50 MG: 25 TABLET ORAL at 18:00

## 2018-01-02 RX ADMIN — GABAPENTIN 300 MG: 300 CAPSULE ORAL at 15:58

## 2018-01-02 RX ADMIN — ESCITALOPRAM 20 MG: 20 TABLET, FILM COATED ORAL at 07:48

## 2018-01-02 RX ADMIN — QUETIAPINE FUMARATE 50 MG: 25 TABLET ORAL at 09:02

## 2018-01-02 ASSESSMENT — ACTIVITIES OF DAILY LIVING (ADL): GROOMING: INDEPENDENT

## 2018-01-02 NOTE — PLAN OF CARE
Problem: General Plan of Care (Inpatient Behavioral)  Goal: Team Discussion  Team Plan:    Outcome: Improving  BEHAVIORAL TEAM DISCUSSION    Participants: Dr. Cedeno, Case management, Nursing staff, Psych associates  Progress: Patient states that she feels she has made significant improvement while here on the unit. Patient was frustrated that she could not go to [facility] this afternoon. Psychiatrist called facility and they stated that they would like the [facility] before coming back to [facility] to further stabilize. Patient continues to stabilize on medications and participates appropriately in milieu activities.   Continued Stay Criteria/Rationale: Further need for stabilization while pursing safe discharge planning.  Medical/Physical: n/a  Precautions:   Behavioral Orders   Procedures     Code 1 - Restrict to Unit     Routine Programming     As clinically indicated     Status 15     Every 15 minutes.     Plan: determine appropriate discharge plan for patient and continue to stabilize on current medications  Rationale for change in precautions or plan: Patient is improving and will likely attend CD treatment once discharged from hospital.      Problem: Patient Care Overview  Goal: Team Discussion  Team Plan:    Outcome: Improving  BEHAVIORAL TEAM DISCUSSION    Participants: Dr. Cedeno, Case management, Nursing staff, Psych associates  Progress: Patient states that she feels she has made significant improvement while here on the unit. Patient was frustrated that she could not go to [facility] this afternoon. Psychiatrist called facility and they stated that they would like the [facility] before coming back to [facility] to further stabilize. Patient continues to stabilize on medications and participates appropriately in milieu activities.   Continued Stay Criteria/Rationale: Further need for stabilization while pursing safe discharge planning.  Medical/Physical:  n/a  Precautions:   Behavioral Orders   Procedures     Code 1 - Restrict to Unit     Routine Programming     As clinically indicated     Status 15     Every 15 minutes.     Plan: determine appropriate discharge plan for patient and continue to stabilize on current medications  Rationale for change in precautions or plan: Patient is improving and will likely attend CD treatment once discharged from hospital.

## 2018-01-02 NOTE — PLAN OF CARE
Problem: General Plan of Care (Inpatient Behavioral)  Goal: Individualization/Patient Specific Goal (IP Behavioral)  The patient and/or their representative will achieve their patient-specific goals related to the plan of care.    The patient-specific goals include:    1. Absence of suicidal ideation with safety plan in place.  2. Effective medication regime with patient compliance.  3. Stabilization of mood and thought processes.  4. Improved insight into mental health issues.  5. Able to identify and utilize positive coping skills.  6. Plan in place to address substance abuse issues.  7. Plan in place for ongoing treatment and support.       Outcome: Improving  Present on unit, participating in groups and appropriate with peers. Cymbalta increased from 30 mg to 60mg. Verbalizes frustration and disappointment about not being able to go directly back to [facility] from hospital, however accepting of plan for chem dep assessment and treatment at [facility].

## 2018-01-02 NOTE — PROGRESS NOTES
Dr. Cedeno said he spoke to the [facility] and they want patient to go through [facility] ,then she can go back to [facility]. Called [facility] intake- was told patient had not had an evaluation while in detox, so will need one ordered. Then she will need a nurse to nurse consult- [phone], then scheduling for [facility]- [phone]. Call placed to Lorenza Witt to see about assessment availability.

## 2018-01-02 NOTE — PLAN OF CARE
Problem: Depressive Symptoms  Goal: Depressive Symptoms  Signs and symptoms of listed problems will be absent or manageable.   Outcome: No Change  Patient presents with full range affect, calm mood. Had a visitor this shift. Friendly with peers and staff. Present on unit. Optimistic about getting back into treatment, but still somewhat nervous. Feels much better than she did at admission. Had an EKG this shift to check QT intervals (due to interaction risks with medications). Appeared normal. Did laundry this shift. Attended peer-led AA meeting. Med-compliant.

## 2018-01-03 LAB — INTERPRETATION ECG - MUSE: NORMAL

## 2018-01-03 PROCEDURE — 12400006 ZZH R&B MH INTERMEDIATE

## 2018-01-03 PROCEDURE — H0001 ALCOHOL AND/OR DRUG ASSESS: HCPCS

## 2018-01-03 PROCEDURE — 90853 GROUP PSYCHOTHERAPY: CPT

## 2018-01-03 PROCEDURE — 25000132 ZZH RX MED GY IP 250 OP 250 PS 637: Performed by: PSYCHIATRY & NEUROLOGY

## 2018-01-03 RX ADMIN — DULOXETINE HYDROCHLORIDE 60 MG: 60 CAPSULE, DELAYED RELEASE ORAL at 08:54

## 2018-01-03 RX ADMIN — GABAPENTIN 300 MG: 300 CAPSULE ORAL at 21:01

## 2018-01-03 RX ADMIN — TRAZODONE HYDROCHLORIDE 150 MG: 50 TABLET ORAL at 21:01

## 2018-01-03 RX ADMIN — QUETIAPINE FUMARATE 50 MG: 25 TABLET ORAL at 08:55

## 2018-01-03 RX ADMIN — QUETIAPINE FUMARATE 50 MG: 25 TABLET ORAL at 17:15

## 2018-01-03 RX ADMIN — GABAPENTIN 300 MG: 300 CAPSULE ORAL at 16:23

## 2018-01-03 RX ADMIN — NICOTINE 1 PATCH: 21 PATCH, EXTENDED RELEASE TRANSDERMAL at 08:54

## 2018-01-03 RX ADMIN — GABAPENTIN 300 MG: 300 CAPSULE ORAL at 08:54

## 2018-01-03 RX ADMIN — MULTIPLE VITAMINS W/ MINERALS TAB 1 TABLET: TAB at 08:54

## 2018-01-03 RX ADMIN — ESCITALOPRAM 20 MG: 20 TABLET, FILM COATED ORAL at 08:54

## 2018-01-03 ASSESSMENT — ACTIVITIES OF DAILY LIVING (ADL)
ORAL_HYGIENE: INDEPENDENT
DRESS: STREET CLOTHES;INDEPENDENT
HYGIENE/GROOMING: INDEPENDENT

## 2018-01-03 NOTE — CONSULTS
1/3/18 cd consult.  Met with patient, recommending residential treatment at [facility].  I will complete evaluation paperwork and send referral to [facility] for review, will require nurse-nurse review prior to approval for priority waitlist.  I will follow up with  on unit tomorrow regarding status.

## 2018-01-03 NOTE — CONSULTS
CHEMICAL DEPENDENCY ASSESSMENT      EVALUATION COUNSELOR:  LISA Sam.   PATIENT'S ADDRESS:  [address] [place] [address].   TELEPHONE NUMBER:  [phone].   STATISTICS:  Date of Birth 1970.  Age:  47.  Sex:  Female.  Marital Status:  Single.   INSURANCE:  [employer].   REFERRAL SOURCE:  [facility] [place], station 77.   REFERRING PROVIDER:  Lottie Cedeno MD      REASON FOR EVALUATION:  Mae Holder is a 47 year old female with a history of depression, anxiety, asthma and alcohol and benzodiazepine abuse who presents to the ED on 12/26/17 for evaluation of depression. The patient recently finished detox on 12/19 at [facility] and went to a post-detox retreat voluntarily. While on retreat, her director told her she needed higher level of care due to her depression. Once she was discharged, she came here to the ED.     HEALTH HISTORY AND MEDICATIONS:  See medical record for complete medical history and medications administered.      HISTORY OF PREVIOUS TREATMENT AND COUNSELING:  Mae Holder reports multiple previous hospitalizations and dozens of detox admissions.  She was most recently detoxed at [facility] back on 12/14/2017 and was there for approximately 6 days.  Reports she has been to at least 3 treatment programs.  Most recently, prior to admission to the hospital, she was at [facility] for 3 days, but was deferred from there, needing higher level of care.  Reports previous program as she has been to [facility] twice, most recently was in 03/2017, and prior to that had been back in 2011 or 2012.  She reports she has been to support group meetings, none recently, and she denies any outpatient mental health providers.      HISTORY OF ALCOHOL AND DRUG USE:  Client reports alcohol use, age of first use 19.  Reports throughout the month of 11/2017 for about 3 weeks she was drinking daily up to a liter of alcohol per day.   Prior to that, she had no use since 03/2017.  Before treatment then, she had about a 2-month period where she was drinking daily, same pattern, up to a liter a day, and prior to that, she had 2-1/2 years of no use, last use 11/22/2017.  The patient also reports abuse of benzodiazepines, age of first use 45.  Reports this past month following her last hospitalization she was given a prescription for 62 mg pills of Ativan and she was taking nearly up to 6 per day, last use 12/13/2017.  She also reports abuse of pain pills, age of first use 42.  Reports she has used oxys as available, typically 1-3 days.  She will use every other week up to 100 mg a day, last use in mid 11/2017.  Reports most recently this past month she also drank 1 bottle of vanilla extract and she also reports she is a tobacco user, smokes a pack of cigarettes a day, last use 12/26/2017.  Patient denies any other substance use.      SUMMARY OF CHEMICAL DEPENDENCY SYMPTOMS ACKNOWLEDGED BY PATIENT:  The patient identifies with 11 out of the 11 DSM-V criteria for impression of a substance use disorder.      SUMMARY OF COLLATERAL DATA:  [facility] electronic health record was reviewed and discussed care plan with staff.      MENTAL STATUS ASSESSMENT:  Physical appearance and attire appropriate to situation.  Hygiene:  Well groomed.  Eye contact at examiner.  Speech regular, volume regular, quality fluid.  Cognitive perceptual reality based.  Cognition:  Memory intact, judgment intact, insight intact.  Orientation to time, place, person and situation.  Thought logical.  Hallucinations:  None.  General behavioral tone:  Cooperative.  Psychomotor activity:  No problem noted.  Gait:  No problem.  Mood appropriate.  Affect congruent and appropriate.      VULNERABLE ADULT ASSESSMENT:  This person is currently admitted to the hospital, therefore, is a categorized vulnerable adult according to [place] statute.      IMPRESSION:   1.  F10.20, alcohol use  disorder, severe.   2.  F11.10, opioid use disorder, mild.   3.  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   4.  F17.200, tobacco use disorder, severe.      [facility] PLACEMENT CRITERIA:   DIMENSION 1:  Intoxication Withdrawal -- Risk level 0.  The patient had no BAL on admission, but urine drug screen was positive for barbiturates.  She was monitored for withdrawal concerns and this day is stable.      DIMENSION 2:  Biomedical Conditions -- Risk level 0.  Mental health issues that would interfere with treatment.  See physician H&P for full medical history and current medications administered.      DIMENSION 3:  Emotional and Behavioral -- Risk level 2.  The patient is currently hospitalized due to depression.  She denies any suicidal ideation.  The patient reports recent losses and his life stressors that have contributed to return to use and depression.  The patient was seen by Psychiatry.  Please refer to psychiatrist consult notes.      DIMENSION 4:  Readiness to Change -- Risk level 1.  The patient is desiring a sober lifestyle and seeking treatment.      DIMENSION 5:  Relapse and Continued Use Potential -- Risk level 4.  The patient has had previous treatment with some sobriety.  She struggles with relapse and is unable to reestablish sobriety without intervention.  She lacks insight into her relapse triggers and prevention.      DIMENSION 6:  Recovery Environment:  Risk level 4.  The patient is currently single.  She is definably homeless.  She is employed, but not working, and she has minimal sober support.      INITIAL PROBLEM LIST:  She is currently homeless, lacks relapse prevention skills and has poor coping skills.      RECOMMENDATIONS:  The patient is recommended to attend the [facility].         LISA AGEE             D: 2018 17:12   T: 2018 17:48   MT: TS      Name:     KEON LIVINGSTON   MRN:      [id number]        Account:       [id number]   :      1970            Consult Date:  01/03/2018      Document: [id number]

## 2018-01-03 NOTE — PLAN OF CARE
Problem: Depressive Symptoms  Goal: Depressive Symptoms  Signs and symptoms of listed problems will be absent or manageable.   Outcome: Improving  Patient presents with a flat affect and anxious mood. She had a few phone calls today and was tearful during one of them. She attended groups and participated. She has also been social with peers and staff. Pt had a CD assessment done today.

## 2018-01-03 NOTE — PROGRESS NOTES
[facility] Psychiatric Progress Note      Interval History:   Pt seen.  The preceding clinical notes were reviewed and appreciated.  Patient reports she was very disappointed about the fact that she was not allowed to return to [facility] yesterday.  She reports that she cried for an extended period of time on account of her frustration and disappointment.  She is hoping to be accepted to [facility] once the CD assessment is completed following which she feels she'll be appropriate for sober housing.  She states the 90 day raoul is the most difficult for her in terms of her sobriety and she is hoping she can remain in a controlled environment at that point.  She denies experiencing any medication side effects or self-harm thoughts.     Review of systems:   The Review of Systems is negative other than noted in the HPI     Medications:       DULoxetine  60 mg Oral Daily     escitalopram  20 mg Oral Daily     gabapentin  300 mg Oral TID     multivitamin, therapeutic with minerals  1 tablet Oral Daily     traZODone   mg Oral At Bedtime     nicotine   Transdermal Q8H     nicotine   Transdermal Daily     nicotine  1 patch Transdermal Daily     QUEtiapine, hydrOXYzine, acetaminophen    Mental Status Examination:     Appearance:  awake, alert, adequately groomed and casually dressed  Eye Contact:  good  Speech:  Clear and coherent.   Psychomotor Behavior:  no evidence of tardive dyskinesia, dystonia, or tics and fidgeting  Mood: Good  Affect:  No longer labile, now stable, more euthymic with slight blunting  Thought Process:  logical, linear and goal oriented no loose associations  Thought Content:  no evidence of suicidal ideation or homicidal ideation.  She denies self-harm thoughts plans or intent   Oriented to:  time, person, and place  Attention Span and Concentration:  good  Recent and Remote Memory:  good  Fund of Knowledge: appropriate  Muscle Strength and Tone: normal based on visual  inspection   Gait and Station: Normal  Insight:  Improving   Judgment:  Improving           Labs/Vitals:     No results found for this or any previous visit (from the past 24 hour(s)).  B/P: 108/58, T: 97.7, P: 51, R: 16    Impression:   This is a 47-year-old woman with established history of polysubstance abuse who recently underwent chemical detox at [facility] from where she was discharged to [facility] in [place].  She had presented with features of depression and was admitted to the hospital for stabilization.      1/1/18  Feels much better overall. Reports mood is improving. Denies any crying spells over the weekend. She hopes to be accepted back to treatment. Tolerating medications well. She is relying on PRN Seroquel though since admission .  1/2/18  Patient doing well but she could not return to [facility] their management there feels that she needs residential treatment before she comes back to them      DIagnoses:     1.  Major depressive disorder, recurrent, severe, without psychotic features.   2.  Alcohol use disorder.   3.  Sedative/hypnotic use disorder.    4.  Opioid use disorder.   5.  Mild intermittent asthma.            Plan:   1. Written information given on medications. Side effects, risks, benefits reviewed.   2.  Continue individual, milieu and group therapy.    3.  Complete CD assessment.    4.  Continue hospitalization until transferred to residential CD treatment.      Attestation:  Patient has been seen and evaluated by Lottie joel MD    PATIENT ID  Name: Mae Holder  MRN:[id number]  YOB: 1970

## 2018-01-03 NOTE — PROGRESS NOTES
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 without intervention.  She lacks insight into her relapse triggers and prevention.       DIMENSION VI - Recovery Environment     1. Are you employed or attending school?    [employer] (since 2015), discussing with human resources being able to keep my job.      2. If working or a student, are you able to function appropriately in that setting?     No, please explain: have not been working due to hospital stays in past month.    3. Has your job and/or school work been negatively impacted by your use of alcohol of other drugs?  (DSM-5: Sx #5 & Sx #7)    Yes, If yes explain: unable to go to work    4. How would you describe your current finances?  Some money problems     5. Are you having financial problems, such as money being tight, living paycheck to paycheck, having unpaid or late bills, having significant debt, a history of bankruptcy, or IRS problems?    Yes, please explain: not working     6. Describe a typical day; evening for you. Work, school, social, leisure activities, volunteer, exercise, spiritual practices or other daily tasks.    Drink all day.  Last month been in hospital or staying in hotel and with friends that drink.    7. Have you reduced or discontinued recreational activities, hobbies or other leisure  activities as a result of your use of alcohol or other drugs?  (DSM-5: Sx #7)    Yes    8. Who do you live with?      No permanent living    9. Are there any people in the home who have current substance abuse issues or have mental health issues?     NA    10. Tell me about your living environment/neighborhood? Ask enough follow up questions to determine safety, criminal activity, availability of alcohol and drugs, supportive or antagonistic to the person making changes.      No permanent living    11. Are you concerned for your safety or anyone else's safety in the home? No    12. Do you have plans to move somewhere else or change your living environment in any manner?    Yes, If yes explain: need to establish housing    13. Do you have children who live with you?     No    14. Do you have children who do not live with you?     No    15. Do you have any history of being involved with Child Protection Services? NA     16. Are you currently in a significant relationship?     No    17. How do you identify your sexual orientation?    Heterosexual    18. The patient reported: being single, with no serious involvement.    19. Does your significant other have a history of substance abuse or have current substance abuse issues?    NA    20. How important is substance use to your social connections? Do many of your family or friends use?     Friends do drink    21. Who in your life would you consider to be your primary support network at this time?    My best friend has stuck by me all this time, mom and dad are as good as they can be.  Once I get back plugged in AA, my boss has been supportive.    22. Have any of your relationships (S.O., family members, friends, employers, teachers, etc.) been negatively impacted by your use of alcohol or other drugs?  (DSM-5: Sx #6)    My best friend of 20 years does not drink.      23. Do you currently participate in community víctor activities, such as attending Jewish, temple,  Jain or Moravian services?  No    24. Criminal justice history: Gather current/recent history and any significant history related to substance use--Arrests? Convictions? Circumstances? Alcohol or drug involvement? Sentences? Still on probation or parole? Expectations of the court? Current court order?  (DSM-5: Sx #8)    DWI (, , ).  Current pending shoplifting charge, no current probation.    25. Are you or have you ever been a registered sex offender? No    26. Do you have a child protection worker, probation office or ? No    27. Do you have a valid 's license?  Yes    28. What obstacles exist to participating in treatment? (Time off work, childcare, funding, transportation, pending [facility] time, living situation)     May be some financial barriers.    Dimension VI Ratings Summary   Recovery environment - The placing authority must use the criteria in Dimension VI to determine a client s recovery environment.   RISK DESCRIPTIONS - Severity ratin Client has (A) Chronically antagonistic significant other, living environment, family, peer group or long-term criminal justice involvement that is harmful to recovery or treatment progress, or (B) Client has an actively antagonistic significant other, family, work, or living environment with immediate threat to the client s safety and well-being.    REASONS SEVERITY WAS ASSIGNED - (What support does the person have for making changes? What structure/stability does the person have in his or her daily life that will increase the likelihood that changes can be sustained? What problems exist in the person s environment that will jeopardize getting/staying clean and sober?)     Patient is currently single.  She is definably homeless.  She is employed but not working.  She has minimal sober support.       Mental Health Status   Physical Appearance/Attire: Appropriate to situation   Hygiene: well groomed   Eye Contact: at examiner   Speech  Rate:  regular   Speech Volume: regular   Speech Quality: fluid   Cognitive/Perceptual:  reality based   Cognition: memory intact    Judgment: intact   Insight: intact   Orientation:  time, place, person and situation   Thought:   logical    Hallucinations:  none   General Behavioral Tone: cooperative   Psychomotor Activity: no problem noted   Gait:  no problem   Mood: appropriate   Affect: congruence/appropriate   Counselor Notes: NA     Patient Choices/Exceptions     Would you like services specific to language, age, gender, culture, Scientology preference, race, ethnicity, sexual orientation or disability?  No    What particular treatment choices and options would you like to have? residential    Do you have a preference for a particular treatment program? [facility]    Patient is willing to follow treatment recommendations.  Yes    DSM-5 Criteria for Substance Use Disorder   Criteria for Diagnosis  Instructions: Determine whether the client currently meets the criteria for Substance Use Disorder using the diagnostic criteria in the DSM-5 pp.481-588. Current means during the most recent 12 months outside a facility that controls access to substances.    A problematic pattern of alcohol/drug use leading to clinically significant impairment or distress, as manifested by at least two of the following, occurring within a 12-month period:    1. Alcohol/drug is often taken in larger amounts or over a longer period than was intended.  2. There is a persistent desire or unsuccessful efforts to cut down or control alcohol/drug use  3. A great deal of time is spent in activities necessary to obtain alcohol/drug, use alcohol/drug, or recover from its effects.  4. Craving, or a strong desire or urge to use alcohol/drug  5. Recurrent alcohol/drug use resulting in a failure to fulfill major role obligations at work, school or home.  6. Continued alcohol use despite having persistent or recurrent social or  interpersonal problems caused or exacerbated by the effects of alcohol/drug.  7. Important social, occupational, or recreational activities are given up or reduced because of alcohol/drug use.  8. Recurrent alcohol/drug use in situations in which it is physically hazardous.  9. Alcohol/drug use is continued despite knowledge of having a persistent or recurrent physical or psychological problem that is likely to have been caused or exacerbated by alcohol.  10. Tolerance, as defined by either of the following: A need for markedly increased amounts of alcohol/drug to achieve intoxication or desired effect. and A markedly diminished effect with continued use of the same amount of alcohol/drug.  11. Withdrawal, as manifested by either of the following: The characteristic withdrawal syndrome for alcohol/drug (refer to Criteria A and B of the criteria set for alcohol/drug withdrawal). and Alcohol/drug (or a closely related substance, such as a benzodiazepine) is taken to relieve or avoid withdrawal symptoms.          Specify if: In early remission:  After full criteria for alcohol/drug use disorder were previously met, none of the criteria for alcohol/drug use disorder have been met for at least 3 months but for less than 12 months (with the exception that Criterion A4,  Craving or a strong desire or urge to use alcohol/drug  may be met).     In sustained remission:   After full criteria for alcohol use disorder were previously met, non of the criteria for alcohol/drug use disorder have been met at any time during a period of 12 months or longer (with the exception that Criterion A4,  Craving or strong desire or urge to use alcohol/drug  may be met).   Specify if:   This additional specifier is used if the individual is in an environment where access to alcohol is restricted.    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    Collateral Contact      Name: Relationship: Phone number: Releases:   [facility] Medical records       EHR was reviewed and discussed care plan with staff.      Collateral Contact      Name: Relationship: Phone number: Releases:   NA              Recommendations     Patient is recommended to attend the [facility].      Level of Care   I.) Intoxication and Withdrawal: 0   II.) Biomedical:  0   III.) Emotional and Behavioral:  2   IV.) Readiness to Change:  1   V.) Relapse Potential: 4   VI.) Recovery Environmental: 4     Initial Problem List     The patient is currently homeless  The patient lacks relapse prevention skills  The patient has poor coping skills

## 2018-01-03 NOTE — PROGRESS NOTES
Call placed to Gauri Nicholas to ask if patient was on list to be seen today by one of the providers. Message left on voicemail.

## 2018-01-03 NOTE — PLAN OF CARE
Problem: Depressive Symptoms  Goal: Depressive Symptoms  Signs and symptoms of listed problems will be absent or manageable.   Outcome: No Change  Pt began the shift isolating to her room.  Later was visible and social with peers.  Irritable.  Remains frustrated with not being able to go to the [facility] [place] today.  She is also disappointed she will have to do a CD assessment but is motivated to complete tx.   Attended group and participated appropriately.  Attended an AA group on the unit. Appears sad and tense.

## 2018-01-04 PROCEDURE — 97150 GROUP THERAPEUTIC PROCEDURES: CPT | Mod: GO

## 2018-01-04 PROCEDURE — 25000132 ZZH RX MED GY IP 250 OP 250 PS 637: Performed by: PSYCHIATRY & NEUROLOGY

## 2018-01-04 PROCEDURE — 12400006 ZZH R&B MH INTERMEDIATE

## 2018-01-04 PROCEDURE — 90853 GROUP PSYCHOTHERAPY: CPT

## 2018-01-04 RX ORDER — DULOXETIN HYDROCHLORIDE 60 MG/1
60 CAPSULE, DELAYED RELEASE ORAL DAILY
Qty: 30 CAPSULE | Refills: 0 | Status: SHIPPED | OUTPATIENT
Start: 2018-01-05 | End: 2018-01-31

## 2018-01-04 RX ORDER — QUETIAPINE FUMARATE 25 MG/1
25-50 TABLET, FILM COATED ORAL EVERY 8 HOURS PRN
Qty: 90 TABLET | Refills: 0 | Status: SHIPPED | OUTPATIENT
Start: 2018-01-04 | End: 2018-01-09

## 2018-01-04 RX ADMIN — GABAPENTIN 300 MG: 300 CAPSULE ORAL at 08:16

## 2018-01-04 RX ADMIN — QUETIAPINE FUMARATE 50 MG: 25 TABLET ORAL at 14:23

## 2018-01-04 RX ADMIN — DULOXETINE HYDROCHLORIDE 60 MG: 60 CAPSULE, DELAYED RELEASE ORAL at 08:16

## 2018-01-04 RX ADMIN — TRAZODONE HYDROCHLORIDE 150 MG: 50 TABLET ORAL at 21:33

## 2018-01-04 RX ADMIN — MULTIPLE VITAMINS W/ MINERALS TAB 1 TABLET: TAB at 08:16

## 2018-01-04 RX ADMIN — ESCITALOPRAM 20 MG: 20 TABLET, FILM COATED ORAL at 08:16

## 2018-01-04 RX ADMIN — GABAPENTIN 300 MG: 300 CAPSULE ORAL at 21:33

## 2018-01-04 RX ADMIN — GABAPENTIN 300 MG: 300 CAPSULE ORAL at 15:56

## 2018-01-04 RX ADMIN — QUETIAPINE FUMARATE 50 MG: 25 TABLET ORAL at 09:53

## 2018-01-04 RX ADMIN — NICOTINE 1 PATCH: 21 PATCH, EXTENDED RELEASE TRANSDERMAL at 08:16

## 2018-01-04 NOTE — PROGRESS NOTES
[facility] Psychiatric Progress Note      Interval History:   Pt seen.  The preceding clinical notes were reviewed and appreciated.  Staff report that patient has been doing very well and displayed more hope about her treatment. She completed her CD assessment yesterday and the recommendation was for her to pursue residential treatment at [facility].  Patient denies experiencing any self-harm thoughts plans or intent.  She states she feels very stable at this time.  [facility] was [facility] and the patient's case reportedly is being medically reviewed before she'll be placed on their wait list.  No additional orders to be putting from this end.  She denies experiencing any self-harm thoughts plans or intent.     Review of systems:   The Review of Systems is negative other than noted in the HPI     Medications:       DULoxetine  60 mg Oral Daily     escitalopram  20 mg Oral Daily     gabapentin  300 mg Oral TID     multivitamin, therapeutic with minerals  1 tablet Oral Daily     traZODone   mg Oral At Bedtime     nicotine   Transdermal Q8H     nicotine   Transdermal Daily     nicotine  1 patch Transdermal Daily     QUEtiapine, hydrOXYzine, acetaminophen    Mental Status Examination:     Appearance:  awake, alert, adequately groomed and casually dressed  Eye Contact:  good  Speech:  Clear and coherent.   Psychomotor Behavior:  no evidence of tardive dyskinesia, dystonia, or tics and fidgeting  Mood: Good  Affect:  No longer labile, now stable, more euthymic with slight blunting  Thought Process:  logical, linear and goal oriented no loose associations  Thought Content:  no evidence of suicidal ideation or homicidal ideation.  She denies self-harm thoughts plans or intent   Oriented to:  time, person, and place  Attention Span and Concentration:  good  Recent and Remote Memory:  good  Fund of Knowledge: appropriate  Muscle Strength and Tone: normal based on visual inspection   Gait and  Station: Normal  Insight:  Improving   Judgment:  Improving           Labs/Vitals:     No results found for this or any previous visit (from the past 24 hour(s)).  B/P: 108/58, T: 97.7, P: 51, R: 16    Impression:   This is a 47-year-old woman with established history of polysubstance abuse who recently underwent chemical detox at [facility] from where she was discharged to [facility] in [place].  She had presented with features of depression and was admitted to the hospital for stabilization.      1/1/18  Feels much better overall. Reports mood is improving. Denies any crying spells over the weekend. She hopes to be accepted back to treatment. Tolerating medications well. She is relying on PRN Seroquel though since admission .  1/2/18  Patient doing well but she could not return to [facility] their management there feels that she needs residential treatment before she comes back to them  1/3/18  Patient completed CD assessment.  Tolerating medications without any side effects.  Mood has stabilized.    DIagnoses:     1.  Major depressive disorder, recurrent, severe, without psychotic features.   2.  Alcohol use disorder.   3.  Sedative/hypnotic use disorder.    4.  Opioid use disorder.   5.  Mild intermittent asthma.            Plan:   1. Written information given on medications. Side effects, risks, benefits reviewed.   2.  Continue individual, milieu and group therapy.    3.  Continue hospitalization until transferred to residential CD treatment.      Attestation:  Patient has been seen and evaluated by Lottie joel MD    PATIENT ID  Name: Mae Holder  MRN:[id number]  YOB: 1970

## 2018-01-04 NOTE — PLAN OF CARE
Problem: General Plan of Care (Inpatient Behavioral)  Goal: Discharge Planning  Nurse to nurse consult completed for [facility]. Patient was accepted. Call came later to R.N. On unit that patient is expected at [facility] at 11:30 am tomorrow with 30 day supply of medications. Patient was pleased to hear of tomorrow's discharge to the [facility].

## 2018-01-04 NOTE — PLAN OF CARE
Problem: Depressive Symptoms  Goal: Depressive Symptoms  Signs and symptoms of listed problems will be absent or manageable.   Outcome: Improving  Pt was anxious, but cooperative going to all groups.  Pt is set to d/c tomorrow 1/5 at 11:30 to [facility].  Pt's close friend brought in 2 suitcases, but these were sent back with the friend.  Although pt was told she will d/c soon, pt continued to sob and complain throughout the day to peers about having to be here over the long weekend, and was met with much sympathy from other pts.

## 2018-01-04 NOTE — PROGRESS NOTES
Had one to one conversation with nurse Nicki of [facility].   Mae was informed, she was accepted .

## 2018-01-04 NOTE — PLAN OF CARE
Problem: General Plan of Care (Inpatient Behavioral)  Goal: Discharge Planning  Patient attended Process Group on 1/2 and 1/3/18. Participation complete and appropriate. Patient was reminded on 1/2 not to give direct advice to other patients in the group.

## 2018-01-04 NOTE — PLAN OF CARE
"Problem: Depressive Symptoms  Goal: Depressive Symptoms  Signs and symptoms of listed problems will be absent or manageable.   Outcome: Improving  Patient presents with full range affect, calm mood. Spent most of shift chatting in lounge or relaxing in bed. Met with CD  today. Hoping to be accepted to [facility], and then return to [facility] (treatment attended prior to admission). Anxious to get back to treatment. Attended no groups, said she was \"not interested.\" Med-compliant.      "

## 2018-01-04 NOTE — PLAN OF CARE
Problem: General Rehab Plan of Care  Goal: Occupational Therapy Goals  The patient and/or their representative will achieve their patient-specific goals related to the plan of care.  The patient-specific goals include:  Pt will explore and practice coping skills to reduce and manage stressors in daily life.    Pt participated in 1/3 OT groups today. Affect was bright. Pt was alert and attentive throughout the sixty minute group. Pt contributed appropriately to the group discussion. She applied the concepts discussed to her own life easily. Pt expressed excitement at her plan to discharge tomorrow.

## 2018-01-04 NOTE — DISCHARGE INSTRUCTIONS
Behavioral Discharge Planning and Instructions    Summary:  Admitted for worsening depression and suicidal ideation.     Main Diagnosis:   Major Depressive Disorder, recurrent, severe, without psychotic features; Alcohol Use Disorder; Sedative Hypnotic Use Disorder; Opioid Use Disorder.     Major Treatments, Procedures and Findings:  Psychiatric assessment. Medication adjustment.     Symptoms to Report: Losing more sleep, Mood getting worse or Thoughts of suicide    Lifestyle Adjustment:  Follow all treatment recommendations. Develop and follow safety plan. Utilize positive coping skills to manage symptoms of depression.  Abstain from the use of all mood-altering substances, including alcohol, as usage may increase symptoms of depression. Maintain sobriety by attending daily AA or NA meetings and obtaining a sponsor.     Psychiatry Follow-up:     On 1/5/17, patient to enter:  [facility].  [place] [address] (next to [facility])  [place] [address]  [phone]    [facility] will [facility] you back once you complete [facility].  [address] [place] [address]  [phone]    Dr. Cedeno, psychiatry appointment on Wednesday 3/14/18 @ 2;40 PM.  Call when refills are needed.  [facility] [address], [address]  [place]  [address]  Phone:  [phone]  Fax:  [phone]    Resources:   Crisis Intervention: 639.328.8299 or 056-914-5298 (TTY: 538.435.1027).  Call anytime for help.  [employer] on Mental Illness (www.[place].simon.org): 638.884.1501 or 450-177-5034.  National Suicide Prevention Line (www.mentalhealthmn.org): 255-958-SGFU (2230)  COPE (Crisis Services for Adults) in [facility]: 399.695.9107 (Available 24/7)  [facility] 24/7 crisis hotline for adults who are in the midst of a mental health or substance abuse crisis - 811.599.2035    General Medication Instructions:    See your medication sheet(s) for instructions.   Take all medicines as directed.  Make no changes unless your doctor suggests them.   Go to all your doctor visits.  Be sure to have all your required lab tests. This way, your medicines can be refilled on time.  Do not use any drugs not prescribed by your doctor.  Avoid alcohol.

## 2018-01-04 NOTE — PROGRESS NOTES
[facility] representative May, called to inform that Mae should be at [facility] tomorrow by 11:30 [address] next to [employer] , first floor. They would like her with her medication supply for 30 days.  Dr Cedeno was informed, and he will  take care of the medications.    Mae was informed about the phone and she is happy she is leaving tomorrow.    Joy Olvera was present in room .

## 2018-01-04 NOTE — PLAN OF CARE
Problem: General Plan of Care (Inpatient Behavioral)  Goal: Discharge Planning  Patient attended Process Group today and participated appropriately. Patient exhibited better boundaries than in previous groups.  Patient demonstrated good insight into the topic of discussion as well as her own situation. She was able to reflect that staying in the hospital in order to stabilize and get her symptoms of depression under control was the right decision for her to make.

## 2018-01-05 ENCOUNTER — RECORDS - HEALTHEAST (OUTPATIENT)
Dept: ADMINISTRATIVE | Facility: OTHER | Age: 48
End: 2018-01-05

## 2018-01-05 ENCOUNTER — HOSPITAL ENCOUNTER (OUTPATIENT)
Dept: BEHAVIORAL HEALTH | Facility: CLINIC | Age: 48
End: 2018-01-05
Attending: FAMILY MEDICINE
Payer: COMMERCIAL

## 2018-01-05 VITALS
SYSTOLIC BLOOD PRESSURE: 108 MMHG | BODY MASS INDEX: 22.55 KG/M2 | OXYGEN SATURATION: 100 % | WEIGHT: 132.1 LBS | TEMPERATURE: 98.3 F | HEIGHT: 64 IN | HEART RATE: 81 BPM | RESPIRATION RATE: 15 BRPM | DIASTOLIC BLOOD PRESSURE: 64 MMHG

## 2018-01-05 VITALS — DIASTOLIC BLOOD PRESSURE: 82 MMHG | SYSTOLIC BLOOD PRESSURE: 112 MMHG | HEART RATE: 83 BPM

## 2018-01-05 PROBLEM — F19.20 CHEMICAL DEPENDENCY (H): Status: ACTIVE | Noted: 2018-01-05

## 2018-01-05 PROCEDURE — 10020000 ZZH LODGING PLUS FACILITY CHARGE ADULT

## 2018-01-05 PROCEDURE — 25000132 ZZH RX MED GY IP 250 OP 250 PS 637: Performed by: PSYCHIATRY & NEUROLOGY

## 2018-01-05 PROCEDURE — H2035 A/D TX PROGRAM, PER HOUR: HCPCS | Mod: HQ

## 2018-01-05 RX ORDER — LORATADINE 10 MG/1
10 TABLET ORAL DAILY PRN
COMMUNITY
End: 2018-01-28

## 2018-01-05 RX ORDER — MAGNESIUM HYDROXIDE/ALUMINUM HYDROXICE/SIMETHICONE 120; 1200; 1200 MG/30ML; MG/30ML; MG/30ML
30 SUSPENSION ORAL EVERY 6 HOURS PRN
COMMUNITY
End: 2018-01-28

## 2018-01-05 RX ORDER — FLUTICASONE PROPIONATE 220 UG/1
1 AEROSOL, METERED RESPIRATORY (INHALATION) 2 TIMES DAILY
COMMUNITY

## 2018-01-05 RX ORDER — AMOXICILLIN 250 MG
2 CAPSULE ORAL DAILY PRN
COMMUNITY
End: 2018-01-28

## 2018-01-05 RX ORDER — ALBUTEROL SULFATE 90 UG/1
1 AEROSOL, METERED RESPIRATORY (INHALATION) EVERY 4 HOURS PRN
COMMUNITY

## 2018-01-05 RX ADMIN — NICOTINE 1 PATCH: 21 PATCH, EXTENDED RELEASE TRANSDERMAL at 07:34

## 2018-01-05 RX ADMIN — MULTIPLE VITAMINS W/ MINERALS TAB 1 TABLET: TAB at 07:34

## 2018-01-05 RX ADMIN — DULOXETINE HYDROCHLORIDE 60 MG: 60 CAPSULE, DELAYED RELEASE ORAL at 07:34

## 2018-01-05 RX ADMIN — QUETIAPINE FUMARATE 50 MG: 25 TABLET ORAL at 09:05

## 2018-01-05 RX ADMIN — ESCITALOPRAM 20 MG: 20 TABLET, FILM COATED ORAL at 07:34

## 2018-01-05 RX ADMIN — GABAPENTIN 300 MG: 300 CAPSULE ORAL at 07:34

## 2018-01-05 ASSESSMENT — ANXIETY QUESTIONNAIRES
2. NOT BEING ABLE TO STOP OR CONTROL WORRYING: NEARLY EVERY DAY
4. TROUBLE RELAXING: NEARLY EVERY DAY
6. BECOMING EASILY ANNOYED OR IRRITABLE: NOT AT ALL
5. BEING SO RESTLESS THAT IT IS HARD TO SIT STILL: MORE THAN HALF THE DAYS
7. FEELING AFRAID AS IF SOMETHING AWFUL MIGHT HAPPEN: MORE THAN HALF THE DAYS
3. WORRYING TOO MUCH ABOUT DIFFERENT THINGS: NEARLY EVERY DAY
1. FEELING NERVOUS, ANXIOUS, OR ON EDGE: MORE THAN HALF THE DAYS
GAD7 TOTAL SCORE: 15

## 2018-01-05 ASSESSMENT — ACTIVITIES OF DAILY LIVING (ADL): GROOMING: INDEPENDENT

## 2018-01-05 ASSESSMENT — PATIENT HEALTH QUESTIONNAIRE - PHQ9: SUM OF ALL RESPONSES TO PHQ QUESTIONS 1-9: 11

## 2018-01-05 NOTE — PROGRESS NOTES
"  Initial Services Plan        Before your first treatment group, please do the following    Immediate health & safety concerns: Obtain personal items (glasses, hearing aides, medicines, diabetes supplies, clothing, etc.).  Look for a support network (such as AA, NA, DBT group, a Rastafari group, etc.)    Suggestions for client during the time between intake & completion of treatment plan:  Tour your treatment center (unit or outpatient clinic).  Introduce yourself to the treatment group.  Spend time getting to know your peers.  Complete the problem list for your treatment plan.  Start drug and alcohol use history.  Review your patient or client handbook.  Identify concerns about whom to ask for family week    Client issues to be addressed in the first treatment sessions:  Identify concerns about coming into treatment, i.e. fear of failing again, sharing a room in treatment  Other She would like to work on her \"co-dependency\" issues with an out of state BF and work on self-esteem issues.      Shaun Ordaz [facility]  1/5/2018  11:45 AM                  "

## 2018-01-05 NOTE — PROGRESS NOTES
[facility] ADMISSION UPDATE       Date of update: Update Evaluation Counselor:   1/5/2018     Shaun Ordaz [facility]     Date of original CD evaluation: Original Evaluation Counselor:   1/3/2017     Yani Nicholas [facility]      PHQ Score 10 or greater:   Yes, If yes explain: she scored an 11.       WILFRIDO-7 Score 10 or greater:   Yes, If yes explain: she scored an 15.         Suicide Screening Questions:   1. Are you feeling hopeless about the present/future?    Yes, If yes explain: due to drinking   2. Have you ever had thoughts about taking your life?    No   3. When did you have these thoughts?    NA   4. Do you have any current intent or active desire to take your life?    No   5. Do you have a plan to take your life?    No   6. Have you ever made a suicide attempt?    No   7. Do you have access to pills, guns or other methods to kill yourself?    No             Guide to Risk Ratings   IDEATION: Active thoughts of suicide? INTENT: Intent to follow on suicide? PLAN: Plan to follow through on suicide? Level of Risk:   IF Yes Yes Yes Patient = High Emergent   IF Yes Yes No Patient = High Urgent/Non-Emergent   IF Yes No No Patient = Moderate Non-Urgent   IF No No No Patient = Low Risk   The patient's ADDITIONAL RISK FACTORS and lack of PROTECTIVE FACTORS may increase their overall suicide risk ratings.            Patient's Responses (within the last 30 days)   IDEATION: Active thoughts of suicide?     No    INTENT: Intent to follow on suicide?     No    PLAN: Plan to follow through on suicide?     No    Determining the level of risk depends on the patient responses, suicide risk factors and protective factors.      Additional Risk Factors: Tendency to be socially isolated and/or cut off from the support of others  A recent loss that was significant to the patient, i.e. loss of job, loss of home, divorce, break-up, etc.  Substance use   Protective Factors:  Having easy access to supportive family members         "  Risk Status   Emergent? No   Urgent / Non-Emergent? No   Present / Non- Urgent? No    Low Risk? Yes, Evaluation Counselors - Document in Epic / SBAR to counselor \"No identified risk\" and Treatment Counselors - Assess weekly in progress notes under Dimension 3 and summarize in Discharge / Treatment summary under Dimension 3.   Additional information to support suicide risk rating: See Above         Current GIGI: Current UA:     .000       Positive for barbiturates and negative for all other screened drugs.  She received phenobarbital while in IP detox around 2 weeks ago.          Alcohol/Drug use since the last CD evaluation (include date and time of last use):     No additional substances use since the last CD evaluation       Please note any other clinical changes since the last CD evaluation (such as medication changes, additional legal charges, detoxification admissions, overdoses, etc.)     No significant changes since the last CD evaluation         Current ASAM Dimensions   I.) Intoxication and Withdrawal: 0   II.) Biomedical:  0   III.) Emotional and Behavioral:  2   IV.) Readiness to Change:  1   V.) Relapse Potential: 4   VI.) Recovery Environmental: 4         "

## 2018-01-05 NOTE — PROGRESS NOTES
[facility] Nursing Health Assessment      Vital signs:     /82  Pulse 83      Direct admission    Counselor: Marino Mckeon  Drug of Choice: Alcohol  Last use: 12/14/2017  Home clinic/MD: [facility]  Psychiatrist/therapist: None    Medical history/current conditions: Asthma    H&P Screen:  H&P within the last 90 days: Yes.  Date: 12/15/2017 Location: [facility]      Mental Health diagnosis: MDD, WILFRIDO, ADHD, PTSD  Medication compliant?: Yes  Recent sucidal thoughts? None     When? N/A  Current thought of self-harm? None    Plan? N/A    Pain assessment:   Pt. Experiencing pain at this time?  No      Nursing Assessment Summary:  No concerns reported or observed at admission     On-going nursing intervention required?   No    Acute care visit recommended: no

## 2018-01-05 NOTE — PROGRESS NOTES
Name: Mae Holder  Date: 1/5/2018  Medical Record: [id number]    Envelope Number: [id number]    List of Contents (List each item separately in new row):   1 cellphone with 2 chargers (white)    Admission:  I am responsible for any personal items that are not sent to the safe or pharmacy.  [facility] is not responsible for loss, theft or damage of any property in my possession.      Patient Signature:  ___________________________________________       Date/Time:__________________________    Staff Signature: __________________________________       Date/Time:__________________________    2nd Staff person, if patient is unable/unwilling to sign:      __________________________________________________________       Date/Time: __________________________      Discharge:  [facility] has returned all of my personal belongings:    Patient Signature: ________________________________________     Date/Time: ____________________________________    Staff Signature: ______________________________________     Date/Time:_____________________________________

## 2018-01-05 NOTE — PROGRESS NOTES
Name: Mae Holder  Date: 1/5/2018  Medical Record: [id number]    Envelope Number: [id number]    List of Contents (List each item separately in new row):   Venlafaxine ER 150mg - 1bottle  Venlafaxine ER 75mg - 1bottle  Venlafaxine HCl ER 37.5mg -1bottle    Admission:  I am responsible for any personal items that are not sent to the safe or pharmacy.  [facility] is not responsible for loss, theft or damage of any property in my possession.      Patient Signature:  ___________________________________________       Date/Time:__________________________    Staff Signature: __________________________________       Date/Time:__________________________    2nd Staff person, if patient is unable/unwilling to sign:      __________________________________________________________       Date/Time: __________________________      Discharge:  [facility] has returned all of my personal belongings:    Patient Signature: ________________________________________     Date/Time: ____________________________________    Staff Signature: ______________________________________     Date/Time:_____________________________________

## 2018-01-05 NOTE — PLAN OF CARE
Problem: Depressive Symptoms  Goal: Depressive Symptoms  Signs and symptoms of listed problems will be absent or manageable.   Pt. Stable in mood. Demonstrating improved insight. Excited about transition to [facility] tomorrow. Active in milieu tonight. Packed belongings and organized self for treatment tomorrow.

## 2018-01-05 NOTE — PROGRESS NOTES
Name: Mae Holder  Date: 1/5/2018  Medical Record: [id number]    Envelope Number: [id number]    List of Contents (List each item separately in new row):   1 cellphone with 2 chargers    Admission:  I am responsible for any personal items that are not sent to the safe or pharmacy.  [facility] is not responsible for loss, theft or damage of any property in my possession.      Patient Signature:  ___________________________________________       Date/Time:__________________________    Staff Signature: __________________________________       Date/Time:__________________________    2nd Staff person, if patient is unable/unwilling to sign:      __________________________________________________________       Date/Time: __________________________      Discharge:  [facility] has returned all of my personal belongings:    Patient Signature: ________________________________________     Date/Time: ____________________________________    Staff Signature: ______________________________________     Date/Time:_____________________________________

## 2018-01-05 NOTE — DISCHARGE SUMMARY
"DATE OF ADMISSION:  12/26/2017      DATE OF DISCHARGE:  01/05/2018      DISCHARGE DIAGNOSES:   1.  Major depressive disorder, recurrent, severe, without psychotic features.   2.  Alcohol use disorder.   3.  Sedative hypnotic use disorder.   4.  Opioid use disorder.   5.  Tobacco use disorder.   6.  Mild intermittent asthma.      REASON FOR REFERRAL:  Mae Holder is a 47-year-old woman who reports she got  in 2012 following 7 years of marriage.  She states she has no children and worked at the [employer] in [place].  She holds a bachelor's degree in applied psychology and social work but has never worked with her qualifications.  She was referred from [facility] for psychiatric stabilization on account of worsening depression and suicidal ideations.  She was admitted as a voluntary patient.  She had just completed detoxification at the [facility] [place], prior to admission to [facility].      CHIEF COMPLAINT:  \"I have been crying nonstop for several days and I just feel hopeless.\"      HISTORY OF PRESENT ILLNESS:  I refer the reader to the psychiatric evaluation documented by Lottie Cedeno MD on 12/27/2017 in addition to the case management, social history completed by Joy Rojas on 12/27/2017 and chemical dependency assessment completed by LISA Sam on 01/03/2018.      HOSPITAL COURSE:  Following admission to the mental health unit, the patient was admitted to the step-down unit.  She was introduced to the milieu and encouraged to participate in individual, milieu, and group therapy.  She was given the opportunity to ventilate her stressors and she was given a tour of the unit.  The patient indicated that she had been feeling increasingly depressed on account of a recent breakup with her boyfriend of about 7 years.  She had also relapsed on alcohol in late October to the extent that she sought help at [facility] for " detoxification.  While she was at [facility], she had spells of tearfulness and she could not participate in the program.  The supervising providers at that facility thought she could not participate in the program with that level of depression; hence her referral to the hospital.  While in the hospital, the patient was very emotionally labile and displayed poor boundaries.  She was maintained on her prior to admission medications but Cymbalta was added to her regimen.  She felt that her tearfulness was a function of her recently detoxified opioids and abrupt discontinuation of Suboxone prior to her discharge from [facility].  She was very eager to return to [facility] and initially did not want to remain on admission; however, her contacts with [facility] suggested that she had to remain in the hospital until she was psychiatrically cleared.  In the course of her hospitalization, she participated fairly in individual, milieu, and group therapy and afforded herself the opportunity to ventilate some of her stressors.  She slowly began to show improvement in her mood and functioning by Tuesday 01/02/2018.  She learned that [facility] would not accept her back until she completed a CD program.  She was very disappointed and tearful but glad that she had commercial insurance that will facilitate her chemical use assessment.  She underwent chemical use assessment on 01/03/2018 and the recommendation was for her to attend the [facility].  Her referral was facilitated and she was accepted to the program on 01/04/2018 with an intake date of 01/05/2018.  During her stay in the hospital, her labs remained within normal limits and she complied with medications as prescribed.  She was discharged uneventfully on 01/05/2018.      DISCHARGE MEDICATIONS:   1.  Cymbalta 60 mg daily.   2.  Seroquel 25 mg 1-2 p.o. q.8 hours p.r.n.   3.  Atarax 25 mg 1-2 q.4 hours p.r.n.   4.   Gabapentin 300 mg t.i.d.   5.  Lexapro 20 mg p.o. daily.   6.  Trazodone 50 mg 1-3 each day at bedtime p.r.n.   7.  Multivitamin 1 p.o. daily.      DISPOSITION:  The patient was discharged to [facility] treatment program at [facility] with the plan for her to return to [facility] in [place] following completion of her CD treatment program.  She was given a followup appointment with Dr. Whittaker for medication management on 2018 at 2:40 p.m. at [facility] in [place].      TIME SPENT:  35 minutes with more than 50% of the time spent in counseling and care coordination.         SAUL WHITTAKER MD             D: 2018 08:45   T: 2018 09:56   MT: KARIS      Name:     KEON LIVINGSTON   MRN:      [id number]        Account:        [id number]   :      1970           Admit Date:     [id number]                                  Discharge Date:       Document: [id number]

## 2018-01-05 NOTE — PLAN OF CARE
"Problem: General Plan of Care (Inpatient Behavioral)  Goal: Individualization/Patient Specific Goal (IP Behavioral)  The patient and/or their representative will achieve their patient-specific goals related to the plan of care.    The patient-specific goals include:    1. Absence of suicidal ideation with safety plan in place.  2. Effective medication regime with patient compliance.  3. Stabilization of mood and thought processes.  4. Improved insight into mental health issues.  5. Able to identify and utilize positive coping skills.  6. Plan in place to address substance abuse issues.  7. Plan in place for ongoing treatment and support.       Outcome: Improving  Slightly anxious this morning, eager to attend [facility].  \"  This is a new chance for life, I am really excited.\"  She denies any suicidal ideation, pleasant, socialized with peers, follows rules and complaint with medications.  Reviewed discharge instructions and education provided.   Patient will discharge to [facility] this morning.       "

## 2018-01-05 NOTE — PROGRESS NOTES
Name: Mae Holder  Date: 2018  Medical Record: [id number]    Envelope Number: [id number]    List of Contents (List each item separately in new row):   Amoxicillin 500mg caps [id number]  Clindamycin 150mg caps  Hydroxyzine pamoate 50mg caps  Ventolin inhaler  Trazodone 150mg tabs  Trazodone 100mg tabs  Trazodone 100mg tabs ( 2017)  Ondansetron 4mg tabs [id number]  Vitamin B-1 (opened) 1bottle  Escitalopram 10mg tabs    Admission:  I am responsible for any personal items that are not sent to the safe or pharmacy.  [facility] is not responsible for loss, theft or damage of any property in my possession.      Patient Signature:  ___________________________________________       Date/Time:__________________________    Staff Signature: __________________________________       Date/Time:__________________________    [facility] Staff person, if patient is unable/unwilling to sign:      __________________________________________________________       Date/Time: __________________________      Discharge:  [facility] has returned all of my personal belongings:    Patient Signature: ________________________________________     Date/Time: ____________________________________    Staff Signature: ______________________________________     Date/Time:_____________________________________
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
## 2018-01-06 ENCOUNTER — HOSPITAL ENCOUNTER (OUTPATIENT)
Dept: BEHAVIORAL HEALTH | Facility: CLINIC | Age: 48
End: 2018-01-06
Attending: FAMILY MEDICINE
Payer: COMMERCIAL

## 2018-01-06 PROCEDURE — H2035 A/D TX PROGRAM, PER HOUR: HCPCS | Mod: HQ

## 2018-01-06 PROCEDURE — 10020000 ZZH LODGING PLUS FACILITY CHARGE ADULT

## 2018-01-06 ASSESSMENT — ANXIETY QUESTIONNAIRES: GAD7 TOTAL SCORE: 15

## 2018-01-07 ENCOUNTER — HOSPITAL ENCOUNTER (OUTPATIENT)
Dept: BEHAVIORAL HEALTH | Facility: CLINIC | Age: 48
End: 2018-01-07
Attending: FAMILY MEDICINE
Payer: COMMERCIAL

## 2018-01-07 PROCEDURE — 10020000 ZZH LODGING PLUS FACILITY CHARGE ADULT

## 2018-01-07 PROCEDURE — H2035 A/D TX PROGRAM, PER HOUR: HCPCS | Mod: HQ

## 2018-01-08 ENCOUNTER — HOSPITAL ENCOUNTER (OUTPATIENT)
Dept: BEHAVIORAL HEALTH | Facility: CLINIC | Age: 48
End: 2018-01-08
Attending: FAMILY MEDICINE
Payer: COMMERCIAL

## 2018-01-08 PROCEDURE — H2035 A/D TX PROGRAM, PER HOUR: HCPCS

## 2018-01-08 PROCEDURE — 10020000 ZZH LODGING PLUS FACILITY CHARGE ADULT

## 2018-01-08 PROCEDURE — H2035 A/D TX PROGRAM, PER HOUR: HCPCS | Mod: HQ

## 2018-01-08 NOTE — PROGRESS NOTES
Patient Safety Plan Template    Name:   Mae Holder YOB: 1970 Age:  47 year old MR Number:  [id number]   Step 1: Warning signs (Thoughts, images, mood, situation, behavior) that a crisis may be developin. Negative attitude & emotions     2. Anger, Jealousy, Rage     3. Restless, Boredom     Step 2: Internal coping strategies - Things I can do to take my mind off of my problems without contacting another person (relaxation technique, physical activity):     1. Go for a walk     2. Read something from thew Big Book or a meditation book     3. Do something nice for someone else     Step 3: People and social settings that provide distraction:     1. Name: [employer]   Phone: [phone]   2. Name:  Latha    Phone: [phone]   3. Place: AA meeting   4. Place: Coffee shop     The one thing that is most important to me and worth living for is: my family     Step 4: People whom I can ask for help:     1. Name: [employer]   Phone: [phone]     2. Name: Latha   Phone: [phone]     3. Name:  Holly   Phone: none provided     Step 5: Professionals or agencies I can contact during a crisis:     1. Clinician Name: Dr Barone [facility] Phone: none provided   Clinician Pager or Emergency Contact #: none provided     2. Clinician Name: none provided   Phone: none provided     Clinician Pager or Emergency Contact #: none provideed     3. Local Urgent Care Services: none provided    Urgent Care Services Address: none provided    Urgent Care Services Phone: none provided     4. Suicide Prevention Lifeline Phone: 2-925-198-SSBE (1967)     Step 6: Making the environment safe:     1.redirecting from boyfriend     2. Seeking sober residency     Safety Plan Template 2008 Ella Miller and Mehdi Wade is reprinted with the express permission of the authors.  No portion of the Safety Plan Template may be reproduced without the express, written permission.  You can contact the authors at [email or web address] or  [email or web address].
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
## 2018-01-09 ENCOUNTER — OFFICE VISIT (OUTPATIENT)
Dept: BEHAVIORAL HEALTH | Facility: CLINIC | Age: 48
End: 2018-01-09
Payer: COMMERCIAL

## 2018-01-09 ENCOUNTER — HOSPITAL ENCOUNTER (OUTPATIENT)
Dept: BEHAVIORAL HEALTH | Facility: CLINIC | Age: 48
End: 2018-01-09
Attending: FAMILY MEDICINE
Payer: COMMERCIAL

## 2018-01-09 VITALS
BODY MASS INDEX: 23.17 KG/M2 | WEIGHT: 135 LBS | SYSTOLIC BLOOD PRESSURE: 98 MMHG | HEART RATE: 78 BPM | DIASTOLIC BLOOD PRESSURE: 60 MMHG | TEMPERATURE: 97.7 F | RESPIRATION RATE: 16 BRPM | OXYGEN SATURATION: 99 %

## 2018-01-09 DIAGNOSIS — F41.1 GAD (GENERALIZED ANXIETY DISORDER): Primary | ICD-10-CM

## 2018-01-09 DIAGNOSIS — F32.A DEPRESSION WITH SUICIDAL IDEATION: ICD-10-CM

## 2018-01-09 DIAGNOSIS — R45.851 DEPRESSION WITH SUICIDAL IDEATION: ICD-10-CM

## 2018-01-09 PROCEDURE — 99214 OFFICE O/P EST MOD 30 MIN: CPT | Performed by: NURSE PRACTITIONER

## 2018-01-09 PROCEDURE — 10020000 ZZH LODGING PLUS FACILITY CHARGE ADULT

## 2018-01-09 PROCEDURE — H2035 A/D TX PROGRAM, PER HOUR: HCPCS | Mod: HQ

## 2018-01-09 RX ORDER — QUETIAPINE FUMARATE 25 MG/1
50 TABLET, FILM COATED ORAL 3 TIMES DAILY
Qty: 90 TABLET | Refills: 1 | Status: SHIPPED | OUTPATIENT
Start: 2018-01-09

## 2018-01-09 NOTE — PROGRESS NOTES
Pt had order for  Seroquel 25 mg tabs.  Take 1-2 tabs every 8 hours as needed for anxiety  This pt was allowed to self-administer a second dose (she took 2 tabs - 50 mg at both 1212 and 1646) of Seroquel within 4 1/2 hour period with med room staff observing on 1/7/2018  LP Manager and Medical Director notified as well as pt.  Med room staff reminded to follow 5 rights as observing pt self-administration.  Pt reminded of med orders as well   Protocol followed

## 2018-01-09 NOTE — PROGRESS NOTES
Acknowledgement of Current Treatment Plan - Initial Treatment Plan     INITIAL TREATMENT PLAN:     1. I have participated in creating my treatment plan with my therapist / counselor on 1/8/18.  I agree with the plan as it is written in the electronic health record.    Name Signature/Date   Maeclaudette Holder    Name of Therapist / Counselor Signature/Date    Marino GONZALEZ      2. I have completed and reviewed my Safety Plan with my counselor on 1/8/18 and signed this on 1/9/18. I have been given the hard copy of this plan.    Patient signature/date:      ________________________________________________________________    3. Last Use Date: __________    Patient signature/date:     ________________________________________________________________                                                    Acknowledgement of Current Treatment Plan - Additional Entries       ADDITIONAL GOALS AND INTERVENTIONS:    Signatures/dates required for any additional Problems, Goals, and/or Interventions added to treatment plan:  Change/Addition in Dimension ____ on date:_________  Insert here:         I have participated in creating this change and/or addition to my treatment plan copied above.   I have been given a copy of this signature page with change/addition to my treatment plan and I am in agreement with how it is written in the electronic record.       Patient signature:   ________________________________________________________________________    I have been given a copy of the addition to my treatment plan in Dimension _____ on _________ and I agree with this as it is written in the electronic record.      Patient signature:   ________________________________________________________________      Last use date if changed: ________________________________________________________________

## 2018-01-09 NOTE — PROGRESS NOTES
SUBJECTIVE:                                                    Mae Holder is a 47 year old female who presents to clinic today for the following health issues: Patient currently in the LP program, drug of choice alcohol    Patient would like to discuss Seroquel dose.  In the psych bruce she was receiving it every 4 hours, however her prescription states to take every 8 hours.    Mental Health Follow up     Status since last visit: recent hospital stay, discharged on 1/5/2018, was stable on seroquel every 4 hours, but now 2x daily and having anxiety attacks, medication was changed upon discharge to every 8 hours, also taking lexapro and cymbalta       See PHQ-9 for current symptoms.  Other associated symptoms: None    Complicating factors: none  Significant life event:  No   Current substance abuse:  None  Anxiety or Panic symptoms:  No    Sleep - good  Appetite - fair  Exercise - daily    Smoking - cig, yes  Alcohol - no, sober since 12/14/17  Street drugs - no  Marijuana - no  Caffeine - yes    PHQ-9  English PHQ-9   Any Language               Problems taking medications regularly: No, adm by nursing staff    Medication side effects: none    Diet: regular (no restrictions)    Social History   Substance Use Topics     Smoking status: Current Every Day Smoker     Packs/day: 1.00     Types: Cigarettes     Smokeless tobacco: Never Used     Alcohol use Not on file        Problem list and histories reviewed & adjusted, as indicated.  Additional history: as documented    Patient Active Problem List   Diagnosis     Sedative and hypnotic drug poisoning     Depression with suicidal ideation     Chemical dependency (H)     Past Surgical History:   Procedure Laterality Date     BUNIONECTOMY  2004, 2010     HYSTERECTOMY  07/2017    Partial       Social History   Substance Use Topics     Smoking status: Current Every Day Smoker     Packs/day: 1.00     Types: Cigarettes     Smokeless tobacco: Never Used     Alcohol use Not on  file     Family History   Problem Relation Age of Onset     Lung Cancer Paternal Grandfather            Current Outpatient Prescriptions   Medication Sig Dispense Refill     albuterol (VENTOLIN HFA) 108 (90 BASE) MCG/ACT Inhaler Inhale 1 puff into the lungs every 4 hours as needed for shortness of breath / dyspnea or wheezing       fluticasone (FLOVENT HFA) 220 MCG/ACT Inhaler Inhale 1 puff into the lungs 2 times daily       Acetaminophen (TYLENOL PO) Take 650 mg by mouth every 4 hours as needed for mild pain or fever       IBUPROFEN PO Take 400 mg by mouth every 6 hours as needed for moderate pain       alum & mag hydroxide-simethicone (MYLANTA/MAALOX) 200-200-20 MG/5ML SUSP suspension Take 30 mLs by mouth every 6 hours as needed for indigestion       guaiFENesin (ROBITUSSIN) 20 mg/mL SOLN solution Take 10 mLs by mouth every 4 hours as needed for cough       phenol-menthol (CEPASTAT) 14.5 MG lozenge Place 1 lozenge inside cheek every 2 hours as needed for moderate pain       loratadine (CLARITIN) 10 MG tablet Take 10 mg by mouth daily as needed for allergies       senna-docusate (SENOKOT-S;PERICOLACE) 8.6-50 MG per tablet Take 2 tablets by mouth daily as needed for constipation       MELATONIN PO Take 3 mg by mouth nightly as needed       DULoxetine (CYMBALTA) 60 MG EC capsule Take 1 capsule (60 mg) by mouth daily 30 capsule 0     QUEtiapine (SEROQUEL) 25 MG tablet Take 1-2 tablets (25-50 mg) by mouth every 8 hours as needed (anxiety) 90 tablet 0     hydrOXYzine (ATARAX) 25 MG tablet Take 1-2 tablets (25-50 mg) by mouth every 4 hours as needed for anxiety 120 tablet 0     gabapentin (NEURONTIN) 300 MG capsule Take 1 capsule (300 mg) by mouth 3 times daily 90 capsule 0     escitalopram (LEXAPRO) 20 MG tablet Take 1 tablet (20 mg) by mouth daily 30 tablet 0     traZODone (DESYREL) 50 MG tablet Take 1-3 tablets ( mg) by mouth At Bedtime 90 tablet 0     multivitamin, therapeutic with minerals (THERA-VIT-M) TABS  tablet Take 1 tablet by mouth daily 30 each 0     No Known Allergies  Recent Labs   Lab Test  12/26/17   1958  12/15/17   1206   LDL   --   83   HDL   --   98   TRIG   --   76   ALT   --   16   CR  0.74  0.70   GFRESTIMATED  84  >90   GFRESTBLACK  >90  >90   POTASSIUM  3.7  4.0   TSH  0.69  0.30*      BP Readings from Last 3 Encounters:   01/05/18 108/64    Wt Readings from Last 3 Encounters:   01/02/18 132 lb 1.6 oz (59.9 kg)        Labs reviewed in EPIC  Problem list, Medication list, Allergies, and Medical/Social/Surgical histories reviewed in [facility] and updated as appropriate.     ROS: Constitutional, neuro, ENT, endocrine, pulmonary, cardiac, gastrointestinal, genitourinary, musculoskeletal, integument and psychiatric systems are negative, except as otherwise noted above in the HPI.      OBJECTIVE:                                                    BP 98/60  Pulse 78  Temp 97.7  F (36.5  C) (Oral)  Resp 16  Wt 135 lb (61.2 kg)  SpO2 99%  BMI 23.17 kg/m2  There is no height or weight on file to calculate BMI.  GENERAL:  Alert, thin, mild distress  EYES: Eyes grossly normal to inspection,   RESP: lungs clear to auscultation - no rales, no rhonchi, no wheezes  CV: regular rates and rhythm, normal S1 S2, no S3 or S4 and no murmur, no click or rub -  MS: extremities- no gross deformities noted, no edema  SKIN: no suspicious lesions, no rashes    Mental Status Assessment:  Appearance:   Appropriate   Eye Contact:   Good   Psychomotor Behavior: Restless   Attitude:   Cooperative   Orientation:   All  Speech   Rate / Production: Normal    Volume:  Normal   Mood:    Anxious   Affect:    Worrisome   Thought Content:  Clear   Thought Form:  Coherent  Logical   Insight:    Fair   Attention Span and Concentration:  limited  Recent and Remote Memory:  intact  Fund of Knowledge: appropriate  Muscle Strength and Tone: normal   Suicidal Ideation: reports no thoughts, no intention  Hallucination:  No  Paranoid-No  Manic-No  Panic-No  Self harm-No      Diagnostic Test Results:  Results for orders placed or performed during the hospital encounter of 12/26/17   Drug abuse screen urine   Result Value Ref Range    Amphetamine Qual Urine Negative NEG^Negative    Barbiturates Qual Urine Positive (A) NEG^Negative    Benzodiazepine Qual Urine Negative NEG^Negative    Cannabinoids Qual Urine Negative NEG^Negative    Cocaine Qual Urine Negative NEG^Negative    Opiates Qualitative Urine Negative NEG^Negative    PCP Qual Urine Negative NEG^Negative   CBC with platelets differential   Result Value Ref Range    WBC 7.4 4.0 - 11.0 10e9/L    RBC Count 4.18 3.8 - 5.2 10e12/L    Hemoglobin 12.5 11.7 - 15.7 g/dL    Hematocrit 37.0 35.0 - 47.0 %    MCV 89 78 - 100 fl    MCH 29.9 26.5 - 33.0 pg    MCHC 33.8 31.5 - 36.5 g/dL    RDW 14.9 10.0 - 15.0 %    Platelet Count 291 150 - 450 10e9/L    Diff Method Automated Method     % Neutrophils 51.1 %    % Lymphocytes 38.0 %    % Monocytes 6.8 %    % Eosinophils 3.7 %    % Basophils 0.3 %    % Immature Granulocytes 0.1 %    Nucleated RBCs 0 0 /100    Absolute Neutrophil 3.8 1.6 - 8.3 10e9/L    Absolute Lymphocytes 2.8 0.8 - 5.3 10e9/L    Absolute Monocytes 0.5 0.0 - 1.3 10e9/L    Absolute Eosinophils 0.3 0.0 - 0.7 10e9/L    Absolute Basophils 0.0 0.0 - 0.2 10e9/L    Abs Immature Granulocytes 0.0 0 - 0.4 10e9/L    Absolute Nucleated RBC 0.0    Basic metabolic panel   Result Value Ref Range    Sodium 141 133 - 144 mmol/L    Potassium 3.7 3.4 - 5.3 mmol/L    Chloride 107 94 - 109 mmol/L    Carbon Dioxide 29 20 - 32 mmol/L    Anion Gap 5 3 - 14 mmol/L    Glucose 86 70 - 99 mg/dL    Urea Nitrogen 8 7 - 30 mg/dL    Creatinine 0.74 0.52 - 1.04 mg/dL    GFR Estimate 84 >60 mL/min/1.7m2    GFR Estimate If Black >90 >60 mL/min/1.7m2    Calcium 8.9 8.5 - 10.1 mg/dL   TSH with free T4 reflex and/or T3 as indicated   Result Value Ref Range    TSH 0.69 0.40 - 4.00 mU/L   EKG 12-lead, tracing only    Result Value Ref Range    Interpretation ECG Click View Image link to view waveform and result    Chemical Dependency IP Consult    Narrative    Yani Nicholas, [facility]     1/3/2018  1:29 PM  1/3/18 cd consult.  Met with patient, recommending residential   treatment at [facility].  I will complete evaluation   paperwork and send referral to LP for review, will require   nurse-nurse review prior to approval for priority waitlist.  I   will follow up with  on unit tomorrow regarding   status.        ASSESSMENT/PLAN:                                                    ASSESSMENT / PLAN:  (F41.1) WILFRIDO (generalized anxiety disorder)  (primary encounter diagnosis)  Comment: worse since Seroquel dosing dropped to 50mg 2x daily, struggling to attend groups, dose of Seroquel was changes upon discharge to every 8 hours   Plan: Will increase Seroquel dosing to 50mg 3x daily as needed    (F32.9,  R45.851) Depression with suicidal ideation  Comment: stable, improved with addition of Cymbalta in the hospital  Plan: QUEtiapine (SEROQUEL) 25 MG tablet        Seroquel increase to help with anxiety        Patient Instructions   We have increased your Seroquel to 50mg 3x daily to help with your anxiety         JACE Quintero CNP  [facility]

## 2018-01-09 NOTE — MR AVS SNAPSHOT
After Visit Summary   1/9/2018    Mae Holder    MRN: [id number]           Patient Information     Date Of Birth          1970        Visit Information        Provider Department      1/9/2018 2:30 PM Yessica Rosas APRN [facility]        Today's Diagnoses     WILFRIDO (generalized anxiety disorder)    -  1    Depression with suicidal ideation          Care Instructions    We have increased your Seroquel to 50mg 3x daily to help with your anxiety           Follow-ups after your visit        Your next 10 appointments already scheduled     Nilay 10, 2018  7:15 AM CST   Treatment with ADULT LODGING PLUS E   [facility] ([facility])    [address] [place] [address]   [phone]            Jan 11, 2018  7:15 AM CST   Treatment with ADULT LODGING PLUS E   [facility] ([facility])    [address] [place] [address]   [phone]            Jan 12, 2018  7:15 AM CST   Treatment with ADULT LODGING PLUS E   [facility] ([facility])    [address] [place] [address]   [phone]            Jan 13, 2018  7:15 AM CST   Treatment with ADULT LODGING PLUS E   [facility] ([facility])    [address] [place] [address]   [phone]            Jan 14, 2018  7:15 AM CST   Treatment with ADULT LODGING PLUS E   [facility] ([facility])    [address] [place] [address]   [phone]            Nilay 15, 2018  7:15 AM CST   Treatment with ADULT LODGING PLUS E   [facility] ([facility])    [address] [place] [address]  [facility] [address]   [phone]            Jan 16, 2018  7:15 AM CST   Treatment with ADULT LODGING PLUS E   [facility] ([facility])    [facility] [address] [address]   [phone]            Jan 16, 2018  9:30 AM CST   New Visit with Mehdi Ross MD   [facility] ([facility])    [address] [place] [address]  [address]  [facility] [address]   [phone]            Jan 17, 2018  7:15 AM CST   Treatment with ADULT LODGING PLUS E   [facility] ([facility])    [address] [place] [address]   [phone]            Jan 18, 2018  7:15 AM CST   Treatment with ADULT LODGING PLUS E   [facility] ([facility])    [address] [place] [address] [phone]              Who to contact     If you have questions or need follow up information about today's clinic visit or your schedule please contact [facility] PRIMARY [facility] directly at [phone].  Normal or non-critical lab and imaging results will be communicated to you by 10X10 Roomhart, letter or phone within 4 business days after the clinic has received the results. If you do not hear from us within 7 days, please contact the clinic through 10X10 Roomhart or phone. If you have a critical or abnormal lab result, we will notify you by phone as soon as possible.  Submit refill requests through Stitch or call your pharmacy and they will forward the refill request to us. Please allow 3 business days for your refill to be completed.          Additional Information About Your Visit        Stitch Information     Stitch lets you send messages to your doctor, view your test results, renew your prescriptions, schedule appointments and more. To sign up, go to  [email or web address] . Click on \"Log in\" on the left side of the screen, which will take you to the Welcome page. Then click on \"Sign up Now\" on the right side of the page.     You will be asked to enter the access code listed below, as well as some personal information. Please follow the directions to create your username and password.     Your access code is: [email or web address]  Expires: 3/21/2018 10:09 AM     Your access code will  in 90 days. If you need help or a new code, please call your [place] clinic or [phone].        Care EveryWhere ID     This is your Care EveryWhere ID. This could be used by other organizations to access your [place] medical records  [id number]        Your Vitals Were     Pulse Temperature Respirations Pulse Oximetry BMI (Body Mass Index)       78 97.7  F (36.5  C) (Oral) 16 99% 23.17 kg/m2        Blood Pressure from Last 3 Encounters:   18 98/60   18 108/64    Weight from Last 3 Encounters:   18 135 lb (61.2 kg)   18 132 lb 1.6 oz (59.9 kg)              Today, you had the following     No orders found for display         Today's Medication Changes          These changes are accurate as of: 18 11:59 PM.  If you have any questions, ask your nurse or doctor.               These medicines have changed or have updated prescriptions.        Dose/Directions    QUEtiapine 25 MG tablet   Commonly known as:  SEROquel   This may have changed:    - how much to take  - when to take this  - reasons to take this   Used for:  Depression with suicidal ideation   Changed by:  Yessica Rosas, JACE CNP        Dose:  50 mg   Take 2 tablets (50 mg) by mouth 3 times daily   Quantity:  90 tablet   Refills:  1            Where to get your medicines      These medications were sent to [facility] [place], [place] - [address]  [address] [address]     Phone:  [phone]     QUEtiapine 25 MG tablet                " Primary Care Provider Office Phone # Fax #    Natty Barone [phone]       [facility] [address] [place] [place] [address]        Equal Access to Services     LOUISE MARQUEZ : Hadii aad ku hadalfredosurinder Sohrty, waaxda luqadaha, qaybta kaalmada aditya, ismael erikain hayaaalcon currywilfredo geramaryfatoumata melgar. So [facility] [phone].    ATENCIÓN: Si habla español, tiene a polo disposición servicios gratuitos de asistencia lingüística. Llame al [phone].    We comply with applicable federal civil rights laws and [place] laws. We do not discriminate on the basis of race, color, national origin, age, disability, sex, sexual orientation, or gender identity.            Thank you!     Thank you for choosing [facility]ARE  for your care. Our goal is always to provide you with excellent care. Hearing back from our patients is one way we can continue to improve our services. Please take a few minutes to complete the written survey that you may receive in the mail after your visit with us. Thank you!             Your Updated Medication List - Protect others around you: Learn how to safely use, store and throw away your medicines at www.disposemymeds.org.          This list is accurate as of: 1/9/18 11:59 PM.  Always use your most recent med list.                   Brand Name Dispense Instructions for use Diagnosis    alum & mag hydroxide-simethicone 200-200-20 MG/5ML Susp suspension    MYLANTA/MAALOX     Take 30 mLs by mouth every 6 hours as needed for indigestion        DULoxetine 60 MG EC capsule    CYMBALTA    30 capsule    Take 1 capsule (60 mg) by mouth daily    Depression with suicidal ideation       escitalopram 20 MG tablet    LEXAPRO    30 tablet    Take 1 tablet (20 mg) by mouth daily    Sedative, hypnotic or anxiolytic dependence (H)       fluticasone 220 MCG/ACT Inhaler    FLOVENT HFA     Inhale 1 puff into the lungs 2 times daily        gabapentin 300 MG capsule    NEURONTIN     90 capsule    Take 1 capsule (300 mg) by mouth 3 times daily    Sedative, hypnotic or anxiolytic dependence (H)       guaiFENesin 20 mg/mL Soln solution    ROBITUSSIN     Take 10 mLs by mouth every 4 hours as needed for cough        hydrOXYzine 25 MG tablet    ATARAX    120 tablet    Take 1-2 tablets (25-50 mg) by mouth every 4 hours as needed for anxiety    Sedative, hypnotic or anxiolytic dependence (H)       IBUPROFEN PO      Take 400 mg by mouth every 6 hours as needed for moderate pain        loratadine 10 MG tablet    CLARITIN     Take 10 mg by mouth daily as needed for allergies        MELATONIN PO      Take 3 mg by mouth nightly as needed        multivitamin, therapeutic with minerals Tabs tablet     30 each    Take 1 tablet by mouth daily    Sedative, hypnotic or anxiolytic dependence (H)       phenol-menthol 14.5 MG lozenge      Place 1 lozenge inside cheek every 2 hours as needed for moderate pain        QUEtiapine 25 MG tablet    SEROquel    90 tablet    Take 2 tablets (50 mg) by mouth 3 times daily    Depression with suicidal ideation       senna-docusate 8.6-50 MG per tablet    SENOKOT-S;PERICOLACE     Take 2 tablets by mouth daily as needed for constipation        traZODone 50 MG tablet    DESYREL    90 tablet    Take 1-3 tablets ( mg) by mouth At Bedtime    Sedative, hypnotic or anxiolytic dependence (H)       TYLENOL PO      Take 650 mg by mouth every 4 hours as needed for mild pain or fever        VENTOLIN  (90 BASE) MCG/ACT Inhaler   Generic drug:  albuterol      Inhale 1 puff into the lungs every 4 hours as needed for shortness of breath / dyspnea or wheezing

## 2018-01-09 NOTE — NURSING NOTE
"Chief Complaint   Patient presents with     Lodging Plus       Initial BP 98/60  Pulse 78  Temp 97.7  F (36.5  C) (Oral)  Resp 16  Wt 135 lb (61.2 kg)  SpO2 99%  BMI 23.17 kg/m2 Estimated body mass index is 23.17 kg/(m^2) as calculated from the following:    Height as of 12/26/17: 5' 4\" (1.626 m).    Weight as of this encounter: 135 lb (61.2 kg).  Medication Reconciliation: complete     Misha Isaac, LUCITA    "

## 2018-01-10 ENCOUNTER — HOSPITAL ENCOUNTER (OUTPATIENT)
Dept: BEHAVIORAL HEALTH | Facility: CLINIC | Age: 48
End: 2018-01-10
Attending: FAMILY MEDICINE
Payer: COMMERCIAL

## 2018-01-10 PROCEDURE — H2035 A/D TX PROGRAM, PER HOUR: HCPCS | Mod: HQ

## 2018-01-10 PROCEDURE — 10020000 ZZH LODGING PLUS FACILITY CHARGE ADULT

## 2018-01-10 NOTE — PROGRESS NOTES
Name: Mae Holder  Date: 1/10/2018  Medical Record: [id number]    Envelope Number: [id number]    List of Contents (List each item separately in new row):   1 bottle of Quetiapine Fumarate 25mg Tabs    Admission:  I am responsible for any personal items that are not sent to the safe or pharmacy.  [facility] is not responsible for loss, theft or damage of any property in my possession.      Patient Signature:  ___________________________________________       Date/Time:__________________________    Staff Signature: __________________________________       Date/Time:__________________________    2nd Staff person, if patient is unable/unwilling to sign:      __________________________________________________________       Date/Time: __________________________      Discharge:  [facility] has returned all of my personal belongings:    Patient Signature: ________________________________________     Date/Time: ____________________________________    Staff Signature: ______________________________________     Date/Time:_____________________________________

## 2018-01-10 NOTE — PROGRESS NOTES
Patient:  Mae Holder            Adult CD Progress Note and Treatment Plan Review     Attendance  Please refer to OP BEH CD Adult Attendance Record Documentation Flowsheet    Support group attended this week: yes    Reporting sobriety:  yes    Treatment Plan     Treatment Plan Review competed on: 1/10/18       Client preferred learning style: Visual  Hands on  Demonstration    Staff Members contributing  Linda Vivas [facility], Marino Mota [facility], Viridiana Yoon [facility],  nurses, additional  counselors, Greg Alvarez MA,SHAQ, Pallavi CraigMai-Btvkb-amuegzgqt care                     Received Supervision: yes    Client: contributed to goals and plan.    Client received copy of plan/revised plan: Yes    Client agrees with plan/revised plan: Yes    Changes to Treatment Plan: Yes    New Goals added since last review  none    Goals worked on since last review   Continuing stabilization, consequences of use, relapse prevention, spirituality, grief/loss, education about addiction, aftercare planning.    Strategies effective: yes    Strategies need these changes: NA    1) Care Coordination Activities:  information provided about aftercare programming   2) Medical, Mental Health and other appointments the client attended: [facility] clinic visit  3) Medication issues:  Seroquel prescribed for anxiety  4) Physical and mental health problems:  anxiousness  5) Review and evaluation of the individual abuse prevention plan: no abuse prevention plan developed    ASAM Risk Rating:    Dimension 1   0  Pt denies any symptoms of withdrawal at this time.      Dimension 2  0 Pt denies any medical issues that would impair her ability to participate in programming.  Pt is able to access medical care as needed.      Dimension 3  2  Pt reports feeling some anxiousness and stress due to relationship issues.  Pt reports her significant partner phoned her and ended their relationship over the past weekend.  Pt reports feeling sadness and grief in relation to this  loss and attended the grief/loss focus group to gain support.  Pt shared she receives shaming messages from her mother.  Pt shared an assignment to aid her in beginning to build healthy esteem.  Pt completed a checklist of characteristics of adult children of an addicted parent identifying positively with all 13 listed characteristics.  Pt has an appointment set with therapist Bren Barbosa on 1/11/18.  Pt denies any thoughts of suicide or self harm at this time.    Dimension 4  1  Pt is attending programming and participating in exercises.  Pt plans to share an assignment identifying consequences of her use.    Dimension 5   4  Pt rated her cravings at an 8, on a scale of 1 to 10, ten being high.  She identified coping skills to include speaking with peers and staff, attending AA meetings and taking walks.  Pt plans to attend relapse prevention workshops over the upcoming weekend.  Pt has been encouraged to work toward developing boundaries with others.   Pt reports there is nothing is hindering her from being successful in [facility] this week.      Dimension 6  4  Pt invited her parents to attend the family week program.  Pt is currently homeless and verbalizes her willingness to return to [facility] or an alternative program following [facility].  Counselors provided information about the [facility] outpatient program with partnering sober housing.   Pt attends AA meetings and spends time with female peers.      Any changes in Vulnerable Adult Status?  No  If yes, add to treatment plan and individual abuse prevention plan.    Family Involvement:   none schedule this week    Data:   client did participate   Pt attends lectures and participates in discussions following.   Pt reports she was able to relate to individuals who shared their recovery stories.  She reports finding the lectures focusing on anger, emotional management and working the steps of AA to be helpful.     Intervention:   Aftercare  planning  Counselor feedback  Education  Emotional management  Group feedback  Relapse prevention  Client & counselor reviewed and signed ISP & assessment summary    Assessment:   Stages of Change Model  Contemplation  Preparation/Determination    Appears/Sounds:  Cooperative  Motivated  Engaged  Anxious    Plan:  Focus on recovery environment  Monitor emotional/physical health    Marino Mota [facility]

## 2018-01-10 NOTE — PROGRESS NOTES
Name: Mae Holder  Date: 1/10/2018  Medical Record: [id number]    Envelope Number: [id number]    List of Contents (List each item separately in new row):   1 Cell Phone (i Phone) & 2 Cords (White)     Admission:  I am responsible for any personal items that are not sent to the safe or pharmacy.  [facility] is not responsible for loss, theft or damage of any property in my possession.      Patient Signature:  ___________________________________________       Date/Time:__________________________    Staff Signature: __________________________________       Date/Time:__________________________    2nd Staff person, if patient is unable/unwilling to sign:      __________________________________________________________       Date/Time: __________________________      Discharge:  [facility] has returned all of my personal belongings:    Patient Signature: ________________________________________     Date/Time: ____________________________________    Staff Signature: ______________________________________     Date/Time:_____________________________________

## 2018-01-11 ENCOUNTER — HOSPITAL ENCOUNTER (OUTPATIENT)
Dept: BEHAVIORAL HEALTH | Facility: CLINIC | Age: 48
End: 2018-01-11
Attending: FAMILY MEDICINE
Payer: COMMERCIAL

## 2018-01-11 PROCEDURE — 10020000 ZZH LODGING PLUS FACILITY CHARGE ADULT

## 2018-01-11 PROCEDURE — H2035 A/D TX PROGRAM, PER HOUR: HCPCS

## 2018-01-11 PROCEDURE — H2035 A/D TX PROGRAM, PER HOUR: HCPCS | Mod: HQ
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
## 2018-01-12 ENCOUNTER — HOSPITAL ENCOUNTER (OUTPATIENT)
Dept: BEHAVIORAL HEALTH | Facility: CLINIC | Age: 48
End: 2018-01-12
Attending: FAMILY MEDICINE
Payer: COMMERCIAL

## 2018-01-12 ENCOUNTER — COMMUNICATION - HEALTHEAST (OUTPATIENT)
Dept: FAMILY MEDICINE | Facility: CLINIC | Age: 48
End: 2018-01-12

## 2018-01-12 PROCEDURE — 10020000 ZZH LODGING PLUS FACILITY CHARGE ADULT

## 2018-01-12 PROCEDURE — H2035 A/D TX PROGRAM, PER HOUR: HCPCS | Mod: HQ

## 2018-01-13 ENCOUNTER — HOSPITAL ENCOUNTER (OUTPATIENT)
Dept: BEHAVIORAL HEALTH | Facility: CLINIC | Age: 48
End: 2018-01-13
Attending: FAMILY MEDICINE
Payer: COMMERCIAL

## 2018-01-13 PROCEDURE — H2035 A/D TX PROGRAM, PER HOUR: HCPCS | Mod: HQ

## 2018-01-13 PROCEDURE — 10020000 ZZH LODGING PLUS FACILITY CHARGE ADULT

## 2018-01-14 ENCOUNTER — HOSPITAL ENCOUNTER (OUTPATIENT)
Dept: BEHAVIORAL HEALTH | Facility: CLINIC | Age: 48
End: 2018-01-14
Attending: FAMILY MEDICINE
Payer: COMMERCIAL

## 2018-01-14 PROCEDURE — H2035 A/D TX PROGRAM, PER HOUR: HCPCS | Mod: HQ

## 2018-01-14 PROCEDURE — 10020000 ZZH LODGING PLUS FACILITY CHARGE ADULT

## 2018-01-15 ENCOUNTER — HOSPITAL ENCOUNTER (OUTPATIENT)
Dept: BEHAVIORAL HEALTH | Facility: CLINIC | Age: 48
End: 2018-01-15
Attending: FAMILY MEDICINE
Payer: COMMERCIAL

## 2018-01-15 PROCEDURE — 10020000 ZZH LODGING PLUS FACILITY CHARGE ADULT

## 2018-01-15 PROCEDURE — H2035 A/D TX PROGRAM, PER HOUR: HCPCS | Mod: HQ

## 2018-01-16 ENCOUNTER — OFFICE VISIT (OUTPATIENT)
Dept: ADDICTION MEDICINE | Facility: CLINIC | Age: 48
End: 2018-01-16
Payer: COMMERCIAL

## 2018-01-16 ENCOUNTER — TELEPHONE (OUTPATIENT)
Dept: BEHAVIORAL HEALTH | Facility: CLINIC | Age: 48
End: 2018-01-16

## 2018-01-16 ENCOUNTER — TELEPHONE (OUTPATIENT)
Dept: FAMILY MEDICINE | Facility: CLINIC | Age: 48
End: 2018-01-16

## 2018-01-16 ENCOUNTER — HOSPITAL ENCOUNTER (OUTPATIENT)
Dept: BEHAVIORAL HEALTH | Facility: CLINIC | Age: 48
End: 2018-01-16
Attending: FAMILY MEDICINE
Payer: COMMERCIAL

## 2018-01-16 VITALS
HEART RATE: 83 BPM | WEIGHT: 136.5 LBS | BODY MASS INDEX: 23.43 KG/M2 | TEMPERATURE: 98.5 F | OXYGEN SATURATION: 99 % | DIASTOLIC BLOOD PRESSURE: 68 MMHG | SYSTOLIC BLOOD PRESSURE: 104 MMHG | RESPIRATION RATE: 16 BRPM

## 2018-01-16 DIAGNOSIS — F11.20 UNCOMPLICATED OPIOID DEPENDENCE (H): Primary | ICD-10-CM

## 2018-01-16 PROCEDURE — 10020000 ZZH LODGING PLUS FACILITY CHARGE ADULT

## 2018-01-16 PROCEDURE — 99203 OFFICE O/P NEW LOW 30 MIN: CPT | Performed by: FAMILY MEDICINE

## 2018-01-16 PROCEDURE — H2035 A/D TX PROGRAM, PER HOUR: HCPCS | Mod: HQ

## 2018-01-16 RX ORDER — BUPRENORPHINE AND NALOXONE 4; 1 MG/1; MG/1
1 FILM, SOLUBLE BUCCAL; SUBLINGUAL
Qty: 14 FILM | Refills: 0 | Status: SHIPPED | OUTPATIENT
Start: 2018-01-16 | End: 2018-01-23

## 2018-01-16 NOTE — PROGRESS NOTES
SUBJECTIVE:   Mae Holder is a 47 year old female who presents to clinic today for the following health issues:          ADDICTION MEDICINE NOTE:    INITIAL VISIT    [facility] PATIENT    HERE TO DISCUSS MAT OPTIONS    SEVERAL YR HISTORY OF ADDICTION  ALCOHOL, OPIOIDS DRUGS OF CHOICE    TREATMENT AT [facility] 1 YR AGO  HISTORY OF SUCCESS WITH VIVITROL FOR ALCOHOL DEPENDENCE A FEW YRS AGO; TOOK IT FOR A YEAR    RECENT DETOX, THEN WENT TO THE RETREAT  DEVELOPED DEPRESSION THERE AND WAS HOSPITALIZED AT [facility] FOR A SHORT TIME  NOW IN [facility]    HAVING INTENSE CRAVING FOR ALCOHOL AND OPIOIDS  HISTORY OXYCODONE USE, HEROIN ONLY 1 TIME BUT LOVED IT    DISCUSSED PROS AND CONS OF SUBOXONE VS. VIVITROL  CONSIDERING GOING BACK TO THE [facility]REAT AFTER [facility] BUT CAN'T IF SHE IS ON SUBOXONE  REALIZES SUBOXONE RELIEVES CRAVING BETTER THAN NALTREXONE    ELECTS TO PROCEED WITH SUBOXONE     WILL BEGIN 4 MG BID     RE-CHECK 1 WEEK    Problem list and histories reviewed & adjusted, as indicated.  Additional history: as documented    Patient Active Problem List   Diagnosis     Sedative and hypnotic drug poisoning     Depression with suicidal ideation     Chemical dependency (H)     Past Surgical History:   Procedure Laterality Date     BUNIONECTOMY  2004, 2010     HYSTERECTOMY  07/2017    Partial       Social History   Substance Use Topics     Smoking status: Current Every Day Smoker     Packs/day: 1.00     Types: Cigarettes     Smokeless tobacco: Never Used     Alcohol use Not on file     Family History   Problem Relation Age of Onset     Lung Cancer Paternal Grandfather          Current Outpatient Prescriptions   Medication Sig Dispense Refill     buprenorphine HCl-naloxone HCl (SUBOXONE) 4-1 MG per film Place 1 Film under the tongue 2 times daily 14 Film 0     QUETIAPINE FUMARATE PO Take 50 mg by mouth 3 times daily       QUEtiapine (SEROQUEL) 25 MG tablet Take 2 tablets (50 mg) by mouth 3 times daily 90  tablet 1     albuterol (VENTOLIN HFA) 108 (90 BASE) MCG/ACT Inhaler Inhale 1 puff into the lungs every 4 hours as needed for shortness of breath / dyspnea or wheezing       fluticasone (FLOVENT HFA) 220 MCG/ACT Inhaler Inhale 1 puff into the lungs 2 times daily       Acetaminophen (TYLENOL PO) Take 650 mg by mouth every 4 hours as needed for mild pain or fever       IBUPROFEN PO Take 400 mg by mouth every 6 hours as needed for moderate pain       alum & mag hydroxide-simethicone (MYLANTA/MAALOX) 200-200-20 MG/5ML SUSP suspension Take 30 mLs by mouth every 6 hours as needed for indigestion       guaiFENesin (ROBITUSSIN) 20 mg/mL SOLN solution Take 10 mLs by mouth every 4 hours as needed for cough       phenol-menthol (CEPASTAT) 14.5 MG lozenge Place 1 lozenge inside cheek every 2 hours as needed for moderate pain       loratadine (CLARITIN) 10 MG tablet Take 10 mg by mouth daily as needed for allergies       senna-docusate (SENOKOT-S;PERICOLACE) 8.6-50 MG per tablet Take 2 tablets by mouth daily as needed for constipation       MELATONIN PO Take 3 mg by mouth nightly as needed       DULoxetine (CYMBALTA) 60 MG EC capsule Take 1 capsule (60 mg) by mouth daily 30 capsule 0     hydrOXYzine (ATARAX) 25 MG tablet Take 1-2 tablets (25-50 mg) by mouth every 4 hours as needed for anxiety 120 tablet 0     gabapentin (NEURONTIN) 300 MG capsule Take 1 capsule (300 mg) by mouth 3 times daily 90 capsule 0     escitalopram (LEXAPRO) 20 MG tablet Take 1 tablet (20 mg) by mouth daily 30 tablet 0     traZODone (DESYREL) 50 MG tablet Take 1-3 tablets ( mg) by mouth At Bedtime 90 tablet 0     multivitamin, therapeutic with minerals (THERA-VIT-M) TABS tablet Take 1 tablet by mouth daily 30 each 0     No Known Allergies  Labs reviewed in EPIC      [facility] and [facility]       Reviewed and updated as needed this visit by Provider         ROS:      OBJECTIVE:     /68  Pulse  83  Temp 98.5  F (36.9  C) (Oral)  Resp 16  Wt 136 lb 8 oz (61.9 kg)  SpO2 99%  BMI 23.43 kg/m2  Body mass index is 23.43 kg/(m^2).     ROS:  Constitutional, HEENT, cardiovascular, pulmonary, gi and gu systems are negative, except as otherwise noted.    /68  Pulse 83  Temp 98.5  F (36.9  C) (Oral)  Resp 16  Wt 136 lb 8 oz (61.9 kg)  SpO2 99%  BMI 23.43 kg/m2  EXAM:  GENERAL APPEARANCE: healthy, alert and no distress  EYES: Eyes grossly normal to inspection, PERRL and conjunctivae and sclerae normal  NEURO: Normal strength and tone, mentation intact and speech normal  PSYCH: mentation appears normal and affect normal/bright  MENTAL STATUS EXAM:  Appearance/Behavior: No apparent distress and Casually groomed  Speech: Normal  Mood/Affect: normal affect  Insight: Adequate          ASSESSMENT:   OPIOID DEPENDENCE   ALCOHOL DEPENDENCE     PLAN:       ICD-10-CM    1. Uncomplicated opioid dependence (H) F11.20 buprenorphine HCl-naloxone HCl (SUBOXONE) 4-1 MG per film           MEDICATIONS:   Orders Placed This Encounter   Medications     buprenorphine HCl-naloxone HCl (SUBOXONE) 4-1 MG per film     Sig: Place 1 Film under the tongue 2 times daily     Dispense:  14 Film     Refill:  0          - Continue other medications without change  FUTURE APPOINTMENTS:       - Follow-up visit in 1 WEEK    Mehdi Ross MD  [facility] PRIMARY [facility]

## 2018-01-16 NOTE — MR AVS SNAPSHOT
After Visit Summary   1/16/2018    Mae Holder    MRN: [id number]           Patient Information     Date Of Birth          1970        Visit Information        Provider Department      1/16/2018 9:30 AM Mehdi Ross MD [facility] Primary [facility]        Today's Diagnoses     Uncomplicated opioid dependence (H)    -  1       Follow-ups after your visit        Your next 10 appointments already scheduled     Jan 17, 2018  7:15 AM CST   Treatment with ADULT [facility] E   [facility] ([facility])    [address] [place] [address]   [phone]            Jan 18, 2018  7:15 AM CST   Treatment with ADULT [facility] PL[facility] E   [facility] ([facility])    [address] [place] [address]   [phone]            Jan 19, 2018  7:15 AM CST   Treatment with ADULT [facility] E   [facility] ([facility])    [address] [place] [address]   [phone]            Jan 20, 2018  7:15 AM CST   Treatment with ADULT [facility] PL[facility] E   [facility] ([facility])    [address] [place] [address]   [phone]            Jan 21, 2018  7:15 AM CST   Treatment with ADULT [facility]ING PL[facility] E   [facility] ([facility])    [address] [place] [address]   [phone]            Jan 22, 2018  7:15 AM CST   Treatment with ADULT [facility] PL[facility] E   [facility] ([facility])    [address] [place] [address]   [phone]            Jan 23, 2018  7:15 AM CST   Treatment with ADULT [facility]ING PL[facility] E   [facility]  "([facility])    [address] [address]   [phone]            Jan 23, 2018 11:00 AM CST   Return Visit with Mehdi Ross MD   [facility]    [address]  [facility] [address]   [phone]            Jan 24, 2018  7:15 AM CST   Treatment with ADULT LODGING PL[facility] E   [facility] ([facility])    [address] [place] [address]   [phone]            Jan 25, 2018  7:15 AM CST   Treatment with ADULT LODGING PL[facility] E   [facility] ([facility])    [address] [place] [address]   [phone]              Who to contact     If you have questions or need follow up information about today's clinic visit or your schedule please contact [facility] PRIMARY [facility] directly at [phone].  Normal or non-critical lab and imaging results will be communicated to you by [facility], letter or phone within 4 business days after the clinic has received the results. If you do not hear from us within 7 days, please contact the clinic through [facility] or phone. If you have a critical or abnormal lab result, we will notify you by phone as soon as possible.  Submit refill requests through [employer] or call your pharmacy and they will forward the refill request to us. Please allow 3 business days for your refill to be completed.          Additional Information About Your Visit        [facility] Information     Sookbox lets you send messages to your doctor, view your test results, renew your prescriptions, schedule appointments and more. To sign up, go to [email or web address] . Click on \"Log in\" on the left side of the screen, which will take you to the Welcome page. Then click on \"Sign up Now\" on the right " side of the page.     You will be asked to enter the access code listed below, as well as some personal information. Please follow the directions to create your username and password.     Your access code is: [email or web address]  Expires: 3/21/2018 10:09 AM     Your access code will  in 90 days. If you need help or a new code, please call your [facility] or [phone].        Care EveryWhere ID     This is your Care EveryWhere ID. This could be used by other organizations to access your [place] medical records  [id number]        Your Vitals Were     Pulse Temperature Respirations Pulse Oximetry BMI (Body Mass Index)       83 98.5  F (36.9  C) (Oral) 16 99% 23.43 kg/m2        Blood Pressure from Last 3 Encounters:   18 104/68   18 98/60   18 112/82    Weight from Last 3 Encounters:   18 136 lb 8 oz (61.9 kg)   18 135 lb (61.2 kg)   18 132 lb 1.6 oz (59.9 kg)              Today, you had the following     No orders found for display         Today's Medication Changes          These changes are accurate as of: 18 11:33 AM.  If you have any questions, ask your nurse or doctor.               Start taking these medicines.        Dose/Directions    buprenorphine HCl-naloxone HCl 4-1 MG per film   Commonly known as:  SUBOXONE   Used for:  Uncomplicated opioid dependence (H)   Started by:  Mehdi Ross MD        Dose:  1 Film   Place 1 Film under the tongue 2 times daily   Quantity:  14 Film   Refills:  0            Where to get your medicines      Some of these will need a paper prescription and others can be bought over the counter.  Ask your nurse if you have questions.     Bring a paper prescription for each of these medications     buprenorphine HCl-naloxone HCl 4-1 MG per film                Primary Care Provider Office Phone # Fax #    Natty Barone [phone]       [facility] [address] [place] [address]        Equal Access to  Services     [facility]: Hadii gaurav musa fideliasurinder Gisselleali, waaxda luqadaha, qaybta kaalmada aditya, ismael fernández . So [facility] [phone].    ATENCIÓN: Si janela adriane, tiene a polo disposición servicios gratuitos de asistencia lingüística. Llame al [phone].    We comply with applicable federal civil rights laws and [place] laws. We do not discriminate on the basis of race, color, national origin, age, disability, sex, sexual orientation, or gender identity.            Thank you!     Thank you for choosing [facility] PRIMARY CARE  for your care. Our goal is always to provide you with excellent care. Hearing back from our patients is one way we can continue to improve our services. Please take a few minutes to complete the written survey that you may receive in the mail after your visit with us. Thank you!             Your Updated Medication List - Protect others around you: Learn how to safely use, store and throw away your medicines at www.disposemymeds.org.          This list is accurate as of: 1/16/18 11:33 AM.  Always use your most recent med list.                   Brand Name Dispense Instructions for use Diagnosis    alum & mag hydroxide-simethicone 200-200-20 MG/5ML Susp suspension    MYLANTA/MAALOX     Take 30 mLs by mouth every 6 hours as needed for indigestion        buprenorphine HCl-naloxone HCl 4-1 MG per film    SUBOXONE    14 Film    Place 1 Film under the tongue 2 times daily    Uncomplicated opioid dependence (H)       DULoxetine 60 MG EC capsule    CYMBALTA    30 capsule    Take 1 capsule (60 mg) by mouth daily    Depression with suicidal ideation       escitalopram 20 MG tablet    LEXAPRO    30 tablet    Take 1 tablet (20 mg) by mouth daily    Sedative, hypnotic or anxiolytic dependence (H)       fluticasone 220 MCG/ACT Inhaler    FLOVENT HFA     Inhale 1 puff into the lungs 2 times daily        gabapentin 300 MG capsule    NEURONTIN    90  capsule    Take 1 capsule (300 mg) by mouth 3 times daily    Sedative, hypnotic or anxiolytic dependence (H)       guaiFENesin 20 mg/mL Soln solution    ROBITUSSIN     Take 10 mLs by mouth every 4 hours as needed for cough        hydrOXYzine 25 MG tablet    ATARAX    120 tablet    Take 1-2 tablets (25-50 mg) by mouth every 4 hours as needed for anxiety    Sedative, hypnotic or anxiolytic dependence (H)       IBUPROFEN PO      Take 400 mg by mouth every 6 hours as needed for moderate pain        loratadine 10 MG tablet    CLARITIN     Take 10 mg by mouth daily as needed for allergies        MELATONIN PO      Take 3 mg by mouth nightly as needed        multivitamin, therapeutic with minerals Tabs tablet     30 each    Take 1 tablet by mouth daily    Sedative, hypnotic or anxiolytic dependence (H)       phenol-menthol 14.5 MG lozenge      Place 1 lozenge inside cheek every 2 hours as needed for moderate pain        * QUETIAPINE FUMARATE PO      Take 50 mg by mouth 3 times daily        * QUEtiapine 25 MG tablet    SEROquel    90 tablet    Take 2 tablets (50 mg) by mouth 3 times daily    Depression with suicidal ideation       senna-docusate 8.6-50 MG per tablet    SENOKOT-S;PERICOLACE     Take 2 tablets by mouth daily as needed for constipation        traZODone 50 MG tablet    DESYREL    90 tablet    Take 1-3 tablets ( mg) by mouth At Bedtime    Sedative, hypnotic or anxiolytic dependence (H)       TYLENOL PO      Take 650 mg by mouth every 4 hours as needed for mild pain or fever        VENTOLIN  (90 BASE) MCG/ACT Inhaler   Generic drug:  albuterol      Inhale 1 puff into the lungs every 4 hours as needed for shortness of breath / dyspnea or wheezing        * Notice:  This list has 2 medication(s) that are the same as other medications prescribed for you. Read the directions carefully, and ask your doctor or other care provider to review them with you.

## 2018-01-16 NOTE — TELEPHONE ENCOUNTER
A phone call was made as follow-up to the Family Program mailing for the week of 1/22/18. Message left or spoke in person to individuals on LATANY[facility].

## 2018-01-16 NOTE — TELEPHONE ENCOUNTER
Central Prior Authorization Team   Phone: [phone]    PA Initiation    Medication: Suboxone 4-1mg  Insurance Company: [employer] - Phone [phone] Fax [phone]  Pharmacy Filling the Rx: [place], [place] - [address]  Filling Pharmacy Phone: [phone]  Filling Pharmacy Fax: [phone]  Start Date: 1/16/2018
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
## 2018-01-16 NOTE — TELEPHONE ENCOUNTER
Prior Authorization Retail Medication Request  Medication/Dose: Suboxone 4-1mg  Diagnosis and ICD code: Uncomplicated opiod dependence F11.20  New/Renewal/Insurance Change PA: new  Previously Tried and Failed Therapies:      Insurance ID (if provided): [id number]  Insurance Phone (if provided): [phone]    Any additional info from fax request:     If you received a fax notification from an outside Pharmacy:  Pharmacy Name:[facility]  Pharmacy #:[phone]  Pharmacy Fax:[phone]    Lizandro Maxwell  Retail Pharm Tech II   [facility]   [address] [place] [id number]   [email or web address]  [email or web address]   Office: [phone]  Fax [phone]

## 2018-01-17 ENCOUNTER — HOSPITAL ENCOUNTER (OUTPATIENT)
Dept: BEHAVIORAL HEALTH | Facility: CLINIC | Age: 48
End: 2018-01-17
Attending: FAMILY MEDICINE
Payer: COMMERCIAL

## 2018-01-17 PROCEDURE — H2035 A/D TX PROGRAM, PER HOUR: HCPCS | Mod: HQ

## 2018-01-17 PROCEDURE — 10020000 ZZH LODGING PLUS FACILITY CHARGE ADULT

## 2018-01-17 PROCEDURE — H2035 A/D TX PROGRAM, PER HOUR: HCPCS
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
## 2018-01-17 NOTE — TELEPHONE ENCOUNTER
Prior Authorization Approval    Authorization Effective Date: 1/16/2018  Authorization Expiration Date: 7/16/2018  Medication: Suboxone 4-1mg-APPROVED  Approved Dose/Quantity:    Reference #: [id number]   Insurance Company: [employer] - Phone [phone] Fax [phone]  Expected CoPay: $3.00     CoPay Card Available:      Foundation Assistance Needed:    Which Pharmacy is filling the prescription (Not needed for infusion/clinic administered): [facility] [place], [place] - [address]  Pharmacy Notified: Yes  Patient Notified: Yes

## 2018-01-18 ENCOUNTER — HOSPITAL ENCOUNTER (OUTPATIENT)
Dept: BEHAVIORAL HEALTH | Facility: CLINIC | Age: 48
End: 2018-01-18
Attending: FAMILY MEDICINE
Payer: COMMERCIAL

## 2018-01-18 PROCEDURE — 10020000 ZZH LODGING PLUS FACILITY CHARGE ADULT

## 2018-01-18 PROCEDURE — H2035 A/D TX PROGRAM, PER HOUR: HCPCS | Mod: HQ

## 2018-01-19 ENCOUNTER — HOSPITAL ENCOUNTER (OUTPATIENT)
Dept: BEHAVIORAL HEALTH | Facility: CLINIC | Age: 48
End: 2018-01-19
Attending: FAMILY MEDICINE
Payer: COMMERCIAL

## 2018-01-19 PROCEDURE — H2035 A/D TX PROGRAM, PER HOUR: HCPCS | Mod: HQ

## 2018-01-19 PROCEDURE — 10020000 ZZH LODGING PLUS FACILITY CHARGE ADULT

## 2018-01-19 NOTE — PROGRESS NOTES
"1/19/18  Patient completed \"Developing a Sense of Self\" assignment and presented in group. Counselors' facilitated group therapy and peers provided supportive feedback. Patient processed ways her positive qualities are negatively impacted when using substances and remaining in co-dependent relationships. Patient's peers noted her mood and serenity appeared to increase when she was not focused on her current relationship troubles. Patient was encouraged to practice gratitude and rely on her higher power to help her regain her sense of self. Patient to continue working on treatment plan goals and assignments.     LISA Dunham  "

## 2018-01-20 ENCOUNTER — HOSPITAL ENCOUNTER (OUTPATIENT)
Dept: BEHAVIORAL HEALTH | Facility: CLINIC | Age: 48
End: 2018-01-20
Attending: FAMILY MEDICINE
Payer: COMMERCIAL

## 2018-01-20 PROCEDURE — H2035 A/D TX PROGRAM, PER HOUR: HCPCS | Mod: HQ

## 2018-01-20 PROCEDURE — 10020000 ZZH LODGING PLUS FACILITY CHARGE ADULT

## 2018-01-21 ENCOUNTER — HOSPITAL ENCOUNTER (OUTPATIENT)
Dept: BEHAVIORAL HEALTH | Facility: CLINIC | Age: 48
End: 2018-01-21
Attending: FAMILY MEDICINE
Payer: COMMERCIAL

## 2018-01-21 PROCEDURE — H2035 A/D TX PROGRAM, PER HOUR: HCPCS | Mod: HQ

## 2018-01-21 PROCEDURE — 10020000 ZZH LODGING PLUS FACILITY CHARGE ADULT

## 2018-01-22 ENCOUNTER — HOSPITAL ENCOUNTER (OUTPATIENT)
Dept: BEHAVIORAL HEALTH | Facility: CLINIC | Age: 48
End: 2018-01-22
Attending: FAMILY MEDICINE
Payer: COMMERCIAL

## 2018-01-22 PROCEDURE — H2035 A/D TX PROGRAM, PER HOUR: HCPCS | Mod: HQ

## 2018-01-22 PROCEDURE — 10020000 ZZH LODGING PLUS FACILITY CHARGE ADULT
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
## 2018-01-23 ENCOUNTER — OFFICE VISIT (OUTPATIENT)
Dept: ADDICTION MEDICINE | Facility: CLINIC | Age: 48
End: 2018-01-23
Payer: COMMERCIAL

## 2018-01-23 ENCOUNTER — HOSPITAL ENCOUNTER (OUTPATIENT)
Dept: BEHAVIORAL HEALTH | Facility: CLINIC | Age: 48
End: 2018-01-23
Attending: FAMILY MEDICINE
Payer: COMMERCIAL

## 2018-01-23 ENCOUNTER — TELEPHONE (OUTPATIENT)
Dept: FAMILY MEDICINE | Facility: CLINIC | Age: 48
End: 2018-01-23

## 2018-01-23 VITALS
TEMPERATURE: 98.4 F | DIASTOLIC BLOOD PRESSURE: 68 MMHG | HEART RATE: 75 BPM | OXYGEN SATURATION: 98 % | RESPIRATION RATE: 14 BRPM | SYSTOLIC BLOOD PRESSURE: 120 MMHG | WEIGHT: 141.5 LBS | BODY MASS INDEX: 24.29 KG/M2

## 2018-01-23 DIAGNOSIS — F11.20 UNCOMPLICATED OPIOID DEPENDENCE (H): ICD-10-CM

## 2018-01-23 PROCEDURE — 99214 OFFICE O/P EST MOD 30 MIN: CPT | Performed by: FAMILY MEDICINE

## 2018-01-23 PROCEDURE — 10020000 ZZH LODGING PLUS FACILITY CHARGE ADULT

## 2018-01-23 PROCEDURE — H2035 A/D TX PROGRAM, PER HOUR: HCPCS | Mod: HQ

## 2018-01-23 RX ORDER — BUPRENORPHINE AND NALOXONE 4; 1 MG/1; MG/1
1 FILM, SOLUBLE BUCCAL; SUBLINGUAL 3 TIMES DAILY
Qty: 84 FILM | Refills: 0 | Status: SHIPPED | OUTPATIENT
Start: 2018-01-23 | End: 2018-02-21

## 2018-01-23 NOTE — PATIENT INSTRUCTIONS
Continue your Buprenorphine 4 mg  films/tabs  3 times daily     Follow up 4 weeks    A prescription has been sent to your pharmacy of choice.  If a prior authorization is required it may take several days to get your medication.  Please make sure your pharmacy had your contact information so they can contact you when it is ready to     You are at risk for overdose  ( including risk of death)  with return to use of opioids after a period of abstinence because your tolerance will have decreased dramatically.      It is strongly recommended that you abstain from alcohol, benzodiazepines (Xanax Valium, Klonipin) , THC, opioids and other drugs of abuse.  Use of these substances increases your risk of relapse for opioids.  Using these substances with Buprenorphine also incresaes your risk of overdose/death (especially alcohol/benzodiazepines).     You are encouraged to have some type of recovery program in addition to medication treatment.  Medication alone is generally not enough to lead to long term recovery.  This may include having some type of sober network, avoiding isolating, avoiding triggers (people, places, things you associate with using opioids).   Such supports may include Alcoholics Anonymous, Narcotics anonymous or other self help organizations as well as counseling.  We can help provide resources to these services.      Narcan kit prescriptions are available if you do not have one.       The addiction medicine clinic number is [phone].    If you cannot make your appointment please call the office and reschedule immediately. If you are out of medication a bridge can be sent to your pharmacy to last until the date of your rescheduled appointment. Our clinic is open from Monday-Friday 0800-4:30pm and there is not an ON CALL after hours service.  If medical care is needed after hours or on the weekend you will need to contact your primary care physician or go to an Urgent Care or ER.     Jose  messages and telephone calls from patients are taken care of by the nursing team within 24 business hours if received between Monday 8am - Friday 4:30pm. Therefore if a refill/bridge is needed it is important to call in advance so you do not run out of medications.     [facility] does not accept [facility] or [employer] assistance insurance.

## 2018-01-23 NOTE — TELEPHONE ENCOUNTER
PA required on: Suboxone 4-1mg films  NDC: [id number]  Patient Insurance: [employer] PMAP (aka prime)  Insurance BIN: [id number]     Insurance PCN: [facility]  Insurance ID: [id number]  Insurance phone number: [phone]   Pharmacy NPI: [id number]    Note this insurance only back dates to date received   Please submit as URGENT thank you    Please let us know if it's approved or denied. Thank You!    Michelle Bergeron CPhT   [facility]  [phone]

## 2018-01-23 NOTE — MR AVS SNAPSHOT
After Visit Summary   1/23/2018    Mae Holder    MRN: [id number]           Patient Information     Date Of Birth          1970        Visit Information        Provider Department      1/23/2018 11:00 AM Mehdi Ross MD [facility]        Today's Diagnoses     Uncomplicated opioid dependence (H)          Care Instructions    Continue your Buprenorphine 4 mg  films/tabs  3 times daily     Follow up 4 weeks    A prescription has been sent to your pharmacy of choice.  If a prior authorization is required it may take several days to get your medication.  Please make sure your pharmacy had your contact information so they can contact you when it is ready to     You are at risk for overdose  ( including risk of death)  with return to use of opioids after a period of abstinence because your tolerance will have decreased dramatically.      It is strongly recommended that you abstain from alcohol, benzodiazepines (Xanax Valium, Klonipin) , THC, opioids and other drugs of abuse.  Use of these substances increases your risk of relapse for opioids.  Using these substances with Buprenorphine also incresaes your risk of overdose/death (especially alcohol/benzodiazepines).     You are encouraged to have some type of recovery program in addition to medication treatment.  Medication alone is generally not enough to lead to long term recovery.  This may include having some type of sober network, avoiding isolating, avoiding triggers (people, places, things you associate with using opioids).   Such supports may include Alcoholics Anonymous, Narcotics anonymous or other self help organizations as well as counseling.  We can help provide resources to these services.      Narcan kit prescriptions are available if you do not have one.       The addiction medicine clinic number is [phone].    If you cannot make your appointment please call the office and reschedule  immediately. If you are out of medication a bridge can be sent to your pharmacy to last until the date of your rescheduled appointment. Our clinic is open from Monday-Friday 0800-4:30pm and there is not an ON CALL after hours service.  If medical care is needed after hours or on the weekend you will need to contact your primary care physician or go to an Urgent Care or ER.     MyChart messages and telephone calls from patients are taken care of by the nursing team within 24 business hours if received between Monday 8am - Friday 4:30pm. Therefore if a refill/bridge is needed it is important to call in advance so you do not run out of medications.     [facility] does not accept [employer] or [employer] assistance insurance.                      Follow-ups after your visit        Your next 10 appointments already scheduled     Jan 24, 2018  7:15 AM CST   Treatment with ADULT LODGING PLUS E   [facility] ([facility])    [address] [place] [address]   [phone]            Jan 25, 2018  7:15 AM CST   Treatment with ADULT LODGING PLUS E   [facility] ([facility])    [address] [place] [address]   [phone]            Jan 26, 2018  7:15 AM CST   Treatment with ADULT LODGING PLUS E   [facility] ([facility])    [address] [place] [address]   [phone]            Jan 27, 2018  7:15 AM CST   Treatment with ADULT LODGING PLUS E   [facility] ([facility])    [address] [place] [address]   [phone]            Jan 28, 2018  7:15 AM CST   Treatment with ADULT LODGING PLUS E   [facility] ([facility])     [address] [address]   [phone]            Jan 29, 2018  7:15 AM CST   Treatment with ADULT LODGING PLUS E   [facility] ([facility])    [address] [address]   [phone]            Jan 30, 2018  7:15 AM CST   Treatment with ADULT LODGING PLUS E   [facility] ([facility])    [place] [address] [address]   [phone]            Jan 31, 2018  7:15 AM CST   Treatment with ADULT LODGING PLUS E   [facility] ([facility])    [place] [address] [address]   [phone]            Feb 01, 2018  7:15 AM CST   Treatment with ADULT LODGING PLUS E   [facility] ([facility])    [facility] [address] [address]   [phone]              Who to contact     If you have questions or need follow up information about today's clinic visit or your schedule please contact [facility] directly at [phone].  Normal or non-critical lab and imaging results will be communicated to you by [facility], letter or phone within 4 business days after the clinic has received the results. If you do not hear from us within 7 days, please contact the clinic through [facility] or phone. If you have a critical or abnormal lab result, we will notify you by phone as soon as possible.  Submit refill requests through "[employer]t [employer] App" or call your pharmacy and they will forward the refill request to us. Please allow 3 business days for your refill to be completed.          Additional Information About Your Visit        "[employer] Fast [employer]t [employer] App" Information     "[employer] Simple[employer] Fast [employer]t [employer] App" lets you send messages to your doctor, view your test results, renew your prescriptions, schedule appointments and more. To sign  "up, go to [email or web address] . Click on \"Log in\" on the left side of the screen, which will take you to the Welcome page. Then click on \"Sign up Now\" on the right side of the page.     You will be asked to enter the access code listed below, as well as some personal information. Please follow the directions to create your username and password.     Your access code is: [email or web address]  Expires: 3/21/2018 10:09 AM     Your access code will  in 90 days. If you need help or a new code, please call your [facility] clinic or [phone].        Care EveryWhere ID     This is your Care EveryWhere ID. This could be used by other organizations to access your [facility] medical records  [id number]        Your Vitals Were     Pulse Temperature Respirations Pulse Oximetry BMI (Body Mass Index)       75 98.4  F (36.9  C) (Oral) 14 98% 24.29 kg/m2        Blood Pressure from Last 3 Encounters:   18 120/68   18 104/68   18 98/60    Weight from Last 3 Encounters:   18 141 lb 8 oz (64.2 kg)   18 136 lb 8 oz (61.9 kg)   18 135 lb (61.2 kg)              Today, you had the following     No orders found for display         Today's Medication Changes          These changes are accurate as of: 18 11:30 AM.  If you have any questions, ask your nurse or doctor.               These medicines have changed or have updated prescriptions.        Dose/Directions    buprenorphine HCl-naloxone HCl 4-1 MG per film   Commonly known as:  SUBOXONE   This may have changed:  when to take this   Used for:  Uncomplicated opioid dependence (H)   Changed by:  Mehdi Ross MD        Dose:  1 Film   Place 1 Film under the tongue 3 times daily   Quantity:  84 Film   Refills:  0            Where to get your medicines      Some of these will need a paper prescription and others can be bought over the counter.  Ask your nurse if you have questions.     Bring a paper prescription for each of these medications "     buprenorphine HCl-naloxone HCl 4-1 MG per film                Primary Care Provider Office Phone # Fax #    SilYarelis Barone [phone]       [facility] [address] [place] [address]        Equal Access to Services     LOUISE MARQUEZ : Hadii gaurav musa hadalfredoo Soomaali, waaxda luqadaha, qaybta kaalmada adeegyada, ismael erikain hayaaalcon bossmaryfatoumata melgar. So [facility] [phone].    ATENCIÓN: Si habla español, tiene a polo disposición servicios gratuitos de asistencia lingüística. [facility] [phone].    We comply with applicable federal civil rights laws and [place] laws. We do not discriminate on the basis of race, color, national origin, age, disability, sex, sexual orientation, or gender identity.            Thank you!     Thank you for choosing [facility]  for your care. Our goal is always to provide you with excellent care. Hearing back from our patients is one way we can continue to improve our services. Please take a few minutes to complete the written survey that you may receive in the mail after your visit with us. Thank you!             Your Updated Medication List - Protect others around you: Learn how to safely use, store and throw away your medicines at www.disposemymeds.org.          This list is accurate as of: 1/23/18 11:30 AM.  Always use your most recent med list.                   Brand Name Dispense Instructions for use Diagnosis    alum & mag hydroxide-simethicone 200-200-20 MG/5ML Susp suspension    MYLANTA/MAALOX     Take 30 mLs by mouth every 6 hours as needed for indigestion        buprenorphine HCl-naloxone HCl 4-1 MG per film    SUBOXONE    84 Film    Place 1 Film under the tongue 3 times daily    Uncomplicated opioid dependence (H)       DULoxetine 60 MG EC capsule    CYMBALTA    30 capsule    Take 1 capsule (60 mg) by mouth daily    Depression with suicidal ideation       escitalopram 20 MG tablet    LEXAPRO    30 tablet    Take  1 tablet (20 mg) by mouth daily    Sedative, hypnotic or anxiolytic dependence (H)       fluticasone 220 MCG/ACT Inhaler    FLOVENT HFA     Inhale 1 puff into the lungs 2 times daily        gabapentin 300 MG capsule    NEURONTIN    90 capsule    Take 1 capsule (300 mg) by mouth 3 times daily    Sedative, hypnotic or anxiolytic dependence (H)       guaiFENesin 20 mg/mL Soln solution    ROBITUSSIN     Take 10 mLs by mouth every 4 hours as needed for cough        hydrOXYzine 25 MG tablet    ATARAX    120 tablet    Take 1-2 tablets (25-50 mg) by mouth every 4 hours as needed for anxiety    Sedative, hypnotic or anxiolytic dependence (H)       IBUPROFEN PO      Take 400 mg by mouth every 6 hours as needed for moderate pain        loratadine 10 MG tablet    CLARITIN     Take 10 mg by mouth daily as needed for allergies        MELATONIN PO      Take 3 mg by mouth nightly as needed        multivitamin, therapeutic with minerals Tabs tablet     30 each    Take 1 tablet by mouth daily    Sedative, hypnotic or anxiolytic dependence (H)       phenol-menthol 14.5 MG lozenge      Place 1 lozenge inside cheek every 2 hours as needed for moderate pain        * QUETIAPINE FUMARATE PO      Take 50 mg by mouth 3 times daily        * QUEtiapine 25 MG tablet    SEROquel    90 tablet    Take 2 tablets (50 mg) by mouth 3 times daily    Depression with suicidal ideation       senna-docusate 8.6-50 MG per tablet    SENOKOT-S;PERICOLACE     Take 2 tablets by mouth daily as needed for constipation        traZODone 50 MG tablet    DESYREL    90 tablet    Take 1-3 tablets ( mg) by mouth At Bedtime    Sedative, hypnotic or anxiolytic dependence (H)       TYLENOL PO      Take 650 mg by mouth every 4 hours as needed for mild pain or fever        VENTOLIN  (90 BASE) MCG/ACT Inhaler   Generic drug:  albuterol      Inhale 1 puff into the lungs every 4 hours as needed for shortness of breath / dyspnea or wheezing        * Notice:  This  list has 2 medication(s) that are the same as other medications prescribed for you. Read the directions carefully, and ask your doctor or other care provider to review them with you.

## 2018-01-23 NOTE — TELEPHONE ENCOUNTER
PA Initiation    Medication: suboxone 4-1mg films- INITIATED  Insurance Company: [employer] - Phone [phone] Fax [phone]  Pharmacy Filling the Rx: [place], [place] - [address]  Filling Pharmacy Phone: [phone]  Filling Pharmacy Fax:    Start Date: 1/23/2018

## 2018-01-24 ENCOUNTER — HOSPITAL ENCOUNTER (OUTPATIENT)
Dept: BEHAVIORAL HEALTH | Facility: CLINIC | Age: 48
End: 2018-01-24
Attending: FAMILY MEDICINE
Payer: COMMERCIAL

## 2018-01-24 PROCEDURE — H2035 A/D TX PROGRAM, PER HOUR: HCPCS

## 2018-01-24 PROCEDURE — 10020000 ZZH LODGING PLUS FACILITY CHARGE ADULT

## 2018-01-24 PROCEDURE — H2035 A/D TX PROGRAM, PER HOUR: HCPCS | Mod: HQ

## 2018-01-24 NOTE — PROGRESS NOTES
"INDIVIDUAL SESSION SUMMARY    D) Met with client on 1/24/18 from 8:30-9:00. Therapist commented that client had changed her hair, had her make-up on and was nicely dressed for the session. Client stated that her outward appearance was a refection of her growing \"inner confidence\". Client reported feeling \"proud of herself\" as she spoke of a phone conversation she had with her ex BF. Client stated that the OFP her ex BF requested was not in force so she called him yesterday. Client stated \"I got a lot of things off my chest,\"  \"we agreed to move forward with our lives\" and \"I don't want to call him anymore\". Client spoke of feeling anxious as she is waiting to hear whether [facility] has room for her. Client spoke about \"not having a safe place to go\" if she cannot transfer directly from this treatment program to [place]. Therapist encouraged client to share her worries with her primary counselors, Marino and Linda. Client reported that her parents cannot attend the family program 1/29 due to her mom's shoulder surgery. Client spoke positively about her sessions with the , Pallavi, and stated that she has \"hope\" for her future. Client agreed to continue meeting with an individual therapist and asked for referrals.    I) Individual session with client. Provided client with verbal interventions including: validation, nurturing, support, redirection, and healthy boundary setting. Spent time in session practicing calm breath exercises.  A) Client appears to have experienced early attachment disruption, resulting in lack of trust, loss of safety, and symptoms of co-dependency. Client appears emotionally constricted and to lack skills for emotional regulation and stress management. Client appears to better understand the need to create a sober support network, a daily routine and a sense of purpose. Client tends towards self-defeating, self-sabotaging patterns by seeking validation from external sources " and not having healthy boundaries with her family.   P) No future sessions scheduled. Therapist gave client several therapist and psychiatry referrals to contact.  DANIEL Robert  1/24/2018

## 2018-01-24 NOTE — TELEPHONE ENCOUNTER
Prior Authorization Approval    Authorization Effective Date: 1/16/2018  Authorization Expiration Date: 7/16/2018  Medication: suboxone 4-1mg films- approved  Approved Dose/Quantity: 84 for 28  Reference #:     Insurance Company: [employer] - Phone [phone] Fax [phone]  Expected CoPay: $3.00     CoPay Card Available:      Foundation Assistance Needed:    Which Pharmacy is filling the prescription (Not needed for infusion/clinic administered): [facility] [place], [place] - [address]  Pharmacy Notified: Yes  Patient Notified: Yes

## 2018-01-25 ENCOUNTER — TELEPHONE (OUTPATIENT)
Dept: ADDICTION MEDICINE | Facility: CLINIC | Age: 48
End: 2018-01-25

## 2018-01-25 ENCOUNTER — HOSPITAL ENCOUNTER (OUTPATIENT)
Dept: BEHAVIORAL HEALTH | Facility: CLINIC | Age: 48
End: 2018-01-25
Attending: FAMILY MEDICINE
Payer: COMMERCIAL

## 2018-01-25 ENCOUNTER — OFFICE VISIT - HEALTHEAST (OUTPATIENT)
Dept: FAMILY MEDICINE | Facility: CLINIC | Age: 48
End: 2018-01-25

## 2018-01-25 DIAGNOSIS — F10.21 ALCOHOL DEPENDENCE IN REMISSION (H): ICD-10-CM

## 2018-01-25 DIAGNOSIS — F90.0 ATTENTION DEFICIT HYPERACTIVITY DISORDER (ADHD), PREDOMINANTLY INATTENTIVE TYPE: ICD-10-CM

## 2018-01-25 DIAGNOSIS — F32.4 MAJOR DEPRESSION IN PARTIAL REMISSION (H): ICD-10-CM

## 2018-01-25 DIAGNOSIS — L70.9 ACNE: ICD-10-CM

## 2018-01-25 PROCEDURE — H2035 A/D TX PROGRAM, PER HOUR: HCPCS | Mod: HQ

## 2018-01-25 PROCEDURE — 10020000 ZZH LODGING PLUS FACILITY CHARGE ADULT

## 2018-01-25 NOTE — PROGRESS NOTES
Name: Mae Holder  Date: 1/25/2018  Medical Record: [id number]    Envelope Number: [id number]    List of Contents (List each item separately in new row):   Cell phone w/ black & white stripe case    Admission:  I am responsible for any personal items that are not sent to the safe or pharmacy.  [facility] is not responsible for loss, theft or damage of any property in my possession.      Patient Signature:  ___________________________________________       Date/Time:__________________________    Staff Signature: __________________________________       Date/Time:__________________________    2nd Staff person, if patient is unable/unwilling to sign:      __________________________________________________________       Date/Time: __________________________      Discharge:  [facility] has returned all of my personal belongings:    Patient Signature: ________________________________________     Date/Time: ____________________________________    Staff Signature: ______________________________________     Date/Time:_____________________________________

## 2018-01-25 NOTE — PROGRESS NOTES
SUBJECTIVE:   Mae Holder is a 47 year old female who presents to clinic today for the following health issues:          ADDICTION MEDICINE NOTE:      PREVIOUS HISTORY AS BELOW    DOING WELL IN LODGING PLUS    LOOKING FOR EXTENDED CARE AFTER LODGING PLUS    MAY GO TO SOBER HOUSE OR [place]    HAVING BOYFRIEND ISSUES    SUBOXONE HELPS GREATLY BUT WOULD LIKE 12 MG      INITIAL VISIT    LODGING PLUS PATIENT    HERE TO DISCUSS MAT OPTIONS    SEVERAL YR HISTORY OF ADDICTION  ALCOHOL, OPIOIDS DRUGS OF CHOICE    TREATMENT AT [facility] 1 YR AGO  HISTORY OF SUCCESS WITH VIVITROL FOR ALCOHOL DEPENDENCE A FEW YRS AGO; TOOK IT FOR A YEAR    RECENT DETOX, THEN WENT TO THE RETREAT  DEVELOPED DEPRESSION THERE AND WAS HOSPITALIZED AT [facility] FOR A SHORT TIME  NOW IN LODGING PLUS    HAVING INTENSE CRAVING FOR ALCOHOL AND OPIOIDS  HISTORY OXYCODONE USE, HEROIN ONLY 1 TIME BUT LOVED IT    DISCUSSED PROS AND CONS OF SUBOXONE VS. VIVITROL  CONSIDERING GOING BACK TO THE RETREAT AFTER LODGING PLUS BUT CAN'T IF SHE IS ON SUBOXONE  REALIZES SUBOXONE RELIEVES CRAVING BETTER THAN NALTREXONE    ELECTS TO PROCEED WITH SUBOXONE     WILL BEGIN 4 MG BID     RE-CHECK 1 WEEK    Problem list and histories reviewed & adjusted, as indicated.  Additional history: as documented    Patient Active Problem List   Diagnosis     Sedative and hypnotic drug poisoning     Depression with suicidal ideation     Chemical dependency (H)     Past Surgical History:   Procedure Laterality Date     BUNIONECTOMY  2004, 2010     HYSTERECTOMY  07/2017    Partial       Social History   Substance Use Topics     Smoking status: Current Every Day Smoker     Packs/day: 1.00     Types: Cigarettes     Smokeless tobacco: Never Used     Alcohol use Not on file     Family History   Problem Relation Age of Onset     Lung Cancer Paternal Grandfather          Current Outpatient Prescriptions   Medication Sig Dispense Refill     buprenorphine HCl-naloxone HCl (SUBOXONE) 4-1  MG per film Place 1 Film under the tongue 3 times daily 84 Film 0     QUETIAPINE FUMARATE PO Take 50 mg by mouth 3 times daily       QUEtiapine (SEROQUEL) 25 MG tablet Take 2 tablets (50 mg) by mouth 3 times daily 90 tablet 1     albuterol (VENTOLIN HFA) 108 (90 BASE) MCG/ACT Inhaler Inhale 1 puff into the lungs every 4 hours as needed for shortness of breath / dyspnea or wheezing       fluticasone (FLOVENT HFA) 220 MCG/ACT Inhaler Inhale 1 puff into the lungs 2 times daily       Acetaminophen (TYLENOL PO) Take 650 mg by mouth every 4 hours as needed for mild pain or fever       IBUPROFEN PO Take 400 mg by mouth every 6 hours as needed for moderate pain       alum & mag hydroxide-simethicone (MYLANTA/MAALOX) 200-200-20 MG/5ML SUSP suspension Take 30 mLs by mouth every 6 hours as needed for indigestion       guaiFENesin (ROBITUSSIN) 20 mg/mL SOLN solution Take 10 mLs by mouth every 4 hours as needed for cough       phenol-menthol (CEPASTAT) 14.5 MG lozenge Place 1 lozenge inside cheek every 2 hours as needed for moderate pain       loratadine (CLARITIN) 10 MG tablet Take 10 mg by mouth daily as needed for allergies       senna-docusate (SENOKOT-S;PERICOLACE) 8.6-50 MG per tablet Take 2 tablets by mouth daily as needed for constipation       MELATONIN PO Take 3 mg by mouth nightly as needed       DULoxetine (CYMBALTA) 60 MG EC capsule Take 1 capsule (60 mg) by mouth daily 30 capsule 0     hydrOXYzine (ATARAX) 25 MG tablet Take 1-2 tablets (25-50 mg) by mouth every 4 hours as needed for anxiety 120 tablet 0     gabapentin (NEURONTIN) 300 MG capsule Take 1 capsule (300 mg) by mouth 3 times daily 90 capsule 0     escitalopram (LEXAPRO) 20 MG tablet Take 1 tablet (20 mg) by mouth daily 30 tablet 0     traZODone (DESYREL) 50 MG tablet Take 1-3 tablets ( mg) by mouth At Bedtime 90 tablet 0     multivitamin, therapeutic with minerals (THERA-VIT-M) TABS tablet Take 1 tablet by mouth daily 30 each 0     No Known  Allergies  Labs reviewed in EPIC      Reviewed and updated as needed this visit by clinical staffTobacco       Reviewed and updated as needed this visit by Provider         ROS:      OBJECTIVE:     /68  Pulse 75  Temp 98.4  F (36.9  C) (Oral)  Resp 14  Wt 141 lb 8 oz (64.2 kg)  SpO2 98%  BMI 24.29 kg/m2  Body mass index is 24.29 kg/(m^2).     ROS:  Constitutional, HEENT, cardiovascular, pulmonary, gi and gu systems are negative, except as otherwise noted.    /68  Pulse 75  Temp 98.4  F (36.9  C) (Oral)  Resp 14  Wt 141 lb 8 oz (64.2 kg)  SpO2 98%  BMI 24.29 kg/m2  EXAM:  GENERAL APPEARANCE: healthy, alert and no distress  EYES: Eyes grossly normal to inspection, PERRL and conjunctivae and sclerae normal  NEURO: Normal strength and tone, mentation intact and speech normal  PSYCH: mentation appears normal and affect normal/bright  MENTAL STATUS EXAM:  Appearance/Behavior: No apparent distress and Casually groomed  Speech: Normal  Mood/Affect: normal affect  Insight: Adequate          ASSESSMENT:   OPIOID DEPENDENCE   ALCOHOL DEPENDENCE     PLAN:       ICD-10-CM    1. Uncomplicated opioid dependence (H) F11.20 buprenorphine HCl-naloxone HCl (SUBOXONE) 4-1 MG per film           MEDICATIONS:   Orders Placed This Encounter   Medications     buprenorphine HCl-naloxone HCl (SUBOXONE) 4-1 MG per film     Sig: Place 1 Film under the tongue 3 times daily     Dispense:  84 Film     Refill:  0          - Continue other medications without change  FUTURE APPOINTMENTS:       - Follow-up visit in 4 WEEKS    Mehdi Ross MD  [facility]

## 2018-01-25 NOTE — TELEPHONE ENCOUNTER
Incoming call from pt's primary care clinic stating that pt was prescribed Ritalin by her PCP today. FYI for Dr. Ross, per pt's request.     Heidi Monte
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
## 2018-01-26 ENCOUNTER — HOSPITAL ENCOUNTER (OUTPATIENT)
Dept: BEHAVIORAL HEALTH | Facility: CLINIC | Age: 48
End: 2018-01-26
Attending: FAMILY MEDICINE
Payer: COMMERCIAL

## 2018-01-26 PROCEDURE — 10020000 ZZH LODGING PLUS FACILITY CHARGE ADULT

## 2018-01-26 PROCEDURE — H2035 A/D TX PROGRAM, PER HOUR: HCPCS | Mod: HQ

## 2018-01-26 NOTE — PROGRESS NOTES
Name: Mae Holder  Date: 1/26/2018  Medical Record: [id number]    Envelope Number: [id number]    List of Contents (List each item separately in new row):   Cell phone    Admission:  I am responsible for any personal items that are not sent to the safe or pharmacy.  [facility] is not responsible for loss, theft or damage of any property in my possession.      Patient Signature:  ___________________________________________       Date/Time:__________________________    Staff Signature: __________________________________       Date/Time:__________________________    2nd Staff person, if patient is unable/unwilling to sign:      __________________________________________________________       Date/Time: __________________________      Discharge:  [facility] has returned all of my personal belongings:    Patient Signature: ________________________________________     Date/Time: ____________________________________    Staff Signature: ______________________________________     Date/Time:_____________________________________    
No

## 2018-01-27 ENCOUNTER — HOSPITAL ENCOUNTER (OUTPATIENT)
Dept: BEHAVIORAL HEALTH | Facility: CLINIC | Age: 48
End: 2018-01-27
Attending: FAMILY MEDICINE
Payer: COMMERCIAL

## 2018-01-27 PROCEDURE — 10020000 ZZH LODGING PLUS FACILITY CHARGE ADULT

## 2018-01-27 PROCEDURE — H2035 A/D TX PROGRAM, PER HOUR: HCPCS | Mod: HQ

## 2018-01-28 ENCOUNTER — HOSPITAL ENCOUNTER (OUTPATIENT)
Dept: BEHAVIORAL HEALTH | Facility: CLINIC | Age: 48
End: 2018-01-28
Attending: FAMILY MEDICINE
Payer: COMMERCIAL

## 2018-01-28 PROCEDURE — 10020000 ZZH LODGING PLUS FACILITY CHARGE ADULT

## 2018-01-28 PROCEDURE — H2035 A/D TX PROGRAM, PER HOUR: HCPCS | Mod: HQ

## 2018-01-28 NOTE — PROGRESS NOTES
Nursing Discharge Planning Meeting    Writer completed discharge planning meeting with patient. Discharge is planned for Friday, February 2nd pending getting a plan established for Suboxone and a facility that allows it until able to taper off.    Discussed appropriate follow up care to manage suboxone, mental health,  and to obtain medication refills. Patient given a copy of their current medications for reference. Questions answered and patient verbalized understanding of post-discharge follow up plan.  Patient to schedule an appointment with Dr. Cody CASTAÑEDA to discuss suboxone as she is interested in tapering off the medication because [facility] does not allow it on premises. Also needs to establish mental health services (therapy) after discharge once she knows where she is going to be living.    Continue to support patient in discharge planning as needed to assure appropriate continuity of care.     Tobacco Cessation  Patient participated in the nicotine replacement therapy for tobacco cessation or reduction during their treatment programming: No    The patient was provided with community resources for follow-up to continue tobacco cessation support once in the community. Also the patient was encoruaged to discuss their tobacco cessation efforts with the primary care provider.

## 2018-01-29 ENCOUNTER — TELEPHONE (OUTPATIENT)
Dept: ADDICTION MEDICINE | Facility: CLINIC | Age: 48
End: 2018-01-29

## 2018-01-29 ENCOUNTER — HOSPITAL ENCOUNTER (OUTPATIENT)
Dept: BEHAVIORAL HEALTH | Facility: CLINIC | Age: 48
End: 2018-01-29
Attending: FAMILY MEDICINE
Payer: COMMERCIAL

## 2018-01-29 PROCEDURE — 10020000 ZZH LODGING PLUS FACILITY CHARGE ADULT

## 2018-01-29 PROCEDURE — H2035 A/D TX PROGRAM, PER HOUR: HCPCS | Mod: HQ

## 2018-01-29 NOTE — TELEPHONE ENCOUNTER
Incoming call from pt, pt's currently at [facility] and she will be discharging on 2/2 to a treatment center ([facility]) and pt stated they don't accept pt's with Subx, pt want to discuss with a nurse about  tapering off of Subx.   Pt contact info: [phone]   counselor Marino - [phone]       Ortiz Ortiz

## 2018-01-29 NOTE — TELEPHONE ENCOUNTER
As I told patient before - I do not advise tapering off Suboxone   If she wants to be off by 2/2 I would take 4 mg per day for 4 days but she is likely to have withdrawal   Best come up with a plan other than the [place]

## 2018-01-30 ENCOUNTER — HOSPITAL ENCOUNTER (OUTPATIENT)
Dept: BEHAVIORAL HEALTH | Facility: CLINIC | Age: 48
End: 2018-01-30
Attending: FAMILY MEDICINE
Payer: COMMERCIAL

## 2018-01-30 PROCEDURE — 10020000 ZZH LODGING PLUS FACILITY CHARGE ADULT

## 2018-01-30 PROCEDURE — H2035 A/D TX PROGRAM, PER HOUR: HCPCS | Mod: HQ

## 2018-01-30 NOTE — TELEPHONE ENCOUNTER
Unable to reach the patient at either the listed home, cell or work numbers.  LVM at  requesting call back from either staff or the patient to discuss Dr. Ross's recommendation. Renetta Go RN January 30, 2018 11:06 AM

## 2018-01-31 ENCOUNTER — HOSPITAL ENCOUNTER (OUTPATIENT)
Dept: BEHAVIORAL HEALTH | Facility: CLINIC | Age: 48
End: 2018-01-31
Attending: FAMILY MEDICINE
Payer: COMMERCIAL

## 2018-01-31 DIAGNOSIS — R45.851 DEPRESSION WITH SUICIDAL IDEATION: ICD-10-CM

## 2018-01-31 DIAGNOSIS — F13.20 SEDATIVE, HYPNOTIC OR ANXIOLYTIC DEPENDENCE (H): ICD-10-CM

## 2018-01-31 DIAGNOSIS — F32.A DEPRESSION WITH SUICIDAL IDEATION: ICD-10-CM

## 2018-01-31 PROCEDURE — 10020000 ZZH LODGING PLUS FACILITY CHARGE ADULT

## 2018-01-31 PROCEDURE — H2035 A/D TX PROGRAM, PER HOUR: HCPCS | Mod: HQ

## 2018-01-31 RX ORDER — DULOXETIN HYDROCHLORIDE 60 MG/1
60 CAPSULE, DELAYED RELEASE ORAL DAILY
Qty: 30 CAPSULE | Refills: 0 | Status: SHIPPED | OUTPATIENT
Start: 2018-01-31 | End: 2018-03-13

## 2018-01-31 RX ORDER — MULTIPLE VITAMINS W/ MINERALS TAB 9MG-400MCG
1 TAB ORAL DAILY
Qty: 30 EACH | Refills: 0 | Status: SHIPPED | OUTPATIENT
Start: 2018-01-31 | End: 2018-03-13

## 2018-01-31 RX ORDER — TRAZODONE HYDROCHLORIDE 50 MG/1
50-150 TABLET, FILM COATED ORAL AT BEDTIME
Qty: 90 TABLET | Refills: 0 | Status: SHIPPED | OUTPATIENT
Start: 2018-01-31
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
## 2018-01-31 NOTE — TELEPHONE ENCOUNTER
Called [facility] nurse to convey message below from Dr Ross, no answer, left VM requesting call back. Called and informed counselor listed below of message from provider.  Selena Ignacio RN

## 2018-01-31 NOTE — TELEPHONE ENCOUNTER
Called and discussed with [facility] nurse Adriana, pt will be staying an additional 5 days at [facility] to assist with taper. Taper discussed with patient.  Selena Ignacio RN

## 2018-02-01 ENCOUNTER — HOSPITAL ENCOUNTER (OUTPATIENT)
Dept: BEHAVIORAL HEALTH | Facility: CLINIC | Age: 48
End: 2018-02-01
Attending: FAMILY MEDICINE
Payer: COMMERCIAL

## 2018-02-01 PROCEDURE — 10020000 ZZH LODGING PLUS FACILITY CHARGE ADULT

## 2018-02-01 PROCEDURE — H2035 A/D TX PROGRAM, PER HOUR: HCPCS | Mod: HQ
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
## 2018-02-02 ENCOUNTER — OFFICE VISIT (OUTPATIENT)
Dept: FAMILY MEDICINE | Facility: CLINIC | Age: 48
End: 2018-02-02
Payer: COMMERCIAL

## 2018-02-02 ENCOUNTER — HOSPITAL ENCOUNTER (OUTPATIENT)
Dept: BEHAVIORAL HEALTH | Facility: CLINIC | Age: 48
End: 2018-02-02
Attending: FAMILY MEDICINE
Payer: COMMERCIAL

## 2018-02-02 VITALS
DIASTOLIC BLOOD PRESSURE: 64 MMHG | TEMPERATURE: 97.4 F | OXYGEN SATURATION: 100 % | HEART RATE: 67 BPM | BODY MASS INDEX: 23.52 KG/M2 | WEIGHT: 137 LBS | RESPIRATION RATE: 18 BRPM | SYSTOLIC BLOOD PRESSURE: 112 MMHG

## 2018-02-02 DIAGNOSIS — R60.0 LOCALIZED EDEMA: Primary | ICD-10-CM

## 2018-02-02 DIAGNOSIS — F17.200 TOBACCO DEPENDENCE SYNDROME: ICD-10-CM

## 2018-02-02 DIAGNOSIS — R06.2 WHEEZING: ICD-10-CM

## 2018-02-02 DIAGNOSIS — K59.01 SLOW TRANSIT CONSTIPATION: ICD-10-CM

## 2018-02-02 DIAGNOSIS — F19.20 CHEMICAL DEPENDENCY (H): ICD-10-CM

## 2018-02-02 PROCEDURE — 99214 OFFICE O/P EST MOD 30 MIN: CPT | Performed by: NURSE PRACTITIONER

## 2018-02-02 PROCEDURE — 10020000 ZZH LODGING PLUS FACILITY CHARGE ADULT

## 2018-02-02 PROCEDURE — H2035 A/D TX PROGRAM, PER HOUR: HCPCS | Mod: HQ

## 2018-02-02 PROCEDURE — 36415 COLL VENOUS BLD VENIPUNCTURE: CPT | Performed by: NURSE PRACTITIONER

## 2018-02-02 PROCEDURE — 80053 COMPREHEN METABOLIC PANEL: CPT | Performed by: NURSE PRACTITIONER

## 2018-02-02 RX ORDER — POLYETHYLENE GLYCOL 3350 17 G/17G
1 POWDER, FOR SOLUTION ORAL DAILY
Qty: 510 G | Refills: 1 | Status: SHIPPED | OUTPATIENT
Start: 2018-02-02

## 2018-02-02 NOTE — MR AVS SNAPSHOT
After Visit Summary   2/2/2018    Mae Holder    MRN: [id number]           Patient Information     Date Of Birth          1970        Visit Information        Provider Department      2/2/2018 9:00 AM Regi Le APRN CNP [facility]        Today's Diagnoses     Slow transit constipation    -  1    Localized edema          Care Instructions    We will check lab today and let you know results  Start Miralax once a day for constipation  Drink plenty of water  F/u with Dr Ross for Suboxone  Try rescue inhaler for wheezing          Follow-ups after your visit        Your next 10 appointments already scheduled     Feb 02, 2018  9:30 AM CST   Treatment with [facility]   [facility]    [address] [place] [address]   [phone]            Feb 03, 2018  9:30 AM CST   Treatment with [facility]ULT [facility]   [facility] ([facility])    [address] [place] [address]   [phone]            Feb 04, 2018  9:30 AM CST   Treatment with [facility]ULT [facility]   [facility] ([facility])    [address] [place] [address]   [phone]            Feb 05, 2018  9:30 AM CST   Treatment with [facility]ULT [facility]   [facility] ([facility])    [address] [place] [address]   [phone]            Feb 06, 2018  9:30 AM CST   Treatment with [facility]ULT [facility]   [facility] ([facility])    [address] [place] [address]   [phone]            Feb 13, 2018 10:30 AM CST   Return Visit with Mehdi Ross MD   [facility]  "([facility])    [address]  [address]  [facility] [id number] [phone]              Who to contact     If you have questions or need follow up information about today's clinic visit or your schedule please contact [facility] directly at [phone].  Normal or non-critical lab and imaging results will be communicated to you by MyChart, letter or phone within 4 business days after the clinic has received the results. If you do not hear from us within 7 days, please contact the clinic through MyChart or phone. If you have a critical or abnormal lab result, we will notify you by phone as soon as possible.  Submit refill requests through [employer] or call your pharmacy and they will forward the refill request to us. Please allow 3 business days for your refill to be completed.          Additional Information About Your Visit        [employer] Information     [employer] lets you send messages to your doctor, view your test results, renew your prescriptions, schedule appointments and more. To sign up, go to [email or web address] . Click on \"Log in\" on the left side of the screen, which will take you to the Welcome page. Then click on \"Sign up Now\" on the right side of the page.     You will be asked to enter the access code listed below, as well as some personal information. Please follow the directions to create your username and password.     Your access code is: [email or web address]  Expires: 3/21/2018 10:09 AM     Your access code will  in 90 days. If you need help or a new code, please call your [place] clinic or [phone].        Care EveryWhere ID     This is your Care EveryWhere ID. This could be used by other organizations to access your [facility] medical records  [id number]        Your Vitals Were     Pulse Temperature Respirations Pulse Oximetry BMI (Body Mass Index)       67 97.4  F (36.3  C) (Oral) 18 100% 23.52 kg/m2        Blood " Pressure from Last 3 Encounters:   02/02/18 112/64   01/23/18 120/68   01/16/18 104/68    Weight from Last 3 Encounters:   02/02/18 137 lb (62.1 kg)   01/23/18 141 lb 8 oz (64.2 kg)   01/16/18 136 lb 8 oz (61.9 kg)              We Performed the Following     Comprehensive metabolic panel (BMP + Alb, Alk Phos, ALT, AST, Total. Bili, TP)          Today's Medication Changes          These changes are accurate as of 2/2/18  9:24 AM.  If you have any questions, ask your nurse or doctor.               Start taking these medicines.        Dose/Directions    polyethylene glycol powder   Commonly known as:  MIRALAX   Used for:  Slow transit constipation   Started by:  Regi Le APRN CNP        Dose:  1 capful   Take 17 g (1 capful) by mouth daily   Quantity:  510 g   Refills:  1            Where to get your medicines      These medications were sent to [facility] [address]  [address] [address]     Phone:  [phone]     polyethylene glycol powder                Primary Care Provider Office Phone # Fax #    SilYarelis Barone [phone]       [facility] [address] [place] [address]        Equal Access to Services     LOUISE MARQUEZ AH: Hadii gaurav musa hadasho Soomaali, waaxda luqadaha, qaybta kaalmada adeegyada, ismael strong haykeesha melgar. So [facility] [phone].    ATENCIÓN: Si habla español, tiene a polo disposición servicios gratuitos de asistencia lingüística. Llame al [phone].    We comply with applicable federal civil rights laws and [place] laws. We do not discriminate on the basis of race, color, national origin, age, disability, sex, sexual orientation, or gender identity.            Thank you!     Thank you for choosing [facility]  for your care. Our goal is always to provide you with excellent care. Hearing back from our patients is one way we can continue to  improve our services. Please take a few minutes to complete the written survey that you may receive in the mail after your visit with us. Thank you!             Your Updated Medication List - Protect others around you: Learn how to safely use, store and throw away your medicines at www.disposemymeds.org.          This list is accurate as of 2/2/18  9:24 AM.  Always use your most recent med list.                   Brand Name Dispense Instructions for use Diagnosis    buprenorphine HCl-naloxone HCl 4-1 MG per film    SUBOXONE    84 Film    Place 1 Film under the tongue 3 times daily    Uncomplicated opioid dependence (H)       DULoxetine 60 MG EC capsule    CYMBALTA    30 capsule    Take 1 capsule (60 mg) by mouth daily    Depression with suicidal ideation       escitalopram 20 MG tablet    LEXAPRO    30 tablet    Take 1 tablet (20 mg) by mouth daily    Sedative, hypnotic or anxiolytic dependence (H)       fluticasone 220 MCG/ACT Inhaler    FLOVENT HFA     Inhale 1 puff into the lungs 2 times daily        gabapentin 300 MG capsule    NEURONTIN    90 capsule    Take 1 capsule (300 mg) by mouth 3 times daily    Sedative, hypnotic or anxiolytic dependence (H)       hydrOXYzine 25 MG tablet    ATARAX    120 tablet    Take 1-2 tablets (25-50 mg) by mouth every 4 hours as needed for anxiety    Sedative, hypnotic or anxiolytic dependence (H)       multivitamin, therapeutic with minerals Tabs tablet     30 each    Take 1 tablet by mouth daily    Sedative, hypnotic or anxiolytic dependence (H)       polyethylene glycol powder    MIRALAX    510 g    Take 17 g (1 capful) by mouth daily    Slow transit constipation       * QUETIAPINE FUMARATE PO      Take 50 mg by mouth 3 times daily        * QUEtiapine 25 MG tablet    SEROquel    90 tablet    Take 2 tablets (50 mg) by mouth 3 times daily    Depression with suicidal ideation       traZODone 50 MG tablet    DESYREL    90 tablet    Take 1-3 tablets ( mg) by mouth At Bedtime     Sedative, hypnotic or anxiolytic dependence (H)       VENTOLIN  (90 BASE) MCG/ACT Inhaler   Generic drug:  albuterol      Inhale 1 puff into the lungs every 4 hours as needed for shortness of breath / dyspnea or wheezing        * Notice:  This list has 2 medication(s) that are the same as other medications prescribed for you. Read the directions carefully, and ask your doctor or other care provider to review them with you.

## 2018-02-02 NOTE — PROGRESS NOTES
"Chief Complaint   Patient presents with     Edema     bilateral Ankles, started 2 days ago       Initial /64  Pulse 67  Temp 97.4  F (36.3  C) (Oral)  Resp 18  Wt 137 lb (62.1 kg)  SpO2 100%  BMI 23.52 kg/m2 Estimated body mass index is 23.52 kg/(m^2) as calculated from the following:    Height as of 12/26/17: 5' 4\" (1.626 m).    Weight as of this encounter: 137 lb (62.1 kg).  Medication Reconciliation: complete   Cherelle Ramirez MA    "

## 2018-02-02 NOTE — PATIENT INSTRUCTIONS
We will check lab today and let you know results  Start Miralax once a day for constipation  Drink plenty of water  F/u with Dr Ross for Suboxone  Try rescue inhaler for wheezing

## 2018-02-02 NOTE — PROGRESS NOTES
SUBJECTIVE:                                                    Mae Holder is a 47 year old female with a history of opiate and alcohol use disorders, depression and asthma who presents to clinic today for the following health issues:  [facility], [place]: N/A    Patient is in treatment at [facility]. Drugs of choice are opiates and alcohol. States she plans to go to a place called the [facility] in [place] after treatment for thirty days. In order to do that she has to be off of Suboxone so she states she is weaning herself off of it, she is down to 4 mg po daily from 12 mg. Reports anxiety and night sweats but denies other withdrawal symptoms.      EDEMA/Lower Extremity      Duration: 2-3 days    Description (location/character/radiation): swelling to bilateral ankles    Intensity:  moderate    Accompanying signs and symptoms: Denies CP, SOB, any hx of heart failure and kidney issues. Hx of alcoholism but last LFTs were normal. Denies alcohol or drug use for about six weeks    History (similar episodes/previous evaluation): Never    Precipitating or alleviating factors: Recently gained 7 lbs    Wants to lose weight.     Therapies tried and outcome: None      Mental Health Follow up Depression and anxiety    Status since last visit: fair. On lexapro and Cymbalta. Stopped Seroquel due to weight gain of 7 lbs. States mood has been stable. Recent stay for suicidal ideations about one month ago    See PHQ-9 for current symptoms.  Other associated symptoms: None    Complicating factors: chemical dependency.  Significant life event:  No   Current substance abuse:  None  Anxiety or Panic symptoms:  No    Sleep - good  Appetite - good  Exercise - denies    Smoking - 1 ppd  Alcohol - history of  Street drugs - history of  Marijuana - denies  Caffeine - denies    PHQ-9  English PHQ-9   Any Language           Hx of bladder prolapse, s/p hysterectomy in July 2017      Problems taking medications regularly: No    Medication side effects:  none    Diet: regular (no restrictions)    Social History   Substance Use Topics     Smoking status: Current Every Day Smoker     Packs/day: 1.00     Types: Cigarettes     Smokeless tobacco: Never Used     Alcohol use Not on file        Problem list and histories reviewed & adjusted, as indicated.  Additional history: as documented    Patient Active Problem List   Diagnosis     Sedative and hypnotic drug poisoning     Depression with suicidal ideation     Chemical dependency (H)     Past Surgical History:   Procedure Laterality Date     BUNIONECTOMY  2004, 2010     HYSTERECTOMY  07/2017    Partial       Social History   Substance Use Topics     Smoking status: Current Every Day Smoker     Packs/day: 1.00     Types: Cigarettes     Smokeless tobacco: Never Used     Alcohol use Not on file     Family History   Problem Relation Age of Onset     Lung Cancer Paternal Grandfather            Current Outpatient Prescriptions   Medication Sig Dispense Refill     DULoxetine (CYMBALTA) 60 MG EC capsule Take 1 capsule (60 mg) by mouth daily 30 capsule 0     multivitamin, therapeutic with minerals (THERA-VIT-M) TABS tablet Take 1 tablet by mouth daily 30 each 0     traZODone (DESYREL) 50 MG tablet Take 1-3 tablets ( mg) by mouth At Bedtime 90 tablet 0     buprenorphine HCl-naloxone HCl (SUBOXONE) 4-1 MG per film Place 1 Film under the tongue 3 times daily 84 Film 0     QUETIAPINE FUMARATE PO Take 50 mg by mouth 3 times daily       QUEtiapine (SEROQUEL) 25 MG tablet Take 2 tablets (50 mg) by mouth 3 times daily 90 tablet 1     albuterol (VENTOLIN HFA) 108 (90 BASE) MCG/ACT Inhaler Inhale 1 puff into the lungs every 4 hours as needed for shortness of breath / dyspnea or wheezing       fluticasone (FLOVENT HFA) 220 MCG/ACT Inhaler Inhale 1 puff into the lungs 2 times daily       hydrOXYzine (ATARAX) 25 MG tablet Take 1-2 tablets (25-50 mg) by mouth every 4 hours as needed for anxiety 120 tablet 0     gabapentin (NEURONTIN) 300  MG capsule Take 1 capsule (300 mg) by mouth 3 times daily 90 capsule 0     escitalopram (LEXAPRO) 20 MG tablet Take 1 tablet (20 mg) by mouth daily 30 tablet 0     No Known Allergies  Recent Labs   Lab Test  12/26/17   1958  12/15/17   1206   LDL   --   83   HDL   --   98   TRIG   --   76   ALT   --   16   CR  0.74  0.70   GFRESTIMATED  84  >90   GFRESTBLACK  >90  >90   POTASSIUM  3.7  4.0   TSH  0.69  0.30*      BP Readings from Last 3 Encounters:   01/23/18 120/68   01/16/18 104/68   01/09/18 98/60    Wt Readings from Last 3 Encounters:   01/23/18 141 lb 8 oz (64.2 kg)   01/16/18 136 lb 8 oz (61.9 kg)   01/09/18 135 lb (61.2 kg)        Labs reviewed in EPIC  Problem list, Medication list, Allergies, and Medical/Social/Surgical histories reviewed in [facility] and updated as appropriate.     ROS: Constitutional, neuro, ENT, endocrine, pulmonary, cardiac, gastrointestinal, genitourinary, musculoskeletal, integument and psychiatric systems are negative, except as otherwise noted above in the HPI.       OBJECTIVE:                                                    There were no vitals taken for this visit.  There is no height or weight on file to calculate BMI.  GENERAL: healthy, alert, well nourished, well hydrated, no distress  EYES: Eyes grossly normal to inspection, extraocular movements - intact, and PERRL  HENT: ear canals- normal; TMs- normal; Nose- normal; Mouth- no ulcers, no lesions  NECK: no tenderness, no adenopathy, no asymmetry, no masses, no stiffness; thyroid- normal to palpation  RESP: lungs clear to auscultation - no rales, no rhonchi, wheezes RUL and RLL  CV: regular rates and rhythm, normal S1 S2, no S3 or S4 and no murmur, no click or rub - no homans or cords. Very mild trace edema to BLE, no pitting  ABDOMEN: soft, no tenderness, no  hepatosplenomegaly, no masses, normal bowel sounds  MS: extremities- no gross deformities noted, no edema  SKIN: no suspicious lesions, no rashes  NEURO: strength and  "tone- normal, sensory exam- grossly normal, mentation- intact, speech- normal, reflexes- symmetric  Non focal no aphasia. No facial asymmetry. Finger to nose, rapid alteration, finger thumb opposition, heel knee shin are normal.  BACK: no CVA tenderness, no paralumbar tenderness  LYMPHATICS: ant. cervical- normal, post. cervical- normal, axillary- normal, supraclavicular- normal, inguinal- normal  Mental Status Assessment:  Appearance:   Appropriate   Eye Contact:   Good   Psychomotor Behavior: Normal   Attitude:   Cooperative   Orientation:   All  Speech   Rate / Production: Normal    Volume:  Normal   Mood:    Normal  Affect:    Appropriate   Thought Content:  Clear   Thought Form:  Coherent  Logical   Insight:    Good   Attention Span and Concentration:  limited  Recent and Remote Memory:  intact  Fund of Knowledge: appropriate  Muscle Strength and Tone: normal   Suicidal Ideation: reports no thoughts, no intention  Hallucination: No  Paranoid-No  Manic-No  Panic-No  Self harm-No      See [facility] notes     Diagnostic Test Results:  No results found for this or any previous visit (from the past 24 hour(s)).     ASSESSMENT/PLAN:                                                    (R60.0) Localized edema  (primary encounter diagnosis)  Comment: Very minor edema to bilateral ankles, clinically insignificant  Very persistent in requesting a \"water pill\" and suspicion raised for potential eating disorder/obsession with weight  A diuretic is not appropriate in this case  Will run CMP to check on renal and hepatic function  No evidence of acute VTE  Encouraged her to push oral fluids and limit high sodium foods/  Plan: Comprehensive metabolic panel (BMP + Alb, Alk         Phos, ALT, AST, Total. Bili, TP)            (K59.01) Slow transit constipation  Comment: reporting constipation  Initially refusing Colace, \"it does not work.\"  Agrees to try Miralax  Again, suspicion raised for potential laxative seeking behavior.  Plan: " polyethylene glycol (MIRALAX) powder            (R06.2) Wheezing  Comment: To RUL and RLL  History of asthma  No respiratory distress  Plan: Recommended routine use of rescue inhaler  Smoking cessation advised    (F19.20) Chemical dependency (H)  Comment: In treatment  Reports weaning off of suboxone  Plan: Encouraged to follow up with Dr. Ross    (F17.200) Tobacco dependence syndrome  Comment: Cessation advised  Plan: As above        Patient Instructions:  Patient Instructions   We will check lab today and let you know results  Start Miralax once a day for constipation  Drink plenty of water  F/u with Dr Ross for Suboxone  Try rescue inhaler for wheezing                JACE Isbell [facility]

## 2018-02-03 ENCOUNTER — HOSPITAL ENCOUNTER (OUTPATIENT)
Dept: BEHAVIORAL HEALTH | Facility: CLINIC | Age: 48
End: 2018-02-03
Attending: FAMILY MEDICINE
Payer: COMMERCIAL

## 2018-02-03 PROCEDURE — H2035 A/D TX PROGRAM, PER HOUR: HCPCS | Mod: HQ

## 2018-02-03 PROCEDURE — 10020000 ZZH LODGING PLUS FACILITY CHARGE ADULT

## 2018-02-03 RX ORDER — TRETINOIN 0.1 MG/G
GEL TOPICAL AT BEDTIME
COMMUNITY

## 2018-02-03 NOTE — PROGRESS NOTES
Name: Mae Holder  Date: 2/3/2018  Medical Record: [id number]    Envelope Number: [id number]    List of Contents (List each item separately in new row):     Methylphenidate HCL 5mg Tabs (60 tablets)    Admission:  I am responsible for any personal items that are not sent to the safe or pharmacy.  [facility] is not responsible for loss, theft or damage of any property in my possession.      Patient Signature:  ___________________________________________       Date/Time:__________________________    Staff Signature: __________________________________       Date/Time:__________________________    2nd Staff person, if patient is unable/unwilling to sign:      __________________________________________________________       Date/Time: __________________________      Discharge:  [facility] has returned all of my personal belongings:    Patient Signature: ________________________________________     Date/Time: ____________________________________    Staff Signature: ______________________________________     Date/Time:_____________________________________

## 2018-02-04 ENCOUNTER — HOSPITAL ENCOUNTER (OUTPATIENT)
Dept: BEHAVIORAL HEALTH | Facility: CLINIC | Age: 48
End: 2018-02-04
Attending: FAMILY MEDICINE
Payer: COMMERCIAL

## 2018-02-04 LAB
ALBUMIN SERPL-MCNC: 3.9 G/DL (ref 3.4–5)
ALP SERPL-CCNC: 64 U/L (ref 40–150)
ALT SERPL W P-5'-P-CCNC: 21 U/L (ref 0–50)
ANION GAP SERPL CALCULATED.3IONS-SCNC: 5 MMOL/L (ref 3–14)
AST SERPL W P-5'-P-CCNC: 29 U/L (ref 0–45)
BILIRUB SERPL-MCNC: 0.4 MG/DL (ref 0.2–1.3)
BUN SERPL-MCNC: 9 MG/DL (ref 7–30)
CALCIUM SERPL-MCNC: 9.2 MG/DL (ref 8.5–10.1)
CHLORIDE SERPL-SCNC: 107 MMOL/L (ref 94–109)
CO2 SERPL-SCNC: 28 MMOL/L (ref 20–32)
CREAT SERPL-MCNC: 0.81 MG/DL (ref 0.52–1.04)
GFR SERPL CREATININE-BSD FRML MDRD: 76 ML/MIN/1.7M2
GLUCOSE SERPL-MCNC: 86 MG/DL (ref 70–99)
POTASSIUM SERPL-SCNC: 4.4 MMOL/L (ref 3.4–5.3)
PROT SERPL-MCNC: 6.6 G/DL (ref 6.8–8.8)
SODIUM SERPL-SCNC: 140 MMOL/L (ref 133–144)

## 2018-02-04 PROCEDURE — 10020000 ZZH LODGING PLUS FACILITY CHARGE ADULT

## 2018-02-04 PROCEDURE — H2035 A/D TX PROGRAM, PER HOUR: HCPCS | Mod: HQ

## 2018-02-04 NOTE — PROGRESS NOTES
Talked with pt, RE:yesterday, she thinks it maybe part of withdrawal from suboxone. Messaged nurse about possibility of pt being on a different medication. She will get in touch with provider. Pt is not tearful today, reports she will begin packing for d/c when she is asked to straighten up her room.

## 2018-02-04 NOTE — PROGRESS NOTES
"LATE ENTRY FOR 2/3/18:  Pt came to counselor, reporting she is irritable and has snapped at 2 peers. Discussed self-soothing exercises-especially deep breathing.She is tearful sharing she feels like she has a \"whole\" and does not know what to do. Talked about she is currently safe, can talk with staff. Talk with  peers who have had similar experiences and reframe negative thoughts to positive. Pt seems anxious about moving forward, discharging in several days. Pt practice mindfulness, staying in the moment and see psychotherapist, Bren Barbosa on 2/5/18.    "

## 2018-02-05 ENCOUNTER — HOSPITAL ENCOUNTER (OUTPATIENT)
Dept: BEHAVIORAL HEALTH | Facility: CLINIC | Age: 48
End: 2018-02-05
Attending: FAMILY MEDICINE
Payer: COMMERCIAL

## 2018-02-05 PROCEDURE — H2035 A/D TX PROGRAM, PER HOUR: HCPCS | Mod: HQ

## 2018-02-05 PROCEDURE — 10020000 ZZH LODGING PLUS FACILITY CHARGE ADULT

## 2018-02-05 PROCEDURE — H2035 A/D TX PROGRAM, PER HOUR: HCPCS
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
## 2018-02-05 NOTE — PROGRESS NOTES
"Spoke with patient today as she was tearful and appeared disheveled. She reported feeling \"depressed\" and began crying when asked what was on her mind. Stressed about transition to new tx setting. Also states she may need to resume her suboxone. Encouraged patient to resume suboxone as this was/is the recommendation of Dr. Ross as she was more stable while taking it as prescribed. Has not taken it x2 days. Agreeable to taking a dose now. Walked to [facility] room and had a dose of suboxone. Also encouraged relaxation technique (deep breathing). Pt agrees to let staff know if depression continues to worsen or is she develops thoughts of SI/SIB. Denies SI at this time. Continue to support as needed and encourage use of positive coping skills.   "

## 2018-02-05 NOTE — PROGRESS NOTES
"2/5/18  Pt reports feeling \"depressed\" and is worried about her choice to taper off of suboxone.   Pt recognizes following her suboxone regiment may be the better choice. Counselor contacted [facility] and there is a 2-3 week wait.  A message was left with [facility], but have not received a return call.  Pt is currently seeking options for partnering [facility] in [place] for the [facility] program.  Pt is requesting an additional day in [facility] as she finalizes her plan for ongoing care.  Plan to discharge from [facility], on 2/7/18.  "

## 2018-02-06 ENCOUNTER — HOSPITAL ENCOUNTER (OUTPATIENT)
Dept: BEHAVIORAL HEALTH | Facility: CLINIC | Age: 48
End: 2018-02-06
Attending: FAMILY MEDICINE
Payer: COMMERCIAL

## 2018-02-06 ENCOUNTER — OFFICE VISIT (OUTPATIENT)
Dept: ADDICTION MEDICINE | Facility: CLINIC | Age: 48
End: 2018-02-06
Payer: COMMERCIAL

## 2018-02-06 VITALS
BODY MASS INDEX: 22.83 KG/M2 | HEART RATE: 70 BPM | WEIGHT: 133 LBS | DIASTOLIC BLOOD PRESSURE: 74 MMHG | OXYGEN SATURATION: 100 % | SYSTOLIC BLOOD PRESSURE: 126 MMHG | RESPIRATION RATE: 16 BRPM

## 2018-02-06 DIAGNOSIS — F11.20 UNCOMPLICATED OPIOID DEPENDENCE (H): Primary | ICD-10-CM

## 2018-02-06 PROCEDURE — 99214 OFFICE O/P EST MOD 30 MIN: CPT | Performed by: FAMILY MEDICINE

## 2018-02-06 PROCEDURE — 10020000 ZZH LODGING PLUS FACILITY CHARGE ADULT

## 2018-02-06 PROCEDURE — H2035 A/D TX PROGRAM, PER HOUR: HCPCS | Mod: HQ

## 2018-02-06 NOTE — PROGRESS NOTES
2/6/17  Pt shared her relapse prevention plan identifying major triggers/warning signs with interventions for each.  Pt identified people who support her and activities she may engage in to support recovery.  Rebecca, from [facility], returned a call stating there is a 2-3 week wait for bed availability. Pt has resumed suboxone maintenance and finds this most beneficial.  Pt contacted [facility] and there is an available bed.  Pt's information has been sent to [facility].  Pt appears hesitant to enter sober housing and the [facility] outpatient program.  She reports she is considering the option of staying with a sober friend and wait for an opening at [facility].  Pt plans to discharge from [facility] on 2/7/18.  Pt continues to weigh her options.

## 2018-02-06 NOTE — MR AVS SNAPSHOT
"              After Visit Summary   2/6/2018    Mae Holder    MRN: [id number]           Patient Information     Date Of Birth          1970        Visit Information        Provider Department      2/6/2018 11:15 AM Mehdi Ross MD [facility]        Today's Diagnoses     Uncomplicated opioid dependence (H)    -  1       Follow-ups after your visit        Your next 10 appointments already scheduled     Feb 21, 2018 10:45 AM CST   Return Visit with Mehdi Ross MD   [facility] ([facility])    [address] [place] [address]  [address]  [facility] [id number] [phone]              Who to contact     If you have questions or need follow up information about today's clinic visit or your schedule please contact [facility] directly at [phone].  Normal or non-critical lab and imaging results will be communicated to you by MyChart, letter or phone within 4 business days after the clinic has received the results. If you do not hear from us within 7 days, please contact the clinic through MyChart or phone. If you have a critical or abnormal lab result, we will notify you by phone as soon as possible.  Submit refill requests through [facility] or call your pharmacy and they will forward the refill request to us. Please allow 3 business days for your refill to be completed.          Additional Information About Your Visit        MyChart Information     [facility] lets you send messages to your doctor, view your test results, renew your prescriptions, schedule appointments and more. To sign up, go to [email or web address] . Click on \"Log in\" on the left side of the screen, which will take you to the Welcome page. Then click on \"Sign up Now\" on the right side of the page.     You will be asked to enter the access code listed below, as well as some personal information. Please follow the directions " to create your username and password.     Your access code is: [email or web address]  Expires: 3/21/2018 10:09 AM     Your access code will  in 90 days. If you need help or a new code, please call your [place] clinic or [phone].        Care EveryWhere ID     This is your Care EveryWhere ID. This could be used by other organizations to access your [place] medical records  [id number]        Your Vitals Were     Pulse Respirations Pulse Oximetry BMI (Body Mass Index)          70 16 100% 22.83 kg/m2         Blood Pressure from Last 3 Encounters:   18 126/74   18 112/64   18 120/68    Weight from Last 3 Encounters:   18 133 lb (60.3 kg)   18 137 lb (62.1 kg)   18 141 lb 8 oz (64.2 kg)              Today, you had the following     No orders found for display       Primary Care Provider Office Phone # Fax #    Natty Barone [phone]       [facility] [address] [place] [address]        Equal Access to Services     [facility] : Hadii aad ku hadalfredoo Sojes, waaxda luqadaha, qaybta kaalmada adewilfredoyada, ismael fernández . So [facility] [phone].    ATENCIÓN: Si habla español, tiene a polo disposición servicios gratuitos de asistencia lingüística. Llame al [phone].    We comply with applicable federal civil rights laws and [place] laws. We do not discriminate on the basis of race, color, national origin, age, disability, sex, sexual orientation, or gender identity.            Thank you!     Thank you for choosing [facility]IMARY [facility]  for your care. Our goal is always to provide you with excellent care. Hearing back from our patients is one way we can continue to improve our services. Please take a few minutes to complete the written survey that you may receive in the mail after your visit with us. Thank you!             Your Updated Medication List - Protect others around you: Learn how to safely use,  store and throw away your medicines at www.disposemymeds.org.          This list is accurate as of 2/6/18 11:59 PM.  Always use your most recent med list.                   Brand Name Dispense Instructions for use Diagnosis    buprenorphine HCl-naloxone HCl 4-1 MG per film    SUBOXONE    84 Film    Place 1 Film under the tongue 3 times daily    Uncomplicated opioid dependence (H)       DULoxetine 60 MG EC capsule    CYMBALTA    30 capsule    Take 1 capsule (60 mg) by mouth daily    Depression with suicidal ideation       escitalopram 20 MG tablet    LEXAPRO    30 tablet    Take 1 tablet (20 mg) by mouth daily    Sedative, hypnotic or anxiolytic dependence (H)       fluticasone 220 MCG/ACT Inhaler    FLOVENT HFA     Inhale 1 puff into the lungs 2 times daily        gabapentin 300 MG capsule    NEURONTIN    90 capsule    Take 1 capsule (300 mg) by mouth 3 times daily    Sedative, hypnotic or anxiolytic dependence (H)       hydrOXYzine 25 MG tablet    ATARAX    120 tablet    Take 1-2 tablets (25-50 mg) by mouth every 4 hours as needed for anxiety    Sedative, hypnotic or anxiolytic dependence (H)       METHYLPHENIDATE HCL PO      Take 5 mg by mouth 2 times daily        multivitamin, therapeutic with minerals Tabs tablet     30 each    Take 1 tablet by mouth daily    Sedative, hypnotic or anxiolytic dependence (H)       polyethylene glycol powder    MIRALAX    510 g    Take 17 g (1 capful) by mouth daily    Slow transit constipation       * QUETIAPINE FUMARATE PO      Take 50 mg by mouth 3 times daily        * QUEtiapine 25 MG tablet    SEROquel    90 tablet    Take 2 tablets (50 mg) by mouth 3 times daily    Depression with suicidal ideation       traZODone 50 MG tablet    DESYREL    90 tablet    Take 1-3 tablets ( mg) by mouth At Bedtime    Sedative, hypnotic or anxiolytic dependence (H)       tretinoin 0.01 % topical gel    RETIN-A     Apply topically At Bedtime        VENTOLIN  (90 BASE) MCG/ACT Inhaler    Generic drug:  albuterol      Inhale 1 puff into the lungs every 4 hours as needed for shortness of breath / dyspnea or wheezing        * Notice:  This list has 2 medication(s) that are the same as other medications prescribed for you. Read the directions carefully, and ask your doctor or other care provider to review them with you.

## 2018-02-07 NOTE — PROGRESS NOTES
[address] [place] [address]        Mae Holder, 1970, was admitted for evaluation/treatment of chemical dependency at [facility].  This person took part in these program(s):    ______ The Inpatient Program   ______ The Outpatient Program   ___x__ [facility]   ______ [facility] Outpatient       Date admitted: 01/05/18  Date discharged:  02/07/18    Type of discharge:   ___x__ Satisfactory - completed evaluation / treatment   ______ Discharged without completing   ______ Behavioral discharge   ______ Transferred to another chemical dependency program   ______ Transferred to another type of service   ______ Left against medical advice (AMA) / Eloped       Comments: Discharged to [facility] and referred to [facility] outpatient program - [facility]      Counselor: LISA Lyle                       Date: 2/7/2018             Time: 8:04 AM

## 2018-02-07 NOTE — PROGRESS NOTES
MICD Discharge Summary/Instructions     Patient: Mae Holder  MRN: [id number]   : 1970 Age: 47 year old Sex: female   -  Focus of Treatment / Discharge Recommendations    Personal Safety/ Management of Symptoms  * Follow your safety plan.  Report increased symptoms to your care team and /or go to the nearest Emergency Department.  * Call crisis lines as needed  [facility] [phone]                [facility] [phone]  [facility] [phone]              [facility] [phone]  [facility] [phone]              [facility] [phone]  [facility] [phone]          National Suicide Prevention [phone]  [facility] [phone]            Suicide Prevention [phone]  [facility] [phone]    Abstinence/Relapse Prevention  * Take all medicines as directed.  Carry a current list of medicines with you.  * Use coping skills: nutrition, rest, exercise, spirituality, journal, meditation, seek sober support, engage in activities you enjoy  * Do not use illicit (street) drugs, controlled substances (narcotics) or alcohol.    Develop/Improve Independent Living/Socialization Skills: ensure living environment is conducive to recovery    Community Resources/Supports and Discharge Planning:  Maintain abstinence from all mood altering substances, maintain contact with a program sponsor, attend 2+ 12 step meetings per week, enter the outpatient program at [facility] and follow recommendations, enter [facility] and follow [place] guidelines, continue 1.1 therapy, maintain good physical and mental health care, work toward resolution of legal issues   Follow up with psychiatrist / main caregiver: Dr Ross - suboxone maintenance     [facility]  Follow up with your therapist:  Referrals provided    Next visit: TBD  [facility] and [facility]  See  your medical doctor about:  Ongoing physical health care    Client Signature:_______________________   Date / Time:___________  Staff Signature:________________________   Date / Time:___________

## 2018-02-07 NOTE — PROGRESS NOTES
2/7/18  Pt received a medallion from peers for program completion.  Pt met with counselors to review her plan for ongoing care. Pt made the decision to enter sober housing and the outpatient program.  Pt appears prepared for discharge at this time.  Pt discharged to [facility] and plans to begin the [facility] outpatient program, on 2/13/18.
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
## 2018-02-11 PROBLEM — F11.20 UNCOMPLICATED OPIOID DEPENDENCE (H): Status: ACTIVE | Noted: 2018-02-11

## 2018-02-11 NOTE — PROGRESS NOTES
SUBJECTIVE:   Mae Holder is a 47 year old female who presents to clinic today for the following health issues:          ADDICTION MEDICINE NOTE:    PREVIOUS HISTORY AS BELOW    TRIED TO TAPER OFF SUBOXONE SO SHE COULD GO TO THE RETREAT    FELT DEPRESSED, AWFUL AFTER 3 DAYS    SYMPTOMS RESOLVED IMMEDIATELY AFTER RESUMING SUBOXONE ; TAKING 4 MG TID    HAS 15 DAYS LEFT    WILL GO TO [facility]    RE-CHECK 2 WEEKS    MN : SUBOXONE 4 MG #68; 1/23/18            INITIAL VISIT    [facility] PAT[facility]    HERE TO DISCUSS MAT OPTIONS    SEVERAL YR HISTORY OF ADDICTION  ALCOHOL, OPIOIDS DRUGS OF CHOICE    TREATMENT AT [facility] 1 YR AGO  HISTORY OF SUCCESS WITH VIVITROL FOR ALCOHOL DEPENDENCE A FEW YRS AGO; TOOK IT FOR A YEAR    RECENT DETOX, THEN WENT TO THE RETREAT  DEVELOPED DEPRESSION THERE AND WAS HOSPITALIZED AT [facility] FOR A SHORT TIME  NOW IN [facility]    HAVING INTENSE CRAVING FOR ALCOHOL AND OPIOIDS  HISTORY OXYCODONE USE, HEROIN ONLY 1 TIME BUT LOVED IT    DISCUSSED PROS AND CONS OF SUBOXONE VS. VIVITROL  [facility] CAN[facility] IF SHE IS ON SUBOXONE  REALIZES SUBOXONE RELIEVES CRAVING BETTER THAN NALTREXONE    ELECTS TO PROCEED WITH SUBOXONE     Problem list and histories reviewed & adjusted, as indicated.  Additional history: as documented    Patient Active Problem List   Diagnosis     Sedative and hypnotic drug poisoning     Depression with suicidal ideation     Chemical dependency (H)     Past Surgical History:   Procedure Laterality Date     BUNIONECTOMY  2004, 2010     HYSTERECTOMY  07/2017    Partial       Social History   Substance Use Topics     Smoking status: Current Every Day Smoker     Packs/day: 1.00     Years: 6.00     Types: Cigarettes     Smokeless tobacco: Never Used     Alcohol use Yes     Family History   Problem Relation Age of Onset     Lung Cancer Paternal Grandfather          Current Outpatient Prescriptions   Medication Sig  Dispense Refill     METHYLPHENIDATE HCL PO Take 5 mg by mouth 2 times daily       tretinoin (RETIN-A) 0.01 % topical gel Apply topically At Bedtime       polyethylene glycol (MIRALAX) powder Take 17 g (1 capful) by mouth daily 510 g 1     DULoxetine (CYMBALTA) 60 MG EC capsule Take 1 capsule (60 mg) by mouth daily 30 capsule 0     multivitamin, therapeutic with minerals (THERA-VIT-M) TABS tablet Take 1 tablet by mouth daily 30 each 0     traZODone (DESYREL) 50 MG tablet Take 1-3 tablets ( mg) by mouth At Bedtime 90 tablet 0     buprenorphine HCl-naloxone HCl (SUBOXONE) 4-1 MG per film Place 1 Film under the tongue 3 times daily 84 Film 0     QUETIAPINE FUMARATE PO Take 50 mg by mouth 3 times daily       QUEtiapine (SEROQUEL) 25 MG tablet Take 2 tablets (50 mg) by mouth 3 times daily 90 tablet 1     albuterol (VENTOLIN HFA) 108 (90 BASE) MCG/ACT Inhaler Inhale 1 puff into the lungs every 4 hours as needed for shortness of breath / dyspnea or wheezing       fluticasone (FLOVENT HFA) 220 MCG/ACT Inhaler Inhale 1 puff into the lungs 2 times daily       hydrOXYzine (ATARAX) 25 MG tablet Take 1-2 tablets (25-50 mg) by mouth every 4 hours as needed for anxiety 120 tablet 0     gabapentin (NEURONTIN) 300 MG capsule Take 1 capsule (300 mg) by mouth 3 times daily 90 capsule 0     escitalopram (LEXAPRO) 20 MG tablet Take 1 tablet (20 mg) by mouth daily 30 tablet 0     No Known Allergies  Labs reviewed in EPIC      Reviewed and updated as needed this visit by clinical staffTobacco  Allergies  Meds       Reviewed and updated as needed this visit by Provider         ROS:      OBJECTIVE:     /74  Pulse 70  Resp 16  Wt 133 lb (60.3 kg)  SpO2 100%  BMI 22.83 kg/m2  Body mass index is 22.83 kg/(m^2).     ROS:  Constitutional, HEENT, cardiovascular, pulmonary, gi and gu systems are negative, except as otherwise noted.    /74  Pulse 70  Resp 16  Wt 133 lb (60.3 kg)  SpO2 100%  BMI 22.83  kg/m2  EXAM:  GENERAL APPEARANCE: healthy, alert and no distress  EYES: Eyes grossly normal to inspection, PERRL and conjunctivae and sclerae normal  NEURO: Normal strength and tone, mentation intact and speech normal  PSYCH: mentation appears normal and affect normal/bright  MENTAL STATUS EXAM:  Appearance/Behavior: No apparent distress and Casually groomed  Speech: Normal  Mood/Affect: normal affect  Insight: Adequate          ASSESSMENT:   OPIOID DEPENDENCE   ALCOHOL DEPENDENCE     PLAN:     No diagnosis found.        MEDICATIONS:   No orders of the defined types were placed in this encounter.         - Continue other medications without change  FUTURE APPOINTMENTS:       - Follow-up visit in 4 WEEKS    Mehdi Ross MD  [facility] PRIMARY [facility]

## 2018-02-21 ENCOUNTER — OFFICE VISIT (OUTPATIENT)
Dept: ADDICTION MEDICINE | Facility: CLINIC | Age: 48
End: 2018-02-21
Payer: COMMERCIAL

## 2018-02-21 VITALS
OXYGEN SATURATION: 98 % | DIASTOLIC BLOOD PRESSURE: 64 MMHG | SYSTOLIC BLOOD PRESSURE: 108 MMHG | BODY MASS INDEX: 23.6 KG/M2 | HEART RATE: 71 BPM | WEIGHT: 137.5 LBS

## 2018-02-21 DIAGNOSIS — F11.20 UNCOMPLICATED OPIOID DEPENDENCE (H): Primary | ICD-10-CM

## 2018-02-21 LAB
AMPHETAMINES UR QL: NOT DETECTED NG/ML
BARBITURATES UR QL SCN: NOT DETECTED NG/ML
BENZODIAZ UR QL SCN: NOT DETECTED NG/ML
BUPRENORPHINE UR QL: ABNORMAL NG/ML
CANNABINOIDS UR QL: NOT DETECTED NG/ML
COCAINE UR QL SCN: NOT DETECTED NG/ML
D-METHAMPHET UR QL: NOT DETECTED NG/ML
METHADONE UR QL SCN: NOT DETECTED NG/ML
OPIATES UR QL SCN: NOT DETECTED NG/ML
OXYCODONE UR QL SCN: NOT DETECTED NG/ML
PCP UR QL SCN: NOT DETECTED NG/ML
PROPOXYPH UR QL: NOT DETECTED NG/ML
TRICYCLICS UR QL SCN: NOT DETECTED NG/ML

## 2018-02-21 PROCEDURE — 99214 OFFICE O/P EST MOD 30 MIN: CPT | Performed by: FAMILY MEDICINE

## 2018-02-21 PROCEDURE — 80306 DRUG TEST PRSMV INSTRMNT: CPT | Performed by: FAMILY MEDICINE

## 2018-02-21 RX ORDER — BUPRENORPHINE AND NALOXONE 4; 1 MG/1; MG/1
1 FILM, SOLUBLE BUCCAL; SUBLINGUAL 3 TIMES DAILY
Qty: 80 FILM | Refills: 0 | Status: SHIPPED | OUTPATIENT
Start: 2018-02-21 | End: 2018-03-20

## 2018-02-21 NOTE — MR AVS SNAPSHOT
After Visit Summary   2/21/2018    Mae Holder    MRN: [id number]           Patient Information     Date Of Birth          1970        Visit Information        Provider Department      2/21/2018 10:45 AM Mehdi Ross MD [facility]        Today's Diagnoses     Uncomplicated opioid dependence (H)    -  1      Care Instructions    Continue your Buprenorphine 4 mg  films/tabs  3 times daily     Follow up 1 month    A prescription has been sent to your pharmacy of choice.  If a prior authorization is required it may take several days to get your medication.  Please make sure your pharmacy had your contact information so they can contact you when it is ready to     You are at risk for overdose  ( including risk of death)  with return to use of opioids after a period of abstinence because your tolerance will have decreased dramatically.      It is strongly recommended that you abstain from alcohol, benzodiazepines (Xanax Valium, Klonipin) , THC, opioids and other drugs of abuse.  Use of these substances increases your risk of relapse for opioids.  Using these substances with Buprenorphine also incresaes your risk of overdose/death (especially alcohol/benzodiazepines).     You are encouraged to have some type of recovery program in addition to medication treatment.  Medication alone is generally not enough to lead to long term recovery.  This may include having some type of sober network, avoiding isolating, avoiding triggers (people, places, things you associate with using opioids).   Such supports may include Alcoholics Anonymous, Narcotics anonymous or other self help organizations as well as counseling.  We can help provide resources to these services.      Narcan kit prescriptions are available if you do not have one.       The addiction medicine clinic number is [phone].    If you cannot make your appointment please call the office and reschedule  immediately. If you are out of medication a bridge can be sent to your pharmacy to last until the date of your rescheduled appointment. Our clinic is open from Monday-Friday 0800-4:30pm and there is not an ON CALL after hours service.  If medical care is needed after hours or on the weekend you will need to contact your primary care physician or go to an Urgent Care or ER.     [employer] messages and telephone calls from patients are taken care of by the nursing team within 24 business hours if received between Monday 8am - Friday 4:30pm. Therefore if a refill/bridge is needed it is important to call in advance so you do not run out of medications.     [facility] does not accept [email or web address] or [employer] assistance insurance.                      Follow-ups after your visit        Future tests that were ordered for you today     Open Standing Orders        Priority Remaining Interval Expires Ordered    Urine Drugs of Abuse Screen Panel 13 Routine 99/100  2/21/2019 2/21/2018            Who to contact     If you have questions or need follow up information about today's clinic visit or your schedule please contact [facility] directly at [phone].  Normal or non-critical lab and imaging results will be communicated to you by [employer], letter or phone within 4 business days after the clinic has received the results. If you do not hear from us within 7 days, please contact the clinic through [employer] or phone. If you have a critical or abnormal lab result, we will notify you by phone as soon as possible.  Submit refill requests through [employer] or call your pharmacy and they will forward the refill request to us. Please allow 3 business days for your refill to be completed.          Additional Information About Your Visit        [employer] Information     [employer] lets you send messages to your doctor, view your test results, renew your prescriptions, schedule appointments  "and more. To sign up, go to [email or web address] . Click on \"Log in\" on the left side of the screen, which will take you to the Welcome page. Then click on \"Sign up Now\" on the right side of the page.     You will be asked to enter the access code listed below, as well as some personal information. Please follow the directions to create your username and password.     Your access code is: [email or web address]  Expires: 3/21/2018 10:09 AM     Your access code will  in 90 days. If you need help or a new code, please call your [place] [facility] clinic or [phone].        Care EveryWhere ID     This is your Care EveryWhere ID. This could be used by other organizations to access your [facility] medical records  [id number]        Your Vitals Were     Pulse Pulse Oximetry BMI (Body Mass Index)             71 98% 23.6 kg/m2          Blood Pressure from Last 3 Encounters:   18 108/64   18 126/74   18 112/64    Weight from Last 3 Encounters:   18 137 lb 8 oz (62.4 kg)   18 133 lb (60.3 kg)   18 137 lb (62.1 kg)              We Performed the Following     Urine Drugs of Abuse Screen Panel 13          Where to get your medicines      Some of these will need a paper prescription and others can be bought over the counter.  Ask your nurse if you have questions.     Bring a paper prescription for each of these medications     buprenorphine HCl-naloxone HCl 4-1 MG per film          Primary Care Provider Office Phone # Fax #    Natty Barone [phone]       [facility] [address] [place] [address]        Equal Access to Services     ROMULO MARQUEZ : Bhavna Oro, edita schwartz, ismael beck. So [facility] [phone].    ATENCIÓN: Si habla español, tiene a polo disposición servicios gratuitos de asistencia lingüística. Llame al [phone].    We comply with applicable federal civil rights laws and " [place] laws. We do not discriminate on the basis of race, color, national origin, age, disability, sex, sexual orientation, or gender identity.            Thank you!     Thank you for choosing [facility]  for your care. Our goal is always to provide you with excellent care. Hearing back from our patients is one way we can continue to improve our services. Please take a few minutes to complete the written survey that you may receive in the mail after your visit with us. Thank you!             Your Updated Medication List - Protect others around you: Learn how to safely use, store and throw away your medicines at www.disposemymeds.org.          This list is accurate as of 2/21/18 11:05 AM.  Always use your most recent med list.                   Brand Name Dispense Instructions for use Diagnosis    buprenorphine HCl-naloxone HCl 4-1 MG per film    SUBOXONE    80 Film    Place 1 Film under the tongue 3 times daily    Uncomplicated opioid dependence (H)       DULoxetine 60 MG EC capsule    CYMBALTA    30 capsule    Take 1 capsule (60 mg) by mouth daily    Depression with suicidal ideation       escitalopram 20 MG tablet    LEXAPRO    30 tablet    Take 1 tablet (20 mg) by mouth daily    Sedative, hypnotic or anxiolytic dependence (H)       fluticasone 220 MCG/ACT Inhaler    FLOVENT HFA     Inhale 1 puff into the lungs 2 times daily        gabapentin 300 MG capsule    NEURONTIN    90 capsule    Take 1 capsule (300 mg) by mouth 3 times daily    Sedative, hypnotic or anxiolytic dependence (H)       hydrOXYzine 25 MG tablet    ATARAX    120 tablet    Take 1-2 tablets (25-50 mg) by mouth every 4 hours as needed for anxiety    Sedative, hypnotic or anxiolytic dependence (H)       METHYLPHENIDATE HCL PO      Take 5 mg by mouth 2 times daily        multivitamin, therapeutic with minerals Tabs tablet     30 each    Take 1 tablet by mouth daily    Sedative, hypnotic or anxiolytic dependence (H)        polyethylene glycol powder    MIRALAX    510 g    Take 17 g (1 capful) by mouth daily    Slow transit constipation       * QUETIAPINE FUMARATE PO      Take 50 mg by mouth 3 times daily        * QUEtiapine 25 MG tablet    SEROquel    90 tablet    Take 2 tablets (50 mg) by mouth 3 times daily    Depression with suicidal ideation       traZODone 50 MG tablet    DESYREL    90 tablet    Take 1-3 tablets ( mg) by mouth At Bedtime    Sedative, hypnotic or anxiolytic dependence (H)       tretinoin 0.01 % topical gel    RETIN-A     Apply topically At Bedtime        VENTOLIN  (90 BASE) MCG/ACT Inhaler   Generic drug:  albuterol      Inhale 1 puff into the lungs every 4 hours as needed for shortness of breath / dyspnea or wheezing        * Notice:  This list has 2 medication(s) that are the same as other medications prescribed for you. Read the directions carefully, and ask your doctor or other care provider to review them with you.

## 2018-02-21 NOTE — PATIENT INSTRUCTIONS
Continue your Buprenorphine 4 mg  films/tabs  3 times daily     Follow up 1 month    A prescription has been sent to your pharmacy of choice.  If a prior authorization is required it may take several days to get your medication.  Please make sure your pharmacy had your contact information so they can contact you when it is ready to     You are at risk for overdose  ( including risk of death)  with return to use of opioids after a period of abstinence because your tolerance will have decreased dramatically.      It is strongly recommended that you abstain from alcohol, benzodiazepines (Xanax Valium, Klonipin) , THC, opioids and other drugs of abuse.  Use of these substances increases your risk of relapse for opioids.  Using these substances with Buprenorphine also incresaes your risk of overdose/death (especially alcohol/benzodiazepines).     You are encouraged to have some type of recovery program in addition to medication treatment.  Medication alone is generally not enough to lead to long term recovery.  This may include having some type of sober network, avoiding isolating, avoiding triggers (people, places, things you associate with using opioids).   Such supports may include Alcoholics Anonymous, Narcotics anonymous or other self help organizations as well as counseling.  We can help provide resources to these services.      Narcan kit prescriptions are available if you do not have one.       The addiction medicine clinic number is [phone].    If you cannot make your appointment please call the office and reschedule immediately. If you are out of medication a bridge can be sent to your pharmacy to last until the date of your rescheduled appointment. Our clinic is open from Monday-Friday 0800-4:30pm and there is not an ON CALL after hours service.  If medical care is needed after hours or on the weekend you will need to contact your primary care physician or go to an Urgent Care or ER.     [facility]  messages and telephone calls from patients are taken care of by the nursing team within 24 business hours if received between Monday 8am - Friday 4:30pm. Therefore if a refill/bridge is needed it is important to call in advance so you do not run out of medications.     [facility] does not accept [facility] or [employer] assistance insurance.

## 2018-02-23 NOTE — PROGRESS NOTES
SUBJECTIVE:   Mae Holder is a 47 year old female who presents to clinic today for the following health issues:          ADDICTION MEDICINE NOTE:    PREVIOUS HISTORY AS BELOW    IS AT THE RETREAT  LIKES IT     DOES WELL ON SUBOXONE; FEELS WELL, NORMAL    ABLE TO BETTER BENEFIT FROM TREATMENT PROGRAM    TAKING SUBOXONE 4 MG TID    WILL CONTINUE AS IS    LOOKING INTO GOING TO [place] OR [facility] AFTER THE RETREAT    CONTINUE SAME    RE-CHECK 4 WEEKS    [place] : SUBOXONE 4 MG #68; 1/23/18          PREVIOUS HISTORY :    INITIAL VISIT    LODGING PLUS PATIENT    HERE TO DISCUSS MAT OPTIONS    SEVERAL YR HISTORY OF ADDICTION  ALCOHOL, OPIOIDS DRUGS OF CHOICE    TREATMENT AT [facility] 1 YR AGO  HISTORY OF SUCCESS WITH VIVITROL FOR ALCOHOL DEPENDENCE A FEW YRS AGO; TOOK IT FOR A YEAR    RECENT DETOX, THEN WENT TO THE RETREAT  DEVELOPED DEPRESSION THERE AND WAS HOSPITALIZED AT [facility] FOR A SHORT TIME  NOW IN [facility]    HAVING INTENSE CRAVING FOR ALCOHOL AND OPIOIDS  HISTORY OXYCODONE USE, HEROIN ONLY 1 TIME BUT LOVED IT    DISCUSSED PROS AND CONS OF SUBOXONE VS. VIVITROL  CONSIDERING GOING BACK TO THE RETREAT AFTER LODGING PLUS BUT CAN'T IF SHE IS ON SUBOXONE  REALIZES SUBOXONE RELIEVES CRAVING BETTER THAN NALTREXONE    ELECTS TO PROCEED WITH SUBOXONE     Problem list and histories reviewed & adjusted, as indicated.  Additional history: as documented    Patient Active Problem List   Diagnosis     Sedative and hypnotic drug poisoning     Depression with suicidal ideation     Chemical dependency (H)     Uncomplicated opioid dependence (H)     Past Surgical History:   Procedure Laterality Date     BUNIONECTOMY  2004, 2010     HYSTERECTOMY  07/2017    Partial       Social History   Substance Use Topics     Smoking status: Current Every Day Smoker     Packs/day: 1.00     Years: 6.00     Types: Cigarettes     Smokeless tobacco: Never Used     Alcohol use Yes     Family History   Problem Relation Age of  Onset     Lung Cancer Paternal Grandfather          Current Outpatient Prescriptions   Medication Sig Dispense Refill     buprenorphine HCl-naloxone HCl (SUBOXONE) 4-1 MG per film Place 1 Film under the tongue 3 times daily 80 Film 0     METHYLPHENIDATE HCL PO Take 5 mg by mouth 2 times daily       tretinoin (RETIN-A) 0.01 % topical gel Apply topically At Bedtime       polyethylene glycol (MIRALAX) powder Take 17 g (1 capful) by mouth daily 510 g 1     DULoxetine (CYMBALTA) 60 MG EC capsule Take 1 capsule (60 mg) by mouth daily 30 capsule 0     multivitamin, therapeutic with minerals (THERA-VIT-M) TABS tablet Take 1 tablet by mouth daily 30 each 0     traZODone (DESYREL) 50 MG tablet Take 1-3 tablets ( mg) by mouth At Bedtime 90 tablet 0     QUETIAPINE FUMARATE PO Take 50 mg by mouth 3 times daily       QUEtiapine (SEROQUEL) 25 MG tablet Take 2 tablets (50 mg) by mouth 3 times daily 90 tablet 1     albuterol (VENTOLIN HFA) 108 (90 BASE) MCG/ACT Inhaler Inhale 1 puff into the lungs every 4 hours as needed for shortness of breath / dyspnea or wheezing       fluticasone (FLOVENT HFA) 220 MCG/ACT Inhaler Inhale 1 puff into the lungs 2 times daily       hydrOXYzine (ATARAX) 25 MG tablet Take 1-2 tablets (25-50 mg) by mouth every 4 hours as needed for anxiety 120 tablet 0     gabapentin (NEURONTIN) 300 MG capsule Take 1 capsule (300 mg) by mouth 3 times daily 90 capsule 0     escitalopram (LEXAPRO) 20 MG tablet Take 1 tablet (20 mg) by mouth daily 30 tablet 0     No Known Allergies  Labs reviewed in EPIC      Reviewed and updated as needed this visit by clinical staffMeds       Reviewed and updated as needed this visit by Provider         ROS:      OBJECTIVE:     /64  Pulse 71  Wt 137 lb 8 oz (62.4 kg)  SpO2 98%  BMI 23.6 kg/m2  Body mass index is 23.6 kg/(m^2).     ROS:  Constitutional, HEENT, cardiovascular, pulmonary, gi and gu systems are negative, except as otherwise noted.    /64  Pulse 71   Wt 137 lb 8 oz (62.4 kg)  SpO2 98%  BMI 23.6 kg/m2  EXAM:  GENERAL APPEARANCE: healthy, alert and no distress  EYES: Eyes grossly normal to inspection, PERRL and conjunctivae and sclerae normal  NEURO: Normal strength and tone, mentation intact and speech normal  PSYCH: mentation appears normal and affect normal/bright  MENTAL STATUS EXAM:  Appearance/Behavior: No apparent distress and Casually groomed  Speech: Normal  Mood/Affect: normal affect  Insight: Adequate          ASSESSMENT:   OPIOID DEPENDENCE   ALCOHOL DEPENDENCE     PLAN:       ICD-10-CM    1. Uncomplicated opioid dependence (H) F11.20 Urine Drugs of Abuse Screen Panel 13     Urine Drugs of Abuse Screen Panel 13     buprenorphine HCl-naloxone HCl (SUBOXONE) 4-1 MG per film           MEDICATIONS:   Orders Placed This Encounter   Medications     buprenorphine HCl-naloxone HCl (SUBOXONE) 4-1 MG per film     Sig: Place 1 Film under the tongue 3 times daily     Dispense:  80 Film     Refill:  0          - Continue other medications without change  FUTURE APPOINTMENTS:       - Follow-up visit in 4 WEEKS    Mehdi Ross MD  [facility]

## 2018-03-09 ENCOUNTER — COMMUNICATION - HEALTHEAST (OUTPATIENT)
Dept: FAMILY MEDICINE | Facility: CLINIC | Age: 48
End: 2018-03-09

## 2018-03-09 ENCOUNTER — TELEPHONE (OUTPATIENT)
Dept: ADDICTION MEDICINE | Facility: CLINIC | Age: 48
End: 2018-03-09

## 2018-03-09 DIAGNOSIS — F33.1 MDD (MAJOR DEPRESSIVE DISORDER), RECURRENT EPISODE, MODERATE (H): ICD-10-CM

## 2018-03-09 DIAGNOSIS — F41.1 ANXIETY STATE: ICD-10-CM

## 2018-03-09 NOTE — TELEPHONE ENCOUNTER
Incoming call from pt, pt's currently in [facility] the facility has her Subx and wont be giving any dose without an ok from a nurse. Pt stated she brought med into the facility withtout the original packaging and the facility will need orders.  Facility contact info: [phone]  Ask for Aisha or Bernie.        Ortiz Ortiz

## 2018-03-09 NOTE — TELEPHONE ENCOUNTER
Call placed to [facility].    LVM for nurses to call clinic back.      Christopher Carrillo RN

## 2018-03-09 NOTE — TELEPHONE ENCOUNTER
John Tenorio called back.  Writer faxed current Rx information to John Tenorio.    John tenorio stated that the medication is not in the pao, it's just loose and it needs an expiration date.  Writer was unable to give center that information but recommended calling pharmacy for a new label.    Christopher Carrillo RN

## 2018-03-15 ENCOUNTER — COMMUNICATION - HEALTHEAST (OUTPATIENT)
Dept: FAMILY MEDICINE | Facility: CLINIC | Age: 48
End: 2018-03-15

## 2018-03-20 ENCOUNTER — OFFICE VISIT - HEALTHEAST (OUTPATIENT)
Dept: FAMILY MEDICINE | Facility: CLINIC | Age: 48
End: 2018-03-20

## 2018-03-20 ENCOUNTER — OFFICE VISIT (OUTPATIENT)
Dept: ADDICTION MEDICINE | Facility: CLINIC | Age: 48
End: 2018-03-20
Payer: COMMERCIAL

## 2018-03-20 VITALS
WEIGHT: 140 LBS | RESPIRATION RATE: 18 BRPM | BODY MASS INDEX: 24.03 KG/M2 | OXYGEN SATURATION: 97 % | DIASTOLIC BLOOD PRESSURE: 78 MMHG | SYSTOLIC BLOOD PRESSURE: 122 MMHG | HEART RATE: 95 BPM

## 2018-03-20 DIAGNOSIS — M25.50 ARTHRALGIA: ICD-10-CM

## 2018-03-20 DIAGNOSIS — Z12.39 ENCOUNTER FOR SPECIAL SCREENING EXAMINATION FOR NEOPLASM OF BREAST: ICD-10-CM

## 2018-03-20 DIAGNOSIS — L70.9 ACNE: ICD-10-CM

## 2018-03-20 DIAGNOSIS — F33.1 MDD (MAJOR DEPRESSIVE DISORDER), RECURRENT EPISODE, MODERATE (H): ICD-10-CM

## 2018-03-20 DIAGNOSIS — F41.1 ANXIETY STATE: ICD-10-CM

## 2018-03-20 DIAGNOSIS — F11.20 UNCOMPLICATED OPIOID DEPENDENCE (H): ICD-10-CM

## 2018-03-20 DIAGNOSIS — R21 RASH: ICD-10-CM

## 2018-03-20 LAB
AMPHETAMINES UR QL: NOT DETECTED NG/ML
BARBITURATES UR QL SCN: NOT DETECTED NG/ML
BENZODIAZ UR QL SCN: NOT DETECTED NG/ML
BUPRENORPHINE UR QL: ABNORMAL NG/ML
C REACTIVE PROTEIN LHE: 0.2 MG/DL (ref 0–0.8)
CANNABINOIDS UR QL: NOT DETECTED NG/ML
COCAINE UR QL SCN: NOT DETECTED NG/ML
D-METHAMPHET UR QL: NOT DETECTED NG/ML
KOH PREPARATION: NORMAL
METHADONE UR QL SCN: NOT DETECTED NG/ML
OPIATES UR QL SCN: NOT DETECTED NG/ML
OXYCODONE UR QL SCN: NOT DETECTED NG/ML
PCP UR QL SCN: NOT DETECTED NG/ML
PROPOXYPH UR QL: NOT DETECTED NG/ML
RHEUMATOID FACT SERPL-ACNC: <15 IU/ML (ref 0–30)
T4 FREE SERPL-MCNC: 0.9 NG/DL (ref 0.7–1.8)
TRICYCLICS UR QL SCN: ABNORMAL NG/ML
TSH SERPL DL<=0.005 MIU/L-ACNC: 0.5 UIU/ML (ref 0.3–5)

## 2018-03-20 PROCEDURE — 99214 OFFICE O/P EST MOD 30 MIN: CPT | Performed by: FAMILY MEDICINE

## 2018-03-20 PROCEDURE — 80306 DRUG TEST PRSMV INSTRMNT: CPT | Performed by: FAMILY MEDICINE

## 2018-03-20 RX ORDER — BUPRENORPHINE AND NALOXONE 4; 1 MG/1; MG/1
1 FILM, SOLUBLE BUCCAL; SUBLINGUAL 3 TIMES DAILY
Qty: 84 FILM | Refills: 0 | Status: SHIPPED | OUTPATIENT
Start: 2018-03-20 | End: 2018-04-17

## 2018-03-20 NOTE — MR AVS SNAPSHOT
After Visit Summary   3/20/2018    Mae Holder    MRN: [id number]           Patient Information     Date Of Birth          1970        Visit Information        Provider Department      3/20/2018 10:45 AM Mehdi Ross MD [facility]        Today's Diagnoses     Uncomplicated opioid dependence (H)          Care Instructions    Continue your Buprenorphine 4 mg  films/tabs  3 times daily     Follow up 4 weeks    A prescription has been sent to your pharmacy of choice.  If a prior authorization is required it may take several days to get your medication.  Please make sure your pharmacy had your contact information so they can contact you when it is ready to     You are at risk for overdose  ( including risk of death)  with return to use of opioids after a period of abstinence because your tolerance will have decreased dramatically.      It is strongly recommended that you abstain from alcohol, benzodiazepines (Xanax Valium, Klonipin) , THC, opioids and other drugs of abuse.  Use of these substances increases your risk of relapse for opioids.  Using these substances with Buprenorphine also incresaes your risk of overdose/death (especially alcohol/benzodiazepines).     You are encouraged to have some type of recovery program in addition to medication treatment.  Medication alone is generally not enough to lead to long term recovery.  This may include having some type of sober network, avoiding isolating, avoiding triggers (people, places, things you associate with using opioids).   Such supports may include Alcoholics Anonymous, Narcotics anonymous or other self help organizations as well as counseling.  We can help provide resources to these services.      Narcan kit prescriptions are available if you do not have one.       The addiction medicine clinic number is [phone].    If you cannot make your appointment please call the office and reschedule  "immediately. If you are out of medication a bridge can be sent to your pharmacy to last until the date of your rescheduled appointment. Our clinic is open from Monday-Friday 0800-4:30pm and there is not an ON CALL after hours service.  If medical care is needed after hours or on the weekend you will need to contact your primary care physician or go to an Urgent Care or ER.     [employer] messages and telephone calls from patients are taken care of by the nursing team within 24 business hours if received between Monday 8am - Friday 4:30pm. Therefore if a refill/bridge is needed it is important to call in advance so you do not run out of medications.     [facility] does not accept [employer] or [employer] Medical assistance insurance.                      Follow-ups after your visit        Who to contact     If you have questions or need follow up information about today's clinic visit or your schedule please contact [facility] directly at [phone].  Normal or non-critical lab and imaging results will be communicated to you by Urban Traffichart, letter or phone within 4 business days after the clinic has received the results. If you do not hear from us within 7 days, please contact the clinic through Shidonnit or phone. If you have a critical or abnormal lab result, we will notify you by phone as soon as possible.  Submit refill requests through [employer] or call your pharmacy and they will forward the refill request to us. Please allow 3 business days for your refill to be completed.          Additional Information About Your Visit        [employer] Information     [employer] lets you send messages to your doctor, view your test results, renew your prescriptions, schedule appointments and more. To sign up, go to [email or web address] . Click on \"Log in\" on the left side of the screen, which will take you to the Welcome page. Then click on \"Sign up Now\" on the right side of the page. "     You will be asked to enter the access code listed below, as well as some personal information. Please follow the directions to create your username and password.     Your access code is: [email or web address]  Expires: 3/21/2018 11:09 AM     Your access code will  in 90 days. If you need help or a new code, please call your [place] clinic or [phone].        Care EveryWhere ID     This is your Care EveryWhere ID. This could be used by other organizations to access your [place] medical records  [id number]        Your Vitals Were     Pulse Respirations Pulse Oximetry BMI (Body Mass Index)          95 18 97% 24.03 kg/m2         Blood Pressure from Last 3 Encounters:   18 122/78   18 108/64   18 126/74    Weight from Last 3 Encounters:   18 140 lb (63.5 kg)   18 137 lb 8 oz (62.4 kg)   18 133 lb (60.3 kg)              We Performed the Following     Urine Drugs of Abuse Screen Panel 13          Where to get your medicines      Some of these will need a paper prescription and others can be bought over the counter.  Ask your nurse if you have questions.     Bring a paper prescription for each of these medications     buprenorphine HCl-naloxone HCl 4-1 MG per film          Primary Care Provider Office Phone # Fax #    Natty Barone [phone]       [facility] [address] [place] [address]        Equal Access to Services     [facility] : Hadii gaurav Oro, waubaldoda lana, qaybta kaalleonora burgess, ismael melgar. So [facility] [phone].    ATENCIÓN: Si habla español, tiene a polo disposición servicios gratuitos de asistencia lingüística. Llame al [phone].    We comply with applicable federal civil rights laws and [place] laws. We do not discriminate on the basis of race, color, national origin, age, disability, sex, sexual orientation, or gender identity.            Thank you!     Thank you for  [facility]  for your care. Our goal is always to provide you with excellent care. Hearing back from our patients is one way we can continue to improve our services. Please take a few minutes to complete the written survey that you may receive in the mail after your visit with us. Thank you!             Your Updated Medication List - Protect others around you: Learn how to safely use, store and throw away your medicines at www.disposemymeds.org.          This list is accurate as of 3/20/18 11:07 AM.  Always use your most recent med list.                   Brand Name Dispense Instructions for use Diagnosis    buprenorphine HCl-naloxone HCl 4-1 MG per film    SUBOXONE    84 Film    Place 1 Film under the tongue 3 times daily    Uncomplicated opioid dependence (H)       DULoxetine 60 MG EC capsule    CYMBALTA    30 capsule    Take 1 capsule (60 mg) by mouth daily    Depression with suicidal ideation       escitalopram 20 MG tablet    LEXAPRO    30 tablet    Take 1 tablet (20 mg) by mouth daily    Sedative, hypnotic or anxiolytic dependence (H)       fluticasone 220 MCG/ACT Inhaler    FLOVENT HFA     Inhale 1 puff into the lungs 2 times daily        gabapentin 300 MG capsule    NEURONTIN    90 capsule    Take 1 capsule (300 mg) by mouth 3 times daily    Sedative, hypnotic or anxiolytic dependence (H)       hydrOXYzine 25 MG tablet    ATARAX    120 tablet    Take 1-2 tablets (25-50 mg) by mouth every 4 hours as needed for anxiety    Sedative, hypnotic or anxiolytic dependence (H)       METHYLPHENIDATE HCL PO      Take 5 mg by mouth 2 times daily        multivitamin, therapeutic with minerals Tabs tablet     30 each    Take 1 tablet by mouth daily    Sedative, hypnotic or anxiolytic dependence (H)       polyethylene glycol powder    MIRALAX    510 g    Take 17 g (1 capful) by mouth daily    Slow transit constipation       * QUETIAPINE FUMARATE PO      Take 50 mg by mouth 3 times daily         * QUEtiapine 25 MG tablet    SEROquel    90 tablet    Take 2 tablets (50 mg) by mouth 3 times daily    Depression with suicidal ideation       traZODone 50 MG tablet    DESYREL    90 tablet    Take 1-3 tablets ( mg) by mouth At Bedtime    Sedative, hypnotic or anxiolytic dependence (H)       tretinoin 0.01 % topical gel    RETIN-A     Apply topically At Bedtime        VENTOLIN  (90 BASE) MCG/ACT Inhaler   Generic drug:  albuterol      Inhale 1 puff into the lungs every 4 hours as needed for shortness of breath / dyspnea or wheezing        * Notice:  This list has 2 medication(s) that are the same as other medications prescribed for you. Read the directions carefully, and ask your doctor or other care provider to review them with you.

## 2018-03-20 NOTE — PATIENT INSTRUCTIONS
Continue your Buprenorphine 4 mg  films/tabs  3 times daily     Follow up 4 weeks    A prescription has been sent to your pharmacy of choice.  If a prior authorization is required it may take several days to get your medication.  Please make sure your pharmacy had your contact information so they can contact you when it is ready to     You are at risk for overdose  ( including risk of death)  with return to use of opioids after a period of abstinence because your tolerance will have decreased dramatically.      It is strongly recommended that you abstain from alcohol, benzodiazepines (Xanax Valium, Klonipin) , THC, opioids and other drugs of abuse.  Use of these substances increases your risk of relapse for opioids.  Using these substances with Buprenorphine also incresaes your risk of overdose/death (especially alcohol/benzodiazepines).     You are encouraged to have some type of recovery program in addition to medication treatment.  Medication alone is generally not enough to lead to long term recovery.  This may include having some type of sober network, avoiding isolating, avoiding triggers (people, places, things you associate with using opioids).   Such supports may include Alcoholics Anonymous, Narcotics anonymous or other self help organizations as well as counseling.  We can help provide resources to these services.      Narcan kit prescriptions are available if you do not have one.       The addiction medicine clinic number is [phone].    If you cannot make your appointment please call the office and reschedule immediately. If you are out of medication a bridge can be sent to your pharmacy to last until the date of your rescheduled appointment. Our clinic is open from Monday-Friday 0800-4:30pm and there is not an ON CALL after hours service.  If medical care is needed after hours or on the weekend you will need to contact your primary care physician or go to an Urgent Care or ER.     Jose  messages and telephone calls from patients are taken care of by the nursing team within 24 business hours if received between Monday 8am - Friday 4:30pm. Therefore if a refill/bridge is needed it is important to call in advance so you do not run out of medications.     [facility]] does not accept [facility] or [employer] Medical assistance insurance.

## 2018-03-21 ENCOUNTER — COMMUNICATION - HEALTHEAST (OUTPATIENT)
Dept: FAMILY MEDICINE | Facility: CLINIC | Age: 48
End: 2018-03-21

## 2018-03-21 DIAGNOSIS — J45.909 ASTHMA: ICD-10-CM

## 2018-03-21 LAB
25(OH)D3 SERPL-MCNC: 40.9 NG/ML (ref 30–80)
B BURGDOR IGG+IGM SER QL: 0.04 INDEX VALUE

## 2018-03-21 NOTE — PROGRESS NOTES
SUBJECTIVE:   Mae Holder is a 47 year old female who presents to clinic today for the following health issues:          ADDICTION MEDICINE NOTE:    PREVIOUS HISTORY AS BELOW    [place]ES[place]'T REALLY LIKE IT    BENEFITING FROM SUBOXONE     WILL NEED PCP FOR MEDICATIONS - DISCUSS NEXT VISIT      RE-CHECK 4 WEEKS    [place] : NO ISSUES; CHECKED 3/20/18          PREVIOUS HISTORY :    INITIAL VISIT    [place] PATIENT    HERE TO DISCUSS MAT OPTIONS    SEVERAL YR HISTORY OF ADDICTION  ALCOHOL, OPIOIDS DRUGS OF CHOICE    TREATMENT AT [facility] 1 YR AGO  HISTORY OF SUCCESS WITH VIVITROL FOR ALCOHOL DEPENDENCE A FEW YRS AGO; TOOK IT FOR A YEAR    RECENT DETOX, THEN WENT TO THE RETREAT  DEVELOPED DEPRESSION THERE AND [facility]  [facility]    HAVING INTENSE CRAVING FOR ALCOHOL AND OPIOIDS  HISTORY OXYCODONE USE, HEROIN ONLY 1 TIME BUT LOVED IT    DISCUSSED PROS AND CONS OF SUBOXONE VS. VIVITROL  CONSIDERING GOING BACK TO THE RETREAT AFTER [place] BUT CAN'T IF SHE IS ON SUBOXONE  REALIZES SUBOXONE RELIEVES CRAVING BETTER THAN NALTREXONE    ELECTS TO PROCEED WITH SUBOXONE     Problem list and histories reviewed & adjusted, as indicated.  Additional history: as documented    Patient Active Problem List   Diagnosis     Sedative and hypnotic drug poisoning     Depression with suicidal ideation     Chemical dependency (H)     Uncomplicated opioid dependence (H)     Past Surgical History:   Procedure Laterality Date     BUNIONECTOMY  2004, 2010     HYSTERECTOMY  07/2017    Partial       Social History   Substance Use Topics     Smoking status: Current Every Day Smoker     Packs/day: 1.00     Years: 6.00     Types: Cigarettes     Smokeless tobacco: Never Used     Alcohol use Yes     Family History   Problem Relation Age of Onset     Lung Cancer Paternal Grandfather          Current Outpatient Prescriptions   Medication Sig Dispense Refill     buprenorphine  HCl-naloxone HCl (SUBOXONE) 4-1 MG per film Place 1 Film under the tongue 3 times daily 84 Film 0     DULoxetine (CYMBALTA) 60 MG EC capsule Take 1 capsule (60 mg) by mouth daily 30 capsule 0     escitalopram (LEXAPRO) 20 MG tablet Take 1 tablet (20 mg) by mouth daily 30 tablet 0     multivitamin, therapeutic with minerals (THERA-VIT-M) TABS tablet Take 1 tablet by mouth daily 30 each 0     METHYLPHENIDATE HCL PO Take 5 mg by mouth 2 times daily       tretinoin (RETIN-A) 0.01 % topical gel Apply topically At Bedtime       polyethylene glycol (MIRALAX) powder Take 17 g (1 capful) by mouth daily 510 g 1     traZODone (DESYREL) 50 MG tablet Take 1-3 tablets ( mg) by mouth At Bedtime 90 tablet 0     QUETIAPINE FUMARATE PO Take 50 mg by mouth 3 times daily       QUEtiapine (SEROQUEL) 25 MG tablet Take 2 tablets (50 mg) by mouth 3 times daily 90 tablet 1     albuterol (VENTOLIN HFA) 108 (90 BASE) MCG/ACT Inhaler Inhale 1 puff into the lungs every 4 hours as needed for shortness of breath / dyspnea or wheezing       fluticasone (FLOVENT HFA) 220 MCG/ACT Inhaler Inhale 1 puff into the lungs 2 times daily       hydrOXYzine (ATARAX) 25 MG tablet Take 1-2 tablets (25-50 mg) by mouth every 4 hours as needed for anxiety 120 tablet 0     gabapentin (NEURONTIN) 300 MG capsule Take 1 capsule (300 mg) by mouth 3 times daily 90 capsule 0     No Known Allergies  Labs reviewed in EPIC      Reviewed and updated as needed this visit by clinical staffTobacco  Allergies  Meds       Reviewed and updated as needed this visit by Provider         ROS:      OBJECTIVE:     /78  Pulse 95  Resp 18  Wt 140 lb (63.5 kg)  SpO2 97%  BMI 24.03 kg/m2  Body mass index is 24.03 kg/(m^2).     ROS:  Constitutional, HEENT, cardiovascular, pulmonary, gi and gu systems are negative, except as otherwise noted.    /78  Pulse 95  Resp 18  Wt 140 lb (63.5 kg)  SpO2 97%  BMI 24.03 kg/m2  EXAM:  GENERAL APPEARANCE: healthy, alert and  no distress  EYES: Eyes grossly normal to inspection, PERRL and conjunctivae and sclerae normal  NEURO: Normal strength and tone, mentation intact and speech normal  PSYCH: mentation appears normal and affect normal/bright  MENTAL STATUS EXAM:  Appearance/Behavior: No apparent distress and Casually groomed  Speech: Normal  Mood/Affect: normal affect  Insight: Adequate          ASSESSMENT:   OPIOID DEPENDENCE   ALCOHOL DEPENDENCE     PLAN:       ICD-10-CM    1. Uncomplicated opioid dependence (H) F11.20 Urine Drugs of Abuse Screen Panel 13     buprenorphine HCl-naloxone HCl (SUBOXONE) 4-1 MG per film           MEDICATIONS:   Orders Placed This Encounter   Medications     buprenorphine HCl-naloxone HCl (SUBOXONE) 4-1 MG per film     Sig: Place 1 Film under the tongue 3 times daily     Dispense:  84 Film     Refill:  0          - Continue other medications without change  FUTURE APPOINTMENTS:       - Follow-up visit in 4 WEEKS    Mehdi Ross MD  [facility]

## 2018-03-22 ENCOUNTER — COMMUNICATION - HEALTHEAST (OUTPATIENT)
Dept: FAMILY MEDICINE | Facility: CLINIC | Age: 48
End: 2018-03-22

## 2018-03-22 DIAGNOSIS — M25.50 POLYARTHRALGIA: ICD-10-CM

## 2018-03-29 ENCOUNTER — COMMUNICATION - HEALTHEAST (OUTPATIENT)
Dept: FAMILY MEDICINE | Facility: CLINIC | Age: 48
End: 2018-03-29

## 2018-03-29 ENCOUNTER — OFFICE VISIT (OUTPATIENT)
Dept: URGENT CARE | Facility: URGENT CARE | Age: 48
End: 2018-03-29
Payer: COMMERCIAL

## 2018-03-29 VITALS
OXYGEN SATURATION: 95 % | HEART RATE: 66 BPM | DIASTOLIC BLOOD PRESSURE: 60 MMHG | SYSTOLIC BLOOD PRESSURE: 94 MMHG | TEMPERATURE: 97.6 F | BODY MASS INDEX: 24.8 KG/M2 | RESPIRATION RATE: 20 BRPM | WEIGHT: 144.5 LBS

## 2018-03-29 DIAGNOSIS — R60.0 PERIPHERAL EDEMA: ICD-10-CM

## 2018-03-29 DIAGNOSIS — M25.50 ARTHRALGIA, UNSPECIFIED JOINT: ICD-10-CM

## 2018-03-29 DIAGNOSIS — M25.40 SWELLING OF MULTIPLE JOINTS: Primary | ICD-10-CM

## 2018-03-29 LAB
ALBUMIN SERPL-MCNC: 3.7 G/DL (ref 3.4–5)
ALP SERPL-CCNC: 84 U/L (ref 40–150)
ALT SERPL W P-5'-P-CCNC: 25 U/L (ref 0–50)
ANION GAP SERPL CALCULATED.3IONS-SCNC: 4 MMOL/L (ref 3–14)
AST SERPL W P-5'-P-CCNC: 28 U/L (ref 0–45)
BASOPHILS # BLD AUTO: 0 10E9/L (ref 0–0.2)
BASOPHILS NFR BLD AUTO: 0.2 %
BILIRUB SERPL-MCNC: 0.5 MG/DL (ref 0.2–1.3)
BUN SERPL-MCNC: 9 MG/DL (ref 7–30)
CALCIUM SERPL-MCNC: 8.7 MG/DL (ref 8.5–10.1)
CHLORIDE SERPL-SCNC: 108 MMOL/L (ref 94–109)
CK SERPL-CCNC: 121 U/L (ref 30–225)
CO2 SERPL-SCNC: 29 MMOL/L (ref 20–32)
CREAT SERPL-MCNC: 0.7 MG/DL (ref 0.52–1.04)
CRP SERPL-MCNC: 5.6 MG/L (ref 0–8)
DIFFERENTIAL METHOD BLD: ABNORMAL
EOSINOPHIL # BLD AUTO: 0.2 10E9/L (ref 0–0.7)
EOSINOPHIL NFR BLD AUTO: 3.5 %
ERYTHROCYTE [DISTWIDTH] IN BLOOD BY AUTOMATED COUNT: 13 % (ref 10–15)
ERYTHROCYTE [SEDIMENTATION RATE] IN BLOOD BY WESTERGREN METHOD: 10 MM/H (ref 0–20)
GFR SERPL CREATININE-BSD FRML MDRD: 90 ML/MIN/1.7M2
GLUCOSE SERPL-MCNC: 82 MG/DL (ref 70–99)
HCT VFR BLD AUTO: 35.5 % (ref 35–47)
HGB BLD-MCNC: 11.4 G/DL (ref 11.7–15.7)
LYMPHOCYTES # BLD AUTO: 2.2 10E9/L (ref 0.8–5.3)
LYMPHOCYTES NFR BLD AUTO: 35.7 %
MCH RBC QN AUTO: 29.2 PG (ref 26.5–33)
MCHC RBC AUTO-ENTMCNC: 32.1 G/DL (ref 31.5–36.5)
MCV RBC AUTO: 91 FL (ref 78–100)
MONOCYTES # BLD AUTO: 0.5 10E9/L (ref 0–1.3)
MONOCYTES NFR BLD AUTO: 8.1 %
NEUTROPHILS # BLD AUTO: 3.2 10E9/L (ref 1.6–8.3)
NEUTROPHILS NFR BLD AUTO: 52.5 %
PLATELET # BLD AUTO: 256 10E9/L (ref 150–450)
POTASSIUM SERPL-SCNC: 4 MMOL/L (ref 3.4–5.3)
PROT SERPL-MCNC: 6.5 G/DL (ref 6.8–8.8)
RBC # BLD AUTO: 3.91 10E12/L (ref 3.8–5.2)
SODIUM SERPL-SCNC: 141 MMOL/L (ref 133–144)
WBC # BLD AUTO: 6 10E9/L (ref 4–11)

## 2018-03-29 PROCEDURE — 99214 OFFICE O/P EST MOD 30 MIN: CPT | Performed by: PHYSICIAN ASSISTANT

## 2018-03-29 PROCEDURE — 85652 RBC SED RATE AUTOMATED: CPT | Performed by: PHYSICIAN ASSISTANT

## 2018-03-29 PROCEDURE — 80053 COMPREHEN METABOLIC PANEL: CPT | Performed by: PHYSICIAN ASSISTANT

## 2018-03-29 PROCEDURE — 86140 C-REACTIVE PROTEIN: CPT | Performed by: PHYSICIAN ASSISTANT

## 2018-03-29 PROCEDURE — 86618 LYME DISEASE ANTIBODY: CPT | Performed by: PHYSICIAN ASSISTANT

## 2018-03-29 PROCEDURE — 82550 ASSAY OF CK (CPK): CPT | Performed by: PHYSICIAN ASSISTANT

## 2018-03-29 PROCEDURE — 85025 COMPLETE CBC W/AUTO DIFF WBC: CPT | Performed by: PHYSICIAN ASSISTANT

## 2018-03-29 PROCEDURE — 86431 RHEUMATOID FACTOR QUANT: CPT | Performed by: PHYSICIAN ASSISTANT

## 2018-03-29 PROCEDURE — 86666 EHRLICHIA ANTIBODY: CPT | Mod: 90 | Performed by: PHYSICIAN ASSISTANT

## 2018-03-29 PROCEDURE — 99000 SPECIMEN HANDLING OFFICE-LAB: CPT | Performed by: PHYSICIAN ASSISTANT

## 2018-03-29 PROCEDURE — 36415 COLL VENOUS BLD VENIPUNCTURE: CPT | Performed by: PHYSICIAN ASSISTANT

## 2018-03-29 PROCEDURE — 86038 ANTINUCLEAR ANTIBODIES: CPT | Performed by: PHYSICIAN ASSISTANT

## 2018-03-29 RX ORDER — METHYLPREDNISOLONE 4 MG
TABLET, DOSE PACK ORAL
Qty: 21 TABLET | Refills: 0 | Status: SHIPPED | OUTPATIENT
Start: 2018-03-29

## 2018-03-29 NOTE — MR AVS SNAPSHOT
"              After Visit Summary   3/29/2018    Mae Holder    MRN: [id number]           Patient Information     Date Of Birth          1970        Visit Information        Provider Department      3/29/2018 3:30 PM Maximino Stewart PA-C [facility]        Today's Diagnoses     Swelling of multiple joints    -  1       Follow-ups after your visit        Your next 10 appointments already scheduled     Apr 17, 2018 10:45 AM CDT   Return Visit with Mehdi Ross MD   [facility]    [address] [place], [place] [address]   [address]  [facility] [address] [phone]              Who to contact     If you have questions or need follow up information about today's clinic visit or your schedule please contact [facility] directly at [phone].  Normal or non-critical lab and imaging results will be communicated to you by MyChart, letter or phone within 4 business days after the clinic has received the results. If you do not hear from us within 7 days, please contact the clinic through MyChart or phone. If you have a critical or abnormal lab result, we will notify you by phone as soon as possible.  Submit refill requests through Ar[employer] or call your pharmacy and they will forward the refill request to us. Please allow 3 business days for your refill to be completed.          Additional Information About Your Visit        MyChart Information     [employer] lets you send messages to your doctor, view your test results, renew your prescriptions, schedule appointments and more. To sign up, go to [email or web address] . Click on \"Log in\" on the left side of the screen, which will take you to the Welcome page. Then click on \"Sign up Now\" on the right side of the page.     You will be asked to enter the access code listed below, as well as some personal information. Please follow the directions to create " your username and password.     Your access code is: [email or web address]  Expires: 2018  1:39 PM     Your access code will  in 90 days. If you need help or a new code, please call your [facility] or [phone].        Care EveryWhere ID     This is your Care EveryWhere ID. This could be used by other organizations to access your [place] medical records  [id number]        Your Vitals Were     Pulse Temperature Respirations Pulse Oximetry BMI (Body Mass Index)       66 97.6  F (36.4  C) (Oral) 20 95% 24.8 kg/m2        Blood Pressure from Last 3 Encounters:   18 94/60   18 122/78   18 108/64    Weight from Last 3 Encounters:   18 144 lb 8 oz (65.5 kg)   18 140 lb (63.5 kg)   18 137 lb 8 oz (62.4 kg)              We Performed the Following     Anti Nuclear Candace IgG by IFA with Reflex     CBC with platelets differential     CK total     Comprehensive metabolic panel     CRP, inflammation     Ehrlichia chaffeenis Abys IgG and IgM     Erythrocyte sedimentation rate auto     Lyme Disease Candace with reflex to WB Serum     Rheumatoid factor          Today's Medication Changes          These changes are accurate as of 3/29/18 11:59 PM.  If you have any questions, ask your nurse or doctor.               Start taking these medicines.        Dose/Directions    methylPREDNISolone 4 MG tablet   Commonly known as:  MEDROL DOSEPAK   Used for:  Swelling of multiple joints   Started by:  Maximino Stewart, PAJoeC        Follow package instructions   Quantity:  21 tablet   Refills:  0            Where to get your medicines      These medications were sent to [facility] - [place], [place] - [address], [facility] [address]     Phone:  [phone]     methylPREDNISolone 4 MG tablet                Primary Care Provider Office Phone # Fax #    Natty Barone [phone]       [facility] [address] [place] [place]  [address]        Equal Access to Services     [facility] : Hadii gaurav musa allen Pittsali, waubaldoda luevansgloha, qaparvinkaley schmidtrejiismael palmer. So [facility] [phone].    ATENCIÓN: Si habla español, tiene a polo disposición servicios gratuitos de asistencia lingüística. Lesly al [phone].    We comply with applicable federal civil rights laws and [place] laws. We do not discriminate on the basis of race, color, national origin, age, disability, sex, sexual orientation, or gender identity.            Thank you!     Thank you for choosing [place] [facility]  for your care. Our goal is always to provide you with excellent care. Hearing back from our patients is one way we can continue to improve our services. Please take a few minutes to complete the written survey that you may receive in the mail after your visit with us. Thank you!             Your Updated Medication List - Protect others around you: Learn how to safely use, store and throw away your medicines at www.disposemymeds.org.          This list is accurate as of 3/29/18 11:59 PM.  Always use your most recent med list.                   Brand Name Dispense Instructions for use Diagnosis    buprenorphine HCl-naloxone HCl 4-1 MG per film    SUBOXONE    84 Film    Place 1 Film under the tongue 3 times daily    Uncomplicated opioid dependence (H)       DULoxetine 60 MG EC capsule    CYMBALTA    30 capsule    Take 1 capsule (60 mg) by mouth daily    Depression with suicidal ideation       escitalopram 20 MG tablet    LEXAPRO    30 tablet    Take 1 tablet (20 mg) by mouth daily    Sedative, hypnotic or anxiolytic dependence (H)       fluticasone 220 MCG/ACT Inhaler    FLOVENT HFA     Inhale 1 puff into the lungs 2 times daily        gabapentin 300 MG capsule    NEURONTIN    90 capsule    Take 1 capsule (300 mg) by mouth 3 times daily    Sedative, hypnotic or anxiolytic dependence (H)       hydrOXYzine 25 MG  tablet    ATARAX    120 tablet    Take 1-2 tablets (25-50 mg) by mouth every 4 hours as needed for anxiety    Sedative, hypnotic or anxiolytic dependence (H)       METHYLPHENIDATE HCL PO      Take 5 mg by mouth 2 times daily        methylPREDNISolone 4 MG tablet    MEDROL DOSEPAK    21 tablet    Follow package instructions    Swelling of multiple joints       multivitamin, therapeutic with minerals Tabs tablet     30 each    Take 1 tablet by mouth daily    Sedative, hypnotic or anxiolytic dependence (H)       polyethylene glycol powder    MIRALAX    510 g    Take 17 g (1 capful) by mouth daily    Slow transit constipation       * QUETIAPINE FUMARATE PO      Take 50 mg by mouth 3 times daily        * QUEtiapine 25 MG tablet    SEROquel    90 tablet    Take 2 tablets (50 mg) by mouth 3 times daily    Depression with suicidal ideation       traZODone 50 MG tablet    DESYREL    90 tablet    Take 1-3 tablets ( mg) by mouth At Bedtime    Sedative, hypnotic or anxiolytic dependence (H)       tretinoin 0.01 % topical gel    RETIN-A     Apply topically At Bedtime        VENTOLIN  (90 BASE) MCG/ACT Inhaler   Generic drug:  albuterol      Inhale 1 puff into the lungs every 4 hours as needed for shortness of breath / dyspnea or wheezing        * Notice:  This list has 2 medication(s) that are the same as other medications prescribed for you. Read the directions carefully, and ask your doctor or other care provider to review them with you.

## 2018-03-30 LAB
ANA SER QL IF: NEGATIVE
B BURGDOR IGG+IGM SER QL: 0.03 (ref 0–0.89)
RHEUMATOID FACT SER NEPH-ACNC: <20 IU/ML (ref 0–20)

## 2018-04-01 LAB
E CHAFFEENSIS IGG TITR SER: NORMAL {TITER}
E CHAFFEENSIS IGM TITR SER: NORMAL {TITER}

## 2018-04-02 NOTE — PROGRESS NOTES
SUBJECTIVE:  Chief Complaint   Patient presents with     Swelling     Bilateral swelling of hands and foot.      Mae Holder is a 47 year old female presents with a chief complaint of bilateral upper and lower extremity joint pain and swelling .   How: no known injury.  The patient complained of mild pain  and has not had decreased ROM.  Pain exacerbated by movement.  Relieved by rest.  She treated it initially with no therapy. This is the first time this type of injury has occurred to this patient.     Past Medical History:   Diagnosis Date     Anxiety      Asthma      Depression      Substance abuse      ALLERGIES  No Known Allergies     Social History   Substance Use Topics     Smoking status: Current Every Day Smoker     Packs/day: 1.00     Years: 6.00     Types: Cigarettes     Smokeless tobacco: Never Used     Alcohol use Yes       ROS:  CONSTITUTIONAL:NEGATIVE for fever, chills, change in weight  INTEGUMENTARY/SKIN: NEGATIVE for worrisome rashes, moles or lesions  ENT/MOUTH: NEGATIVE for ear, mouth and throat problems  RESP:NEGATIVE for significant cough or SOB  CV: NEGATIVE for chest pain, palpitations or peripheral edema  GI: NEGATIVE for nausea, abdominal pain, heartburn, or change in bowel habits  MUSCULOSKELETAL: POSITIVE  for multiple joint pains  NEURO: NEGATIVE for weakness, dizziness or paresthesias    EXAM:   BP 94/60  Pulse 66  Temp 97.6  F (36.4  C) (Oral)  Resp 20  Wt 144 lb 8 oz (65.5 kg)  SpO2 95%  BMI 24.8 kg/m2  Gen: healthy,alert,no distress  Extremity: joint pains upper and lower extremities all joints .   There is not compromise to the distal circulation.  Pulses are +2 and CRT is brisk  GENERAL APPEARANCE: healthy, alert and no distress  EXTREMITIES: peripheral pulses normal  MS:  Positive for tenderness and swelling of all her joints  SKIN: no suspicious lesions or rashes  NEURO: Normal strength and tone, sensory exam grossly normal, mentation intact and speech normal    Results  for orders placed or performed in visit on 03/29/18   Lyme Disease Teddy with reflex to WB Serum   Result Value Ref Range    Lyme Disease Antibodies Serum 0.03 0.00 - 0.89   Ehrlichia chaffeenis Abys IgG and IgM   Result Value Ref Range    Ehrlichia TEDDY IgG <1:64 <1:64    Ehrlichia TEDDY IgM < 1:16 <1:16   Erythrocyte sedimentation rate auto   Result Value Ref Range    Sed Rate 10 0 - 20 mm/h   CRP, inflammation   Result Value Ref Range    CRP Inflammation 5.6 0.0 - 8.0 mg/L   CK total   Result Value Ref Range    CK Total 121 30 - 225 U/L   Comprehensive metabolic panel   Result Value Ref Range    Sodium 141 133 - 144 mmol/L    Potassium 4.0 3.4 - 5.3 mmol/L    Chloride 108 94 - 109 mmol/L    Carbon Dioxide 29 20 - 32 mmol/L    Anion Gap 4 3 - 14 mmol/L    Glucose 82 70 - 99 mg/dL    Urea Nitrogen 9 7 - 30 mg/dL    Creatinine 0.70 0.52 - 1.04 mg/dL    GFR Estimate 90 >60 mL/min/1.7m2    GFR Estimate If Black >90 >60 mL/min/1.7m2    Calcium 8.7 8.5 - 10.1 mg/dL    Bilirubin Total 0.5 0.2 - 1.3 mg/dL    Albumin 3.7 3.4 - 5.0 g/dL    Protein Total 6.5 (L) 6.8 - 8.8 g/dL    Alkaline Phosphatase 84 40 - 150 U/L    ALT 25 0 - 50 U/L    AST 28 0 - 45 U/L   CBC with platelets differential   Result Value Ref Range    WBC 6.0 4.0 - 11.0 10e9/L    RBC Count 3.91 3.8 - 5.2 10e12/L    Hemoglobin 11.4 (L) 11.7 - 15.7 g/dL    Hematocrit 35.5 35.0 - 47.0 %    MCV 91 78 - 100 fl    MCH 29.2 26.5 - 33.0 pg    MCHC 32.1 31.5 - 36.5 g/dL    RDW 13.0 10.0 - 15.0 %    Platelet Count 256 150 - 450 10e9/L    Diff Method Automated Method     % Neutrophils 52.5 %    % Lymphocytes 35.7 %    % Monocytes 8.1 %    % Eosinophils 3.5 %    % Basophils 0.2 %    Absolute Neutrophil 3.2 1.6 - 8.3 10e9/L    Absolute Lymphocytes 2.2 0.8 - 5.3 10e9/L    Absolute Monocytes 0.5 0.0 - 1.3 10e9/L    Absolute Eosinophils 0.2 0.0 - 0.7 10e9/L    Absolute Basophils 0.0 0.0 - 0.2 10e9/L   Rheumatoid factor   Result Value Ref Range    Rheumatoid Factor <20 <20 IU/mL    Anti Nuclear Candace IgG by IFA with Reflex   Result Value Ref Range    MEAGAN interpretation Negative NEG^Negative         ASSESSMENT/PLAN:    ICD-10-CM    1. Swelling of multiple joints M25.40 Lyme Disease Candace with reflex to WB Serum     Ehrlichia chaffeenis Abys IgG and IgM     Erythrocyte sedimentation rate auto     CRP, inflammation     CK total     Comprehensive metabolic panel     CBC with platelets differential     Rheumatoid factor     Anti Nuclear Candace IgG by IFA with Reflex     methylPREDNISolone (MEDROL DOSEPAK) 4 MG tablet       Advised to follow up with PCP for recheck  Go to the ED if symptoms worsen

## 2018-04-17 ENCOUNTER — OFFICE VISIT (OUTPATIENT)
Dept: ADDICTION MEDICINE | Facility: CLINIC | Age: 48
End: 2018-04-17
Payer: COMMERCIAL

## 2018-04-17 VITALS
RESPIRATION RATE: 14 BRPM | HEART RATE: 59 BPM | OXYGEN SATURATION: 99 % | SYSTOLIC BLOOD PRESSURE: 98 MMHG | BODY MASS INDEX: 24.2 KG/M2 | TEMPERATURE: 98.1 F | WEIGHT: 141 LBS | DIASTOLIC BLOOD PRESSURE: 64 MMHG

## 2018-04-17 DIAGNOSIS — F11.20 UNCOMPLICATED OPIOID DEPENDENCE (H): ICD-10-CM

## 2018-04-17 PROCEDURE — 99214 OFFICE O/P EST MOD 30 MIN: CPT | Performed by: FAMILY MEDICINE

## 2018-04-17 PROCEDURE — 80306 DRUG TEST PRSMV INSTRMNT: CPT | Performed by: FAMILY MEDICINE

## 2018-04-17 RX ORDER — BUPRENORPHINE AND NALOXONE 4; 1 MG/1; MG/1
1 FILM, SOLUBLE BUCCAL; SUBLINGUAL 3 TIMES DAILY
Qty: 84 FILM | Refills: 0 | Status: SHIPPED | OUTPATIENT
Start: 2018-04-17 | End: 2018-05-15

## 2018-04-17 NOTE — MR AVS SNAPSHOT
After Visit Summary   4/17/2018    Mae Holder    MRN: [id number]           Patient Information     Date Of Birth          1970        Visit Information        Provider Department      4/17/2018 10:45 AM Mehdi Ross MD [facility]        Today's Diagnoses     Uncomplicated opioid dependence (H)          Care Instructions    Continue your Buprenorphine 4 mg  films/tabs  3 times daily     Follow up 1 month    A prescription has been sent to your pharmacy of choice.  If a prior authorization is required it may take several days to get your medication.  Please make sure your pharmacy had your contact information so they can contact you when it is ready to     You are at risk for overdose  ( including risk of death)  with return to use of opioids after a period of abstinence because your tolerance will have decreased dramatically.      It is strongly recommended that you abstain from alcohol, benzodiazepines (Xanax Valium, Klonipin) , THC, opioids and other drugs of abuse.  Use of these substances increases your risk of relapse for opioids.  Using these substances with Buprenorphine also incresaes your risk of overdose/death (especially alcohol/benzodiazepines).     You are encouraged to have some type of recovery program in addition to medication treatment.  Medication alone is generally not enough to lead to long term recovery.  This may include having some type of sober network, avoiding isolating, avoiding triggers (people, places, things you associate with using opioids).   Such supports may include Alcoholics Anonymous, Narcotics anonymous or other self help organizations as well as counseling.  We can help provide resources to these services.      Narcan kit prescriptions are available if you do not have one.       The addiction medicine clinic number is [phone].    If you cannot make your appointment please call the office and reschedule  immediately. If you are out of medication a bridge can be sent to your pharmacy to last until the date of your rescheduled appointment. Our clinic is open from Monday-Friday 0800-4:30pm and there is not an ON CALL after hours service.  If medical care is needed after hours or on the weekend you will need to contact your primary care physician or go to an Urgent Care or ER.     [employer] messages and telephone calls from patients are taken care of by the nursing team within 24 business hours if received between Monday 8am - Friday 4:30pm. Therefore if a refill/bridge is needed it is important to call in advance so you do not run out of medications.     [facility] does not accept [employer] or [employer] Medical assistance insurance.                      Follow-ups after your visit        Your next 10 appointments already scheduled     May 15, 2018 11:15 AM CDT   Return Visit with Mehdi Ross MD   [facility] ([facility])    [address] [place] [address]  [address]  [facility] [id number] [phone]              Who to contact     If you have questions or need follow up information about today's clinic visit or your schedule please contact [facility] directly at [phone].  Normal or non-critical lab and imaging results will be communicated to you by [employer], letter or phone within 4 business days after the clinic has received the results. If you do not hear from us within 7 days, please contact the clinic through [employer] or phone. If you have a critical or abnormal lab result, we will notify you by phone as soon as possible.  Submit refill requests through [employer]ari[employer] or call your pharmacy and they will forward the refill request to us. Please allow 3 business days for your refill to be completed.          Additional Information About Your Visit        ClariFI Information     Clari[employer] lets you send messages to your  "doctor, view your test results, renew your prescriptions, schedule appointments and more. To sign up, go to [email or web address] [employer] . Click on \"Log in\" on the left side of the screen, which will take you to the Welcome page. Then click on \"Sign up Now\" on the right side of the page.     You will be asked to enter the access code listed below, as well as some personal information. Please follow the directions to create your username and password.     Your access code is: [email or web address]  Expires: 2018  1:39 PM     Your access code will  in 90 days. If you need help or a new code, please call your [place] clinic or [phone].        Care EveryWhere ID     This is your Care EveryWhere ID. This could be used by other organizations to access your [place] medical records  [id number]        Your Vitals Were     Pulse Temperature Respirations Pulse Oximetry BMI (Body Mass Index)       59 98.1  F (36.7  C) (Oral) 14 99% 24.2 kg/m2        Blood Pressure from Last 3 Encounters:   18 98/64   18 94/60   18 122/78    Weight from Last 3 Encounters:   18 141 lb (64 kg)   18 144 lb 8 oz (65.5 kg)   18 140 lb (63.5 kg)              We Performed the Following     Urine Drugs of Abuse Screen Panel 13          Where to get your medicines      Some of these will need a paper prescription and others can be bought over the counter.  Ask your nurse if you have questions.     Bring a paper prescription for each of these medications     buprenorphine HCl-naloxone HCl 4-1 MG per film          Primary Care Provider Office Phone # Fax #    Natty Barone [phone]       [facility] [address] [place] [address]        Equal Access to Services     [facility] : Bhavna Oro, edita schwartz, yoselin burgess, ismael melgar. [facility] [phone].    ATENCIÓN: Si habla español, tiene a polo disposición servicios " juan de asistencia lingüística. Lesly kaur [phone].    We comply with applicable federal civil rights laws and [place] laws. We do not discriminate on the basis of race, color, national origin, age, disability, sex, sexual orientation, or gender identity.            Thank you!     Thank you for choosing [facility]  for your care. Our goal is always to provide you with excellent care. Hearing back from our patients is one way we can continue to improve our services. Please take a few minutes to complete the written survey that you may receive in the mail after your visit with us. Thank you!             Your Updated Medication List - Protect others around you: Learn how to safely use, store and throw away your medicines at www.disposemymeds.org.          This list is accurate as of 4/17/18  1:33 PM.  Always use your most recent med list.                   Brand Name Dispense Instructions for use Diagnosis    buprenorphine HCl-naloxone HCl 4-1 MG per film    SUBOXONE    84 Film    Place 1 Film under the tongue 3 times daily    Uncomplicated opioid dependence (H)       DULoxetine 60 MG EC capsule    CYMBALTA    30 capsule    Take 1 capsule (60 mg) by mouth daily    Depression with suicidal ideation       escitalopram 20 MG tablet    LEXAPRO    30 tablet    Take 1 tablet (20 mg) by mouth daily    Sedative, hypnotic or anxiolytic dependence (H)       fluticasone 220 MCG/ACT Inhaler    FLOVENT HFA     Inhale 1 puff into the lungs 2 times daily        gabapentin 300 MG capsule    NEURONTIN    90 capsule    Take 1 capsule (300 mg) by mouth 3 times daily    Sedative, hypnotic or anxiolytic dependence (H)       hydrOXYzine 25 MG tablet    ATARAX    120 tablet    Take 1-2 tablets (25-50 mg) by mouth every 4 hours as needed for anxiety    Sedative, hypnotic or anxiolytic dependence (H)       METHYLPHENIDATE HCL PO      Take 5 mg by mouth 2 times daily        methylPREDNISolone 4 MG tablet     MEDROL DOSEPAK    21 tablet    Follow package instructions    Swelling of multiple joints       multivitamin, therapeutic with minerals Tabs tablet     30 each    Take 1 tablet by mouth daily    Sedative, hypnotic or anxiolytic dependence (H)       polyethylene glycol powder    MIRALAX    510 g    Take 17 g (1 capful) by mouth daily    Slow transit constipation       * QUETIAPINE FUMARATE PO      Take 50 mg by mouth 3 times daily        * QUEtiapine 25 MG tablet    SEROquel    90 tablet    Take 2 tablets (50 mg) by mouth 3 times daily    Depression with suicidal ideation       traZODone 50 MG tablet    DESYREL    90 tablet    Take 1-3 tablets ( mg) by mouth At Bedtime    Sedative, hypnotic or anxiolytic dependence (H)       tretinoin 0.01 % topical gel    RETIN-A     Apply topically At Bedtime        VENTOLIN  (90 Base) MCG/ACT Inhaler   Generic drug:  albuterol      Inhale 1 puff into the lungs every 4 hours as needed for shortness of breath / dyspnea or wheezing        * Notice:  This list has 2 medication(s) that are the same as other medications prescribed for you. Read the directions carefully, and ask your doctor or other care provider to review them with you.

## 2018-04-17 NOTE — NURSING NOTE
"Chief Complaint   Patient presents with     Addiction Problem       Initial BP 98/64 (BP Location: Left arm)  Pulse 59  Temp 98.1  F (36.7  C) (Oral)  Resp 14  Wt 141 lb (64 kg)  SpO2 99%  BMI 24.2 kg/m2 Estimated body mass index is 24.2 kg/(m^2) as calculated from the following:    Height as of 12/26/17: 5' 4\" (1.626 m).    Weight as of this encounter: 141 lb (64 kg).  Medication Reconciliation: complete   Tammie Garcia CMA      "

## 2018-04-17 NOTE — PATIENT INSTRUCTIONS
Continue your Buprenorphine 4 mg  films/tabs  3 times daily     Follow up 1 month    A prescription has been sent to your pharmacy of choice.  If a prior authorization is required it may take several days to get your medication.  Please make sure your pharmacy had your contact information so they can contact you when it is ready to     You are at risk for overdose  ( including risk of death)  with return to use of opioids after a period of abstinence because your tolerance will have decreased dramatically.      It is strongly recommended that you abstain from alcohol, benzodiazepines (Xanax Valium, Klonipin) , THC, opioids and other drugs of abuse.  Use of these substances increases your risk of relapse for opioids.  Using these substances with Buprenorphine also incresaes your risk of overdose/death (especially alcohol/benzodiazepines).     You are encouraged to have some type of recovery program in addition to medication treatment.  Medication alone is generally not enough to lead to long term recovery.  This may include having some type of sober network, avoiding isolating, avoiding triggers (people, places, things you associate with using opioids).   Such supports may include Alcoholics Anonymous, Narcotics anonymous or other self help organizations as well as counseling.  We can help provide resources to these services.      Narcan kit prescriptions are available if you do not have one.       The addiction medicine clinic number is [phone].    If you cannot make your appointment please call the office and reschedule immediately. If you are out of medication a bridge can be sent to your pharmacy to last until the date of your rescheduled appointment. Our clinic is open from Monday-Friday 0800-4:30pm and there is not an ON CALL after hours service.  If medical care is needed after hours or on the weekend you will need to contact your primary care physician or go to an Urgent Care or ER.     Jose  messages and telephone calls from patients are taken care of by the nursing team within 24 business hours if received between Monday 8am - Friday 4:30pm. Therefore if a refill/bridge is needed it is important to call in advance so you do not run out of medications.     [facility] does not accept [facility] or [employer] Medical assistance insurance.

## 2018-04-18 NOTE — PROGRESS NOTES
SUBJECTIVE:   Mae Holder is a 47 year old female who presents to clinic today for the following health issues:          ADDICTION MEDICINE NOTE:    PREVIOUS HISTORY AS BELOW    [place]    WILL BE THERE ANOTHER 6 WEEKS, THEN WILL MOVE TO SOBER LIVING    DISCUSSED FINDING A PLACE THAT ACCEPTS SUBOXONE     DISCUSSED OPIOID EPIDEMIC AND BEST PRACTICE OF MAT    CONTINUE SAME    RE-CHECK 1 MONTH      RE-CHECK 4 WEEKS    [place] : NO ISSUES; CHECKED 4/18/18; GABAPENTIN 300 MG, 6 PER DAY FROM ANOTHER PROVIDER - REVIEW NEXT VISIT          PREVIOUS HISTORY :    INITIAL VISIT    [facility] PATIENT    HERE TO DISCUSS MAT OPTIONS    SEVERAL YR HISTORY OF ADDICTION  ALCOHOL, OPIOIDS DRUGS OF CHOICE    TREATMENT AT [facility] 1 YR AGO  HISTORY OF SUCCESS WITH VIVITROL FOR ALCOHOL DEPENDENCE A FEW YRS AGO; TOOK IT FOR A YEAR    RECENT DETOX, THEN WENT TO THE RETREAT  DEVELOPED DEPRESSION THERE AND WAS HOSPITALIZED AT [facility] FOR A SHORT TIME  [facility] IN [facility]    HAVING INTENSE CRAVING FOR ALCOHOL AND OPIOIDS  HISTORY OXYCODONE USE, HEROIN ONLY 1 TIME BUT LOVED IT    DISCUSSED PROS AND CONS OF SUBOXONE VS. VIVITROL  CONSIDERING GOING BACK TO THE RETREAT AFTER [place] [place] BUT CAN'T IF SHE IS ON SUBOXONE  REALIZES SUBOXONE RELIEVES CRAVING BETTER THAN NALTREXONE    ELECTS TO PROCEED WITH SUBOXONE     Problem list and histories reviewed & adjusted, as indicated.  Additional history: as documented    Patient Active Problem List   Diagnosis     Sedative and hypnotic drug poisoning     Depression with suicidal ideation     Chemical dependency (H)     Uncomplicated opioid dependence (H)     Past Surgical History:   Procedure Laterality Date     BUNIONECTOMY  2004, 2010     HYSTERECTOMY  07/2017    Partial       Social History   Substance Use Topics     Smoking status: Current Every Day Smoker     Packs/day: 1.00     Years: 6.00     Types: Cigarettes     Smokeless tobacco: Never Used     Alcohol use Yes      Family History   Problem Relation Age of Onset     Lung Cancer Paternal Grandfather          Current Outpatient Prescriptions   Medication Sig Dispense Refill     albuterol (VENTOLIN HFA) 108 (90 BASE) MCG/ACT Inhaler Inhale 1 puff into the lungs every 4 hours as needed for shortness of breath / dyspnea or wheezing       buprenorphine HCl-naloxone HCl (SUBOXONE) 4-1 MG per film Place 1 Film under the tongue 3 times daily 84 Film 0     DULoxetine (CYMBALTA) 60 MG EC capsule Take 1 capsule (60 mg) by mouth daily 30 capsule 0     escitalopram (LEXAPRO) 20 MG tablet Take 1 tablet (20 mg) by mouth daily 30 tablet 0     fluticasone (FLOVENT HFA) 220 MCG/ACT Inhaler Inhale 1 puff into the lungs 2 times daily       gabapentin (NEURONTIN) 300 MG capsule Take 1 capsule (300 mg) by mouth 3 times daily 90 capsule 0     hydrOXYzine (ATARAX) 25 MG tablet Take 1-2 tablets (25-50 mg) by mouth every 4 hours as needed for anxiety 120 tablet 0     METHYLPHENIDATE HCL PO Take 5 mg by mouth 2 times daily       methylPREDNISolone (MEDROL DOSEPAK) 4 MG tablet Follow package instructions 21 tablet 0     multivitamin, therapeutic with minerals (THERA-VIT-M) TABS tablet Take 1 tablet by mouth daily 30 each 0     polyethylene glycol (MIRALAX) powder Take 17 g (1 capful) by mouth daily 510 g 1     QUEtiapine (SEROQUEL) 25 MG tablet Take 2 tablets (50 mg) by mouth 3 times daily 90 tablet 1     QUETIAPINE FUMARATE PO Take 50 mg by mouth 3 times daily       traZODone (DESYREL) 50 MG tablet Take 1-3 tablets ( mg) by mouth At Bedtime 90 tablet 0     tretinoin (RETIN-A) 0.01 % topical gel Apply topically At Bedtime       No Known Allergies  Labs reviewed in EPIC      Reviewed and updated as needed this visit by clinical staffTobacco  Allergies  Meds       Reviewed and updated as needed this visit by Provider  Tobacco         ROS:      OBJECTIVE:     BP 98/64 (BP Location: Left arm)  Pulse 59  Temp 98.1  F (36.7  C) (Oral)  Resp 14  Wt  141 lb (64 kg)  SpO2 99%  BMI 24.2 kg/m2  Body mass index is 24.2 kg/(m^2).     ROS:  Constitutional, HEENT, cardiovascular, pulmonary, gi and gu systems are negative, except as otherwise noted.    BP 98/64 (BP Location: Left arm)  Pulse 59  Temp 98.1  F (36.7  C) (Oral)  Resp 14  Wt 141 lb (64 kg)  SpO2 99%  BMI 24.2 kg/m2  EXAM:  GENERAL APPEARANCE: healthy, alert and no distress  EYES: Eyes grossly normal to inspection, PERRL and conjunctivae and sclerae normal  NEURO: Normal strength and tone, mentation intact and speech normal  PSYCH: mentation appears normal and affect normal/bright  MENTAL STATUS EXAM:  Appearance/Behavior: No apparent distress and Casually groomed  Speech: Normal  Mood/Affect: normal affect  Insight: Adequate          ASSESSMENT:   OPIOID DEPENDENCE   ALCOHOL DEPENDENCE     PLAN:       ICD-10-CM    1. Uncomplicated opioid dependence (H) F11.20 Urine Drugs of Abuse Screen Panel 13     buprenorphine HCl-naloxone HCl (SUBOXONE) 4-1 MG per film           MEDICATIONS:   Orders Placed This Encounter   Medications     buprenorphine HCl-naloxone HCl (SUBOXONE) 4-1 MG per film     Sig: Place 1 Film under the tongue 3 times daily     Dispense:  84 Film     Refill:  0          - Continue other medications without change  FUTURE APPOINTMENTS:       - Follow-up visit in 4 WEEKS    Mehdi Ross MD  [facility]

## 2018-04-19 ENCOUNTER — COMMUNICATION - HEALTHEAST (OUTPATIENT)
Dept: SCHEDULING | Facility: CLINIC | Age: 48
End: 2018-04-19

## 2018-04-24 ENCOUNTER — COMMUNICATION - HEALTHEAST (OUTPATIENT)
Dept: FAMILY MEDICINE | Facility: CLINIC | Age: 48
End: 2018-04-24

## 2018-04-26 ENCOUNTER — COMMUNICATION - HEALTHEAST (OUTPATIENT)
Dept: FAMILY MEDICINE | Facility: CLINIC | Age: 48
End: 2018-04-26

## 2018-04-26 ENCOUNTER — OFFICE VISIT - HEALTHEAST (OUTPATIENT)
Dept: FAMILY MEDICINE | Facility: CLINIC | Age: 48
End: 2018-04-26

## 2018-04-26 DIAGNOSIS — F90.0 ATTENTION DEFICIT HYPERACTIVITY DISORDER (ADHD), PREDOMINANTLY INATTENTIVE TYPE: ICD-10-CM

## 2018-04-26 DIAGNOSIS — R21 RASH: ICD-10-CM

## 2018-04-26 DIAGNOSIS — L70.9 ACNE: ICD-10-CM

## 2018-04-26 DIAGNOSIS — J45.909 ASTHMA: ICD-10-CM

## 2018-04-26 DIAGNOSIS — M25.50 ARTHRALGIA, UNSPECIFIED JOINT: ICD-10-CM

## 2018-04-26 DIAGNOSIS — F41.9 ANXIETY: ICD-10-CM

## 2018-04-26 DIAGNOSIS — L40.3 PUSTULOSIS PALMARIS ET PLANTARIS: ICD-10-CM

## 2018-04-26 DIAGNOSIS — F33.1 MDD (MAJOR DEPRESSIVE DISORDER), RECURRENT EPISODE, MODERATE (H): ICD-10-CM

## 2018-05-04 ENCOUNTER — COMMUNICATION - HEALTHEAST (OUTPATIENT)
Dept: FAMILY MEDICINE | Facility: CLINIC | Age: 48
End: 2018-05-04

## 2018-05-04 DIAGNOSIS — F41.1 ANXIETY STATE: ICD-10-CM

## 2018-05-04 DIAGNOSIS — F33.1 MDD (MAJOR DEPRESSIVE DISORDER), RECURRENT EPISODE, MODERATE (H): ICD-10-CM

## 2018-05-11 ENCOUNTER — COMMUNICATION - HEALTHEAST (OUTPATIENT)
Dept: FAMILY MEDICINE | Facility: CLINIC | Age: 48
End: 2018-05-11

## 2018-05-11 DIAGNOSIS — F41.1 ANXIETY STATE: ICD-10-CM

## 2018-05-15 ENCOUNTER — COMMUNICATION - HEALTHEAST (OUTPATIENT)
Dept: FAMILY MEDICINE | Facility: CLINIC | Age: 48
End: 2018-05-15

## 2018-05-15 ENCOUNTER — OFFICE VISIT (OUTPATIENT)
Dept: ADDICTION MEDICINE | Facility: CLINIC | Age: 48
End: 2018-05-15
Payer: COMMERCIAL

## 2018-05-15 VITALS
DIASTOLIC BLOOD PRESSURE: 58 MMHG | RESPIRATION RATE: 14 BRPM | HEART RATE: 72 BPM | SYSTOLIC BLOOD PRESSURE: 98 MMHG | TEMPERATURE: 98.8 F | OXYGEN SATURATION: 99 % | BODY MASS INDEX: 25.23 KG/M2 | WEIGHT: 147 LBS

## 2018-05-15 DIAGNOSIS — F11.20 UNCOMPLICATED OPIOID DEPENDENCE (H): ICD-10-CM

## 2018-05-15 PROCEDURE — 99214 OFFICE O/P EST MOD 30 MIN: CPT | Performed by: FAMILY MEDICINE

## 2018-05-15 PROCEDURE — 80306 DRUG TEST PRSMV INSTRMNT: CPT | Performed by: FAMILY MEDICINE

## 2018-05-15 RX ORDER — BUPRENORPHINE AND NALOXONE 4; 1 MG/1; MG/1
1 FILM, SOLUBLE BUCCAL; SUBLINGUAL 3 TIMES DAILY
Qty: 84 FILM | Refills: 0 | Status: SHIPPED | OUTPATIENT
Start: 2018-05-15

## 2018-05-15 NOTE — PATIENT INSTRUCTIONS
Continue your Buprenorphine 4 mg  films/tabs  3 times daily     Follow up 1 month    A prescription has been sent to your pharmacy of choice.  If a prior authorization is required it may take several days to get your medication.  Please make sure your pharmacy had your contact information so they can contact you when it is ready to     You are at risk for overdose  ( including risk of death)  with return to use of opioids after a period of abstinence because your tolerance will have decreased dramatically.      It is strongly recommended that you abstain from alcohol, benzodiazepines (Xanax Valium, Klonipin) , THC, opioids and other drugs of abuse.  Use of these substances increases your risk of relapse for opioids.  Using these substances with Buprenorphine also incresaes your risk of overdose/death (especially alcohol/benzodiazepines).     You are encouraged to have some type of recovery program in addition to medication treatment.  Medication alone is generally not enough to lead to long term recovery.  This may include having some type of sober network, avoiding isolating, avoiding triggers (people, places, things you associate with using opioids).   Such supports may include Alcoholics Anonymous, Narcotics anonymous or other self help organizations as well as counseling.  We can help provide resources to these services.      Narcan kit prescriptions are available if you do not have one.       The addiction medicine clinic number is [phone].    If you cannot make your appointment please call the office and reschedule immediately. If you are out of medication a bridge can be sent to your pharmacy to last until the date of your rescheduled appointment. Our clinic is open from Monday-Friday 0800-4:30pm and there is not an ON CALL after hours service.  If medical care is needed after hours or on the weekend you will need to contact your primary care physician or go to an Urgent Care or ER.     Jose  messages and telephone calls from patients are taken care of by the nursing team within 24 business hours if received between Monday 8am - Friday 4:30pm. Therefore if a refill/bridge is needed it is important to call in advance so you do not run out of medications.     [facility] does not accept [facility] or [employer] assistance insurance.

## 2018-05-15 NOTE — MR AVS SNAPSHOT
After Visit Summary   5/15/2018    Mae Holder    MRN: [id number]           Patient Information     Date Of Birth          1970        Visit Information        Provider Department      5/15/2018 11:15 AM Mehdi Ross MD [facility]        Today's Diagnoses     Uncomplicated opioid dependence (H)          Care Instructions    Continue your Buprenorphine 4 mg  films/tabs  3 times daily     Follow up 1 month    A prescription has been sent to your pharmacy of choice.  If a prior authorization is required it may take several days to get your medication.  Please make sure your pharmacy had your contact information so they can contact you when it is ready to     You are at risk for overdose  ( including risk of death)  with return to use of opioids after a period of abstinence because your tolerance will have decreased dramatically.      It is strongly recommended that you abstain from alcohol, benzodiazepines (Xanax Valium, Klonipin) , THC, opioids and other drugs of abuse.  Use of these substances increases your risk of relapse for opioids.  Using these substances with Buprenorphine also incresaes your risk of overdose/death (especially alcohol/benzodiazepines).     You are encouraged to have some type of recovery program in addition to medication treatment.  Medication alone is generally not enough to lead to long term recovery.  This may include having some type of sober network, avoiding isolating, avoiding triggers (people, places, things you associate with using opioids).   Such supports may include Alcoholics Anonymous, Narcotics anonymous or other self help organizations as well as counseling.  We can help provide resources to these services.      Narcan kit prescriptions are available if you do not have one.       The addiction medicine clinic number is [phone].    If you cannot make your appointment please call the office and reschedule  "immediately. If you are out of medication a bridge can be sent to your pharmacy to last until the date of your rescheduled appointment. Our clinic is open from Monday-Friday 0800-4:30pm and there is not an ON CALL after hours service.  If medical care is needed after hours or on the weekend you will need to contact your primary care physician or go to an Urgent Care or ER.     [employer] messages and telephone calls from patients are taken care of by the nursing team within 24 business hours if received between Monday 8am - Friday 4:30pm. Therefore if a refill/bridge is needed it is important to call in advance so you do not run out of medications.     [facility] does not accept [employer] or [employer] Medical assistance insurance.                      Follow-ups after your visit        Who to contact     If you have questions or need follow up information about today's clinic visit or your schedule please contact [facility] directly at [phone].  Normal or non-critical lab and imaging results will be communicated to you by [employer], letter or phone within 4 business days after the clinic has received the results. If you do not hear from us within 7 days, please contact the clinic through [employer] or phone. If you have a critical or abnormal lab result, we will notify you by phone as soon as possible.  Submit refill requests through [employer] or call your pharmacy and they will forward the refill request to us. Please allow 3 business days for your refill to be completed.          Additional Information About Your Visit        [employer] Information     [employer] lets you send messages to your doctor, view your test results, renew your prescriptions, schedule appointments and more. To sign up, go to [email or web address] . Click on \"Log in\" on the left side of the screen, which will take you to the Welcome page. Then click on \"Sign up Now\" on the right side of the page. "     You will be asked to enter the access code listed below, as well as some personal information. Please follow the directions to create your username and password.     Your access code is: [email or web address]  Expires: 2018  1:39 PM     Your access code will  in 90 days. If you need help or a new code, please call your [place] clinic or [phone].        Care EveryWhere ID     This is your Care EveryWhere ID. This could be used by other organizations to access your [place] medical records  [id number]        Your Vitals Were     Pulse Temperature Respirations Pulse Oximetry BMI (Body Mass Index)       72 98.8  F (37.1  C) (Oral) 14 99% 25.23 kg/m2        Blood Pressure from Last 3 Encounters:   05/15/18 98/58   18 98/64   18 94/60    Weight from Last 3 Encounters:   05/15/18 147 lb (66.7 kg)   18 141 lb (64 kg)   18 144 lb 8 oz (65.5 kg)              We Performed the Following     Urine Drugs of Abuse Screen Panel 13          Where to get your medicines      Some of these will need a paper prescription and others can be bought over the counter.  Ask your nurse if you have questions.     Bring a paper prescription for each of these medications     buprenorphine HCl-naloxone HCl 4-1 MG per film          Primary Care Provider Office Phone # Fax #    Natty Barone [phone]       [facility] [address] [place] [address]        Equal Access to Services     LOUISE MARQUEZ AH: Hadii gaurav ku hadasho Soomaali, waaxda luqadaha, qaybta kaalmada aditya, waxernst ligia fernández . So [facility] [phone].    ATENCIÓN: Si habla español, tiene a polo disposición servicios gratuitos de asistencia lingüística. Llame al [phone].    We comply with applicable federal civil rights laws and [place] laws. We do not discriminate on the basis of race, color, national origin, age, disability, sex, sexual orientation, or gender identity.            Thank  you!     Thank you for choosing [facility]  for your care. Our goal is always to provide you with excellent care. Hearing back from our patients is one way we can continue to improve our services. Please take a few minutes to complete the written survey that you may receive in the mail after your visit with us. Thank you!             Your Updated Medication List - Protect others around you: Learn how to safely use, store and throw away your medicines at www.disposemymeds.org.          This list is accurate as of 5/15/18 11:27 AM.  Always use your most recent med list.                   Brand Name Dispense Instructions for use Diagnosis    buprenorphine HCl-naloxone HCl 4-1 MG per film    SUBOXONE    84 Film    Place 1 Film under the tongue 3 times daily    Uncomplicated opioid dependence (H)       DULoxetine 60 MG EC capsule    CYMBALTA    30 capsule    Take 1 capsule (60 mg) by mouth daily    Depression with suicidal ideation       escitalopram 20 MG tablet    LEXAPRO    30 tablet    Take 1 tablet (20 mg) by mouth daily    Sedative, hypnotic or anxiolytic dependence (H)       fluticasone 220 MCG/ACT Inhaler    FLOVENT HFA     Inhale 1 puff into the lungs 2 times daily        gabapentin 300 MG capsule    NEURONTIN    90 capsule    Take 1 capsule (300 mg) by mouth 3 times daily    Sedative, hypnotic or anxiolytic dependence (H)       hydrOXYzine 25 MG tablet    ATARAX    120 tablet    Take 1-2 tablets (25-50 mg) by mouth every 4 hours as needed for anxiety    Sedative, hypnotic or anxiolytic dependence (H)       METHYLPHENIDATE HCL PO      Take 5 mg by mouth 2 times daily        methylPREDNISolone 4 MG tablet    MEDROL DOSEPAK    21 tablet    Follow package instructions    Swelling of multiple joints       multivitamin, therapeutic with minerals Tabs tablet     30 each    Take 1 tablet by mouth daily    Sedative, hypnotic or anxiolytic dependence (H)       polyethylene glycol powder     MIRALAX    510 g    Take 17 g (1 capful) by mouth daily    Slow transit constipation       * QUETIAPINE FUMARATE PO      Take 50 mg by mouth 3 times daily        * QUEtiapine 25 MG tablet    SEROquel    90 tablet    Take 2 tablets (50 mg) by mouth 3 times daily    Depression with suicidal ideation       traZODone 50 MG tablet    DESYREL    90 tablet    Take 1-3 tablets ( mg) by mouth At Bedtime    Sedative, hypnotic or anxiolytic dependence (H)       tretinoin 0.01 % topical gel    RETIN-A     Apply topically At Bedtime        VENTOLIN  (90 Base) MCG/ACT Inhaler   Generic drug:  albuterol      Inhale 1 puff into the lungs every 4 hours as needed for shortness of breath / dyspnea or wheezing        * Notice:  This list has 2 medication(s) that are the same as other medications prescribed for you. Read the directions carefully, and ask your doctor or other care provider to review them with you.

## 2018-05-15 NOTE — PROGRESS NOTES
SUBJECTIVE:   Mae Holder is a 47 year old female who presents to clinic today for the following health issues:          ADDICTION MEDICINE NOTE:    PREVIOUS HISTORY AS BELOW    HAS ANOTHER FEW WEEKS OF PROGRESS SHER THEN WILL TRY TO GET INTO A SOBER HOUSE    DOESN'T FEEL STRONG ENOUGH TO LIVE WITHOUT SUPPORT, STRUCTURE    DOES NOT LIKE PROGRESS [place]    STAYING SOBER - NOW 5 MONTHS    DISCUSSED RECOVERY    CONTINUE SAME SUBOXONE; MAYBE DECREASE SOON    RE-CHECK 1 MONTH          [place] : NO ISSUES; CHECKED 5/15/18; methylphenidate noted - review next visit          PREVIOUS HISTORY :    INITIAL VISIT    LODGING PLUS PATIENT    HERE TO DISCUSS MAT OPTIONS    SEVERAL YR HISTORY OF ADDICTION  ALCOHOL, OPIOIDS DRUGS OF CHOICE    TREATMENT AT [facility] 1 YR AGO  HISTORY OF SUCCESS WITH VIVITROL FOR ALCOHOL DEPENDENCE A FEW YRS AGO; TOOK IT FOR A YEAR    RECENT DETOX, THEN WENT TO THE RETREAT  DEVELOPED DEPRESSION THERE AND WAS HOSPITALIZED AT [facility] FOR A SHORT TIME  NOW IN [facility]    HAVING INTENSE CRAVING FOR ALCOHOL AND OPIOIDS  HISTORY OXYCODONE USE, HEROIN ONLY 1 TIME BUT LOVED IT    DISCUSSED PROS AND CONS OF SUBOXONE VS. VIVITROL  CONSIDERING GOING BACK TO THE RETREAT AFTER [facility] BUT CAN'T IF SHE IS ON SUBOXONE  REALIZES SUBOXONE RELIEVES CRAVING BETTER THAN NALTREXONE    ELECTS TO PROCEED WITH SUBOXONE     Problem list and histories reviewed & adjusted, as indicated.  Additional history: as documented    Patient Active Problem List   Diagnosis     Sedative and hypnotic drug poisoning     Depression with suicidal ideation     Chemical dependency (H)     Uncomplicated opioid dependence (H)     Past Surgical History:   Procedure Laterality Date     BUNIONECTOMY  2004, 2010     HYSTERECTOMY  07/2017    Partial       Social History   Substance Use Topics     Smoking status: Current Every Day Smoker     Packs/day: 1.00     Years: 6.00     Types: Cigarettes     Smokeless tobacco: Never Used      Alcohol use Yes     Family History   Problem Relation Age of Onset     Lung Cancer Paternal Grandfather          Current Outpatient Prescriptions   Medication Sig Dispense Refill     buprenorphine HCl-naloxone HCl (SUBOXONE) 4-1 MG per film Place 1 Film under the tongue 3 times daily 84 Film 0     albuterol (VENTOLIN HFA) 108 (90 BASE) MCG/ACT Inhaler Inhale 1 puff into the lungs every 4 hours as needed for shortness of breath / dyspnea or wheezing       DULoxetine (CYMBALTA) 60 MG EC capsule Take 1 capsule (60 mg) by mouth daily 30 capsule 0     escitalopram (LEXAPRO) 20 MG tablet Take 1 tablet (20 mg) by mouth daily 30 tablet 0     fluticasone (FLOVENT HFA) 220 MCG/ACT Inhaler Inhale 1 puff into the lungs 2 times daily       gabapentin (NEURONTIN) 300 MG capsule Take 1 capsule (300 mg) by mouth 3 times daily 90 capsule 0     hydrOXYzine (ATARAX) 25 MG tablet Take 1-2 tablets (25-50 mg) by mouth every 4 hours as needed for anxiety 120 tablet 0     METHYLPHENIDATE HCL PO Take 5 mg by mouth 2 times daily       methylPREDNISolone (MEDROL DOSEPAK) 4 MG tablet Follow package instructions 21 tablet 0     multivitamin, therapeutic with minerals (THERA-VIT-M) TABS tablet Take 1 tablet by mouth daily 30 each 0     polyethylene glycol (MIRALAX) powder Take 17 g (1 capful) by mouth daily 510 g 1     QUEtiapine (SEROQUEL) 25 MG tablet Take 2 tablets (50 mg) by mouth 3 times daily 90 tablet 1     QUETIAPINE FUMARATE PO Take 50 mg by mouth 3 times daily       traZODone (DESYREL) 50 MG tablet Take 1-3 tablets ( mg) by mouth At Bedtime 90 tablet 0     tretinoin (RETIN-A) 0.01 % topical gel Apply topically At Bedtime       No Known Allergies  Labs reviewed in EPIC      Reviewed and updated as needed this visit by clinical staffTobacco       Reviewed and updated as needed this visit by Provider  Nai         ROS:      OBJECTIVE:     BP 98/58  Pulse 72  Temp 98.8  F (37.1  C) (Oral)  Resp 14  Wt 147 lb (66.7 kg)   SpO2 99%  BMI 25.23 kg/m2  Body mass index is 25.23 kg/(m^2).     ROS:  Constitutional, HEENT, cardiovascular, pulmonary, gi and gu systems are negative, except as otherwise noted.    BP 98/58  Pulse 72  Temp 98.8  F (37.1  C) (Oral)  Resp 14  Wt 147 lb (66.7 kg)  SpO2 99%  BMI 25.23 kg/m2  EXAM:  GENERAL APPEARANCE: healthy, alert and no distress  EYES: Eyes grossly normal to inspection, PERRL and conjunctivae and sclerae normal  NEURO: Normal strength and tone, mentation intact and speech normal  PSYCH: mentation appears normal and affect normal/bright  MENTAL STATUS EXAM:  Appearance/Behavior: No apparent distress and Casually groomed  Speech: Normal  Mood/Affect: normal affect  Insight: Adequate          ASSESSMENT:   OPIOID DEPENDENCE   ALCOHOL DEPENDENCE     PLAN:       ICD-10-CM    1. Uncomplicated opioid dependence (H) F11.20 Urine Drugs of Abuse Screen Panel 13     buprenorphine HCl-naloxone HCl (SUBOXONE) 4-1 MG per film           MEDICATIONS:   Orders Placed This Encounter   Medications     buprenorphine HCl-naloxone HCl (SUBOXONE) 4-1 MG per film     Sig: Place 1 Film under the tongue 3 times daily     Dispense:  84 Film     Refill:  0          - Continue other medications without change  FUTURE APPOINTMENTS:       - Follow-up visit in 4 WEEKS    Mehdi Ross MD  [facility]

## 2018-06-03 ENCOUNTER — NURSE TRIAGE (OUTPATIENT)
Dept: NURSING | Facility: CLINIC | Age: 48
End: 2018-06-03

## 2018-06-03 NOTE — TELEPHONE ENCOUNTER
"\"I need help, I have been drinking heavily for days.  I am having tremors and seeing bright flashing lights.  I went to the ER up here in [place] but they could not help me.  They gave me Ativan and sent me on my way.  I even called 911 on myself, they can't help.\"      Dispo: Call 911, pt declines, states she has already done that and it doesn't help.     Writer called [facility] intake at [phone], no beds available for detox.  Pt prefers to go to [facility] but also knows she needs help now.  Pt states she does not want to go to [facility].  Writer gave her the phone number to [facility] intake, [facility] [phone] and [facility] [phone].       Writer called [facility] but they do not take pt's from [facility].      [facility] called [phone], states they will not take a pt from [facility] unless they are full.      [facility]. contacted, states they are not taking female pts right now as they are under construction.      [facility] states they need a face sheet stating [place] [address] detox is full, requesting admission to [facility].  Pt will need to be sent from an ED.      Writer tried calling pt back at [phone], left a general voicemail message, will try calling back.      Writer reached pt, she stated she called [facility] again and they don't have a bed available.  Advised pt that she should seek care in the ED now (as instructed in triage).  Explained that pt will need an order with instructions stating, \"Admission to [facility], [address] [place] [address] is not accepting female pts due to construction.\"  Pt stated her understanding and had no further questions.  Unsure what pt will do.          Reason for Disposition    Seeing, hearing, or feeling things that are not there (i.e., visual, auditory, or tactile hallucinations)    Additional Information    Negative: Coma (e.g., not moving, not talking, not responding to stimuli)    " Negative: Difficult to awaken or acting confused  (e.g., disoriented, slurred speech)    Protocols used: ALCOHOL ABUSE AND DEPENDENCE-ADULT-AH

## 2018-06-08 ENCOUNTER — NURSE TRIAGE (OUTPATIENT)
Dept: NURSING | Facility: CLINIC | Age: 48
End: 2018-06-08

## 2018-06-08 NOTE — TELEPHONE ENCOUNTER
Mae is looking to get into detox and treatment.  FNA relayed Dr. Ross @ [facility] and the intake line.  Mae is from [facility].

## 2018-08-03 ENCOUNTER — RECORDS - HEALTHEAST (OUTPATIENT)
Dept: ADMINISTRATIVE | Facility: OTHER | Age: 48
End: 2018-08-03

## 2018-11-01 NOTE — PROGRESS NOTES
"[facility] Progress Note      Interval History:   Pt seen.  The preceding clinical notes were reviewed and appreciated.  Patient reports she is doing much better on her currently prescribed medications.  She states she receives support from her former boss who visited her yesterday.  She also got a new sponsor who she reached out to through some of her contacts and sober community.  She states she would like to return to the retreat because she feels she connected with them there.  She states she got a call from the other ladies at the retreat\" it upon themselves to reach out to her.  She claims she feels comfortable on current medications and hasn't experienced any side effects.  She states she has not had any crying jags since last Thursday.  She feels she is ready to focus on her chemical health [facility]  The undersigned did speak with her providers at the retreat with her permission and asked to know about her current status.  They were apprised that the patient had been through medication changes and is currently stable enough to return to their facility but they recommended that the patient posed to residential treatment specifically at [facility] before transitioning back to [facility].  Patient was apprised of this and she broke down crying. She did indicate that her insurance carrier has changed to [employer] and she wants to complete CD assessment and pursue treatment at [facility]. Denies suicidal or homicidal ideation. Tolerating medications well without significant side effects.     Review of systems:   The Review of Systems is negative other than noted in the HPI     Medications:       DULoxetine  30 mg Oral Daily     escitalopram  20 mg Oral Daily     gabapentin  300 mg Oral TID     multivitamin, therapeutic with minerals  1 tablet Oral Daily     traZODone   mg Oral At Bedtime     nicotine   Transdermal Q8H     nicotine   Transdermal Daily     " [facility]  Post-operative instructions  For: Ananya Robert    Your first postop appointment should be scheduled with Dr. Joy Lainez for 3 weeks post-op. [address], [address]  [facility], [place]  Phone: [phone]  Hand Therapy Phone: [phone]  Fax: [phone]    Please follow these instructions for a safe and speedy recovery:    1. Surgical Bandage: Leave the bandage in place until 2 weeks after surgery. Please keep it clean and dry. To shower or bathe, apply a plastic bag or GLAD Press'n Seal® plastic wrap around the bandage or simply sponge bathe. After 2 weeks, you can remove the dressing and get incision wet but NO SOAKING. 2. Elevation: Hand swelling is best prevented by keeping your hand elevated above the level of your heart at all times, night and day. The opposite, dangling your hand below your waist, will cause additional pain, swelling, and later stiffness. You can elevate the hand in a sling or by propping it on a pillow at night. Occasionally, we will provide you with a custom-made foam block for elevating the arm. Ice compresses may help but do not rep[lace elevation. Frequently, extreme pain is caused by a tight bandage, which should be loosened. If pain is severe and progressive, call us at [phone] during the day (ask for immediate connection to Dr. Chante Brewer Team) or during the night (ask for the on-call physician). 3. Medication: You will be provided with an appropriate pain medication (over-the-counter or prescription). Please fill this at a pharmacy promptly so you will have it available when all local anesthetic wears off. Take this to relieve pain as directed on the bottle. Please refrain from driving, drinking alcohol, and making important medical decisions while taking the medication. Please call us if you need something stronger.  Medication changes or nicotine  1 patch Transdermal Daily     QUEtiapine, hydrOXYzine, acetaminophen    Mental Status Examination:     Appearance:  awake, alert, adequately groomed and casually dressed  Eye Contact:  good  Speech:  Clear and coherent.   Psychomotor Behavior:  no evidence of tardive dyskinesia, dystonia, or tics and fidgeting  Mood: Good  Affect:  No longer labile, now stable, more euthymic with slight blunting  Thought Process:  logical, linear and goal oriented no loose associations  Thought Content:  no evidence of suicidal ideation or homicidal ideation.  She denies self-harm thoughts plans or intent   Oriented to:  time, person, and place  Attention Span and Concentration:  good  Recent and Remote Memory:  good  Fund of Knowledge: appropriate  Muscle Strength and Tone: normal based on visual inspection   Gait and Station: Normal  Insight:  Building   Judgment:  Improving           Labs/Vitals:     Recent Results (from the past 24 hour(s))   EKG 12-lead, tracing only    Collection Time: 01/01/18  6:10 PM   Result Value Ref Range    Interpretation ECG Click View Image link to view waveform and result      B/P: 108/58, T: 97.7, P: 51, R: 16    Impression:   This is a 47-year-old woman with established history of polysubstance abuse who recently underwent chemical detox at [facility] from where she was discharged to [facility] in [place].  She had presented with features of depression and was admitted to the hospital for stabilization.      1/1/18  Feels much better overall. Reports mood is improving. Denies any crying spells over the weekend. She hopes to be accepted back to treatment. Tolerating medications well. She is relying on PRN Seroquel though since admission .  1/2/18  Patient doing well but she could not return to [facility] their management there feels that she needs residential treatment before she comes back to them      DIagnoses:     1.  Major depressive disorder,  refills must be made before 5pm or through your pharmacy. 4. Weight bearing: Do NOT bear any weight on the operative extremity for the first 2 weeks after surgery. After 2 weeks, you have a 5 pound weight lifting restriction. I want to thank you for choosing us for your hand care needs. My staff and I are committed to providing all our customers with the highest quality hand surgery and subsequent hand therapy care as possible. We want your recovery to be comfortable. If you have clinical non-emergent questions about your surgery or other hand conditions please call [phone] and ask for my team. Your call will be returned within 24 hours. DO NOT TAKE TYLENOL/ACETAMINOPHEN WITH PERCOCET, LORTAB, [address] TAKE NARCOTIC PAIN MEDICATIONS WITH FOOD     If given 2 pain narcotics do NOT take together! Narcotics tend to be constipating, we suggest taking a stool softener such as Colace or Miralax (follow package instructions). DO NOT DRIVE WHILE TAKING NARCOTIC PAIN MEDICATIONS. DO NOT TAKE SLEEPING MEDICATIONS OR ANTIANXIETY MEDICATIONS WHILE TAKING NARCOTIC PAIN MEDICATIONS,  ESPECIALLY THE NIGHT OF ANESTHESIA! CPAP PATIENTS BE SURE TO WEAR MACHINE WHENEVER NAPPING OR SLEEPING! DISCHARGE SUMMARY from Nurse    The following personal items collected during your admission are returned to you:   Dental Appliance: Dental Appliances: None  Vision: Visual Aid: Glasses, Sent home  Hearing Aid:    Jewelry: Jewelry: None  Clothing: Clothing: With patient  Other Valuables: Other Valuables: None  Valuables sent to safe:        PATIENT INSTRUCTIONS:    After General Anesthesia or Intravenous Sedation, for 24 hours or while taking prescription Narcotics:        Someone should be with you for the next 24 hours. For your own safety, a responsible adult must drive you home. · Limit your activities  · Recommended activity: Rest today, up with assistance today.  Do not climb stairs or shower recurrent, severe, without psychotic features.   2.  Alcohol use disorder.   3.  Sedative/hypnotic use disorder.    4.  Opioid use disorder.   5.  Mild intermittent asthma.            Plan:   1. Written information given on medications. Side effects, risks, benefits reviewed. Discussed side effect risk of serotonin sydrome and cardiac arrythmias, including risk of death. Patient feels benefits outweigh risks based on current response to medications.   2.  Continue individual, milieu and group therapy.  Complete CD assessment.  Increased Cymbalta to 60 mg daily.  3.  Continue hospitalization until transferred to residential CD treatment.      Attestation:  Patient has been seen and evaluated by Lottie joel MD    PATIENT ID  Name: Mae Holder  MRN:[id number]  YOB: 1970   unattended for the next 24 hours. · Please start with a soft bland diet and advance as tolerated (no nausea) to regular diet. · If you have a sore throat you should try the following: fluids, warm salt water gargles, or throat lozenges. If it does not improve after several days please follow up with your primary physician. · Do not drive and operate hazardous machinery  · Do not make important personal or business decisions  · Do  not drink alcoholic beverages  · If you have not urinated within 8 hours after discharge, please contact your surgeon on call. Report the following to your surgeon:  · Excessive pain, swelling, redness or odor of or around the surgical area  · Temperature over 100.5  · Nausea and vomiting lasting longer than 4 hours or if unable to take medications  · Any signs of decreased circulation or nerve impairment to extremity: change in color, persistent  numbness, tingling, coldness or increase pain      · You will receive a Post Operative Call from one of the Recovery Room Nurses on the day after your surgery to check on you. It is very important for us to know how you are recovering after your surgery. If you have an issue or need to speak with someone, please call your surgeon, do not wait for the post operative call. · You may receive an e-mail or letter in the mail from [facility] regarding your experience with us in the Ambulatory Surgery Unit. Your feedback is valuable to us and we appreciate your participation in the survey. · If the above instructions are not adequate or you are having problems after your surgery, call the physician at their office number. · We wish you a speedy recovery ? What to do at Home:      *  Please give a list of your current medications to your Primary Care Provider. *  Please update this list whenever your medications are discontinued, doses are      changed, or new medications (including over-the-counter products) are added.     * Please carry medication information at all times in case of emergency situations. These are general instructions for a healthy lifestyle:    No smoking/ No tobacco products/ Avoid exposure to second hand smoke    Surgeon General's Warning:  Quitting smoking now greatly reduces serious risk to your health. Obesity, smoking, and sedentary lifestyle greatly increases your risk for illness    A healthy diet, regular physical exercise & weight monitoring are important for maintaining a healthy lifestyle    You may be retaining fluid if you have a history of heart failure or if you experience any of the following symptoms:  Weight gain of 3 pounds or more overnight or 5 pounds in a week, increased swelling in our hands or feet or shortness of breath while lying flat in bed. Please call your doctor as soon as you notice any of these symptoms; do not wait until your next office visit. Recognize signs and symptoms of STROKE:    B - Balance  E - Eyes    F-  Face looks uneven  A-  Arms unable to move or move even  S-  Speech slurred or non-existent  T-  Time-call 911 as soon as signs and symptoms begin-DO NOT go       Back to bed or wait to see if you get better-TIME IS BRAIN. If you have not received your influenza and/or pneumococcal vaccine, please follow up with your primary care physician. The discharge information has been reviewed with the patient and caregiver. The patient and caregiver verbalized understanding.

## 2021-05-30 VITALS — HEIGHT: 64 IN | BODY MASS INDEX: 23.05 KG/M2 | WEIGHT: 135 LBS

## 2021-05-31 VITALS — WEIGHT: 133 LBS | BODY MASS INDEX: 23.01 KG/M2

## 2021-05-31 VITALS — WEIGHT: 132 LBS | HEIGHT: 64 IN | BODY MASS INDEX: 22.53 KG/M2

## 2021-05-31 VITALS — HEIGHT: 64 IN | WEIGHT: 132 LBS | BODY MASS INDEX: 22.53 KG/M2

## 2021-05-31 VITALS — WEIGHT: 141 LBS | BODY MASS INDEX: 24.2 KG/M2

## 2021-06-01 VITALS — WEIGHT: 142 LBS | BODY MASS INDEX: 24.37 KG/M2

## 2021-06-01 VITALS — BODY MASS INDEX: 23.86 KG/M2 | WEIGHT: 139 LBS

## 2021-06-10 NOTE — PROGRESS NOTES
"    Assessment and Plan    1. Cystocele  - Ambulatory referral to Obstetrics / Gynecology    2. Alcohol dependence in remission    Medications Ordered   Medications     naltrexone (DEPADE) 50 mg tablet     Sig: Take 1 tablet (50 mg total) by mouth daily.     Dispense:  30 tablet     Refill:  5     3. Major depressive disorder  I am happy to refill her Effexor in the future if she runs out     4. ADHD - predominantly inattentive.  Cannot use her usual Vyvanse while she is in a sober house.  Also,she  cannot use Strattera as it is too close to Effexor (both are SNRIs).  However methylphenidate is NOT an amphetamine    Patient Instructions   Ask your house about using methylphenidate (\"Ritalin\")    Over 40 minutes spent with this patient, over half in counseling        Natty Barone MD     -------------------------------------------    Chief Complaint   Patient presents with     [facility]     moved from [place], no records.  Use to see Dr. Mendoza.     Cyst     on labia for couple of months, no pain at all.  Change in size.       Mae comes to [facility], hopes to get prescription for help with alcohol dependence in remission, and is worried about a mass in her vulvar area    Flora notes 18 months sobriety  when she moved to [place] to be back with her boyfriend. Unfortunately she then relapsed and parents helped her come back to [place] and she just completed treatment with [facility]  March 13- April 14th. She ws on the vivitrol (naltrexone) shot previously and she left with a short-term prescription for naltrexone.  She has to do a rule 25 waver so that Medical Assistance will cover chemical dependency - [facility]  does not.    She also has diagnoses of anxiety and depression and takes Effexor for this  Little interest or pleasure in doing things: Several days  Feeling down, depressed, or hopeless: More than half the days  Trouble falling or staying asleep, or sleeping too much: Not at all  Feeling tired or " "having little energy: Several days  Poor appetite or overeating: Several days  Feeling bad about yourself - or that you are a failure or have let yourself or your family down: More than half the days  Trouble concentrating on things, such as reading the newspaper or watching television: More than half the days  Moving or speaking so slowly that other people could have noticed. Or the opposite - being so fidgety or restless that you have been moving around a lot more than usual: Not at all  Thoughts that you would be better off dead, or of hurting yourself in some way: Not at all  PHQ-9 Total Score: 9  If you checked off any problems, how difficult have these problems made it for you to do your work, take care of things at home, or get along with other people?: Somewhat difficult      She also has a diagnosis of ADHD and has taken Vyvanse in the past. She was tested for ADHD when she was 6 months sober - at [facility]. She has never abused amphetamines - she says this type of medication does not in any way make her feel good, and she only takes it so that she can function.  However she cannot by on Vyvanse because of rules at sober [facility].  She says \"I'm all over the place \" with organization off the Vyvanse.  Her ADHD  affects her ability to have a better life - takes her a long time to get out the door.  She wonders about other medication for ADHD    She is concerned about a \"cyst\" coming through her vagina - noticed about 3 months ago - \":some days it is really there and it hangs out.\" She has no children and has not had a pap smear in 2-3  years.   Every time she has a pap smear she is told she had a tilted uterus.  She has been recently tested for sexually transmitted infections    Social: living in a sober house  She also tells a martine story of giving her car to a poor family she met in [place], as she had to leave quickly, and could not drive in any case    I reviewed the following portions of the patient's " history: allergies, current medications, past family history, past medical history, past social history, past surgical history and problem list.    Patient Active Problem List   Diagnosis     Alcohol dependence in remission     MDD (major depressive disorder), recurrent episode, moderate     ADHD (attention deficit hyperactivity disorder)     Benzodiazepine abuse in remission     Anxiety state, unspecified     Tobacco abuse         Current Outpatient Prescriptions:      albuterol (ACCUNEB) 0.63 mg/3 mL nebulizer solution, Take 1 ampule by nebulization every 6 (six) hours as needed for wheezing., Disp: , Rfl:      venlafaxine (EFFEXOR) 75 MG tablet, Take 225 mg by mouth daily., Disp: , Rfl:      FLOVENT  mcg/actuation inhaler, Inhale 2 puffs as needed. , Disp: , Rfl:      lisdexamfetamine (VYVANSE) 20 MG capsule, Take 1 capsule (20 mg total) by mouth every morning., Disp: 30 capsule, Rfl: 0     naltrexone (DEPADE) 50 mg tablet, Take 1 tablet (50 mg total) by mouth daily., Disp: 30 tablet, Rfl: 5     traZODone (DESYREL) 150 MG tablet, Take 1 tablet (150 mg total) by mouth bedtime., Disp: 30 tablet, Rfl: 11    Current Facility-Administered Medications:      naltrexone microspheres injection 380 mg (VIVITROL), 380 mg, Intramuscular, Once, Slime Carter MD      Health Maintenance Due   Topic Date Due     DEPRESSION FOLLOW UP  1970     ADVANCE DIRECTIVES DISCUSSED WITH PATIENT  05/28/1990         History   Smoking Status     Current Every Day Smoker     Packs/day: 1.00     Years: 3.00     Types: Cigarettes     Start date: 5/29/2010   Smokeless Tobacco     Not on file     Comment: Wants to quit down the road       History   Alcohol Use No       Review of Systems -feeling physically well    Vitals:    04/27/17 1621   BP: (!) 86/54   Pulse: 89   SpO2: 98%     Body mass index is 23.35 kg/(m^2).   Note: says BP tends to run low  EXAM:    General appearance - alert, well appearing, and in no  distress  Mental status - normal mood, behavior, speech, dress, motor activity, and thought processes  Pelvic - normal external genitalia, vulva, vagina, cervix, uterus and adnexa; large cystocele present

## 2021-06-11 NOTE — ANESTHESIA PREPROCEDURE EVALUATION
Anesthesia Evaluation      Patient summary reviewed   History of anesthetic complications     Airway   Mallampati: I  Neck ROM: full   Pulmonary - normal exam   (+) asthma   well controlled, a smoker                         Cardiovascular - negative ROS and normal exam  Exercise tolerance: > or = 4 METS   Neuro/Psych    (+) depression, anxiety/panic attacks,     Endo/Other - negative ROS      GI/Hepatic/Renal - negative ROS      Other findings: States had nausea after bunion surgery, no details available. ADHD.  K 4.5, Cr 0.80, Hg 13.6.  H/o EtOH and benzo abuse, in sober house.  BP runs low, was 88/62 at physical.        Dental - normal exam                        Anesthesia Plan  Planned anesthetic: general endotracheal  Scop patch, background propofol infusion.  ASA 2   Induction: intravenous   Anesthetic plan and risks discussed with: patient  Anesthesia plan special considerations: antiemetics,   Post-op plan: routine recovery

## 2021-06-12 NOTE — ANESTHESIA CARE TRANSFER NOTE
Last vitals:   Vitals:    07/20/17 1249   BP: 119/66   Pulse: 83   Resp: 12   Temp: 36.3  C (97.4  F)   SpO2: 100%     Patient's level of consciousness is drowsy  Spontaneous respirations: yes  Maintains airway independently: yes  Dentition unchanged: yes  Oropharynx: oropharynx clear of all foreign objects    QCDR Measures:  ASA# 20 - Surgical Safety Checklist: ASA20A - Safety Checks Done  PQRS# 430 - Adult PONV Prevention: 4558F - Pt received => 2 anti-emetic agents (different classes) preop & intraop  ASA# 8 - Peds PONV Prevention: NA - Not pediatric patient, not GA or 2 or more risk factors NOT present  PQRS# 424 - Tova-op Temp Management: 4559F - At least one body temp DOCUMENTED => 35.5C or 95.9F within required timeframe  PQRS# 426 - PACU Transfer Protocol: - Transfer of care checklist used  ASA# 14 - Acute Post-op Pain: ASA14B - Patient did NOT experience pain >= 7 out of 10

## 2021-06-12 NOTE — ANESTHESIA POSTPROCEDURE EVALUATION
Patient: Mae Patelell  HYSTERECTOMY, SUPRACERVICAL, ROBOT-ASSISTED, LAPAROSCOPIC, WITH SACROCOLPOPEXY WITH BILATERAL SALPINGECTOMY CYSTOSCOPY REMOVAL OF IUD  Anesthesia type: general    Patient location: PACU  Last vitals:   Vitals:    07/20/17 1330   BP: 97/56   Pulse: (!) 50   Resp: 12   Temp:    SpO2: 100%     Post vital signs: stable  Level of consciousness: alert and conversant  Post-anesthesia pain: pain controlled  Post-anesthesia nausea and vomiting: no  Pulmonary: supplemental oxygen when seen  Cardiovascular: stable and blood pressure at baseline  Hydration: adequate  Anesthetic events: no    QCDR Measures:  ASA# 11 - Tova-op Cardiac Arrest: ASA11B - Patient did NOT experience unanticipated cardiac arrest  ASA# 12 - Tova-op Mortality Rate: ASA12B - Patient did NOT die  ASA# 13 - PACU Re-Intubation Rate: ASA13B - Patient did NOT require a new airway mgmt  ASA# 10 - Composite Anes Safety: ASA10A - No serious adverse event  ASA# 38 - New Corneal Injury: ASA38A - No new exposure keratitis or corneal abrasion in PACU    Additional Notes:

## 2021-06-15 NOTE — PROGRESS NOTES
"Assessment and Plan    1. Major depression in partial remission/ 2. Alcohol dependence in remission  Currently at [facility] through [place]- her next placement is up in the air, as there is a 5 week waiting list at [facility] in [facility], where she intended to continue her treatment.  If she is discharged, I could refill her antidepressant until she is established with out patient psychiatry (not yet necessary)    3. Acne  - tretinoin (RETIN-A) 0.01 % gel; Apply topically daily. Apply this layer qhs, 30 minutes after washing and patting dry face.  Avoid eyes  Dispense: 45 g; Refill: 1    4. Attention deficit hyperactivity disorder (ADHD), predominantly inattentive type  - methylphenidate HCl (RITALIN) 5 MG tablet; Take 1 tablet (5 mg total) by mouth 2 (two) times a day.  Dispense: 60 tablet; Refill: 0    Over 25 minutes spent with this patient, over half in counseling - reviewing history and next steps    follow up 2 weeks      Natty Barone MD     -------------------------------------------    Chief Complaint   Patient presents with     Hospital Visit Follow Up     WANTS TO GO ON ADDERALL     I have not seen Gauri since 9/17. She broke up with her boyfriend in early November and had called care connection feeling very depressed - urged to go to ER.  When she didn't show up I sent police out to her apartment - she later called and said she was ok - then I didn't hear back from her.    Gauri says that following the break-up, she drank for 3 weeks straight, then  decided to move back to [facility] with her parents. Even there she was \"drinking around the clock\" - called the ambulance on herself - was detox-ing for a week and a couple of days.  They let her go with 60 benzodiazepines, and she abused those.  She got back to [place] briefly to bring some items to her boss at [employer], then on the drive back to [facility] with a friend, she asked her friend driving to pull over to the nearest hospital because she " "could not stop thinking about killing herself. They kept her for the night in [place] - then she went to [facility] 1.5 weeks, then to [facility] a [facility] over [place]. Since  Staff at [facility] were not antonio to address depression,  they sent her to [facility] for a week where staff got her stabilized on medication (\"it was hell - I will never go to another behavioral health place again\") - Allenjocelineesmre called [facility]  said she was ready, but they refused her.  She was able to get into [facility] through [facility] - (outpatient with lodging: [email or web address] ) and is currently with this program.  She  feels a lot better mentally.  The plan after [facility] was to go to 90  Day program at [facility] in [facility] - but she has learned there is a 5-week waiting list.  She will be released from [facility] soon, and while staying in a sober house could be an option, she does not feel ready for that.     However if she is discharged from [facility] without further treatment, she will go back to work and needs a note for this    She is not having any alcohol cravings.  She does say , \"I need to have a psychologist and psychiatrist when I get out\"    Little interest or pleasure in doing things: More than half the days  Feeling down, depressed, or hopeless: More than half the days  Trouble falling or staying asleep, or sleeping too much: Not at all  Feeling tired or having little energy: Not at all  Poor appetite or overeating: Not at all  Feeling bad about yourself - or that you are a failure or have let yourself or your family down: Several days  Trouble concentrating on things, such as reading the newspaper or watching television: More than half the days  Moving or speaking so slowly that other people could have noticed. Or the opposite - being so fidgety or restless that you have been " moving around a lot more than usual: Not at all  Thoughts that you would be better off dead, or of hurting yourself in some way: Several days  PHQ-9 Total Score: 8  If you checked off any problems, how difficult have these problems made it for you to do your work, take care of things at home, or get along with other people?: Very difficult      She is taking:  -suboxone - would help with alcohol craving (?)  -lexapro 20 mg  -cymbalta - not sure of mg  -Seroquel for anxiety    Re ADHD: I had been prescribing methylphenidate for Flora and she has a current CSA. She had been on Vyvanse in the past  But it is not covered here.  She wonders if adderall would work better.  I said I would prefer to keep her on what we know works, as there may be other changes in her medication by psychiatry            ALSO:  She would like a refill of Retin A for acne    Patient Active Problem List   Diagnosis     Alcohol dependence in remission     MDD (major depressive disorder), recurrent episode, moderate     ADHD (attention deficit hyperactivity disorder)     Benzodiazepine abuse in remission     Anxiety state, unspecified     Tobacco abuse     Asthma     Cystocele, midline       Current Outpatient Prescriptions on File Prior to Visit   Medication Sig Dispense Refill     albuterol (ACCUNEB) 0.63 mg/3 mL nebulizer solution Take 3 mL (0.63 mg total) by nebulization every 6 (six) hours as needed for wheezing. 100 vial 1     fluticasone (FLOVENT HFA) 220 mcg/actuation inhaler Inhale 2 puffs as needed. 1 Inhaler 5     gabapentin (NEURONTIN) 300 MG capsule Take 1 capsule (300 mg total) by mouth 3 (three) times a day. 90 capsule 2     traZODone (DESYREL) 150 MG tablet TAKE 1 TABLET(150 MG) BY MOUTH AT BEDTIME 30 tablet 5     ibuprofen (ADVIL,MOTRIN) 400 MG tablet Take 1 tablet (400 mg total) by mouth every 6 (six) hours as needed. 30 tablet 0     Current Facility-Administered Medications on File Prior to Visit   Medication Dose Route  Frequency Provider Last Rate Last Dose     naltrexone microspheres injection 380 mg (VIVITROL)  380 mg Intramuscular Once Slime Carter MD             History   Smoking Status     Current Every Day Smoker     Packs/day: 1.00     Years: 3.00     Types: Cigarettes     Start date: 5/29/2010   Smokeless Tobacco     Never Used     Comment: Wants to quit down the road       History   Alcohol Use No     Comment: hx of ETOH abuse, in remission living in a sober house         Vitals:    01/25/18 1439   BP: (!) 86/52   Pulse: 64   SpO2: 97%     Body mass index is 24.39 kg/(m^2).     EXAM:    General appearance - alert, well appearing, and in no distress  Mental status - slightly depressed mood, constricted affect;normal behavior, speech, dress, motor activity, and thought processes  Skin - mild non-inflammatory acne on face (but it is covered with foundation)

## 2021-06-16 NOTE — PROGRESS NOTES
Assessment and Plan    1. MDD (major depressive disorder), recurrent episode, moderate/2. Anxiety state, unspecified  Worse than at last visit, possibly due to increased stressors: 1) lack of friends at facility where she is staying 2) boyfriend who dumped her has contacted her again and is giving mixed signal.  I did advise her absolutely to block his calls and ignore any other attempt on his part to contact her. She had also noted worsening concentration and wondered about increasing methylphenidate, however I decline for now, as depression can also cause concentration issues. I do think she needs to have a psychiatrist - has not been followed by a psychiatrist since her discharge from inpatient care for alcohol abuse  - Duloxetine (CYMBALTA) 60 MG capsule; Take 1 capsule (60 mg total) by mouth daily.  Dispense: 30 capsule; Refill: 0  - escitalopram oxalate (LEXAPRO) 20 MG tablet; Take 1 tablet (20 mg total) by mouth daily.  Dispense: 30 tablet; Refill: 0  - Ambulatory referral to Psychiatry      3. Arthralgia  New, worse in am; mother with lupus and rheumatoid arthritis  - ibuprofen (ADVIL,MOTRIN) 400 MG tablet; Take 1 tablet (400 mg total) by mouth every 6 (six) hours as needed.  Dispense: 30 tablet; Refill: 0  - Rheumatoid Factor Screen  - C-Reactive Protein(CRP)  - Vitamin D, Total (25-Hydroxy)  - Thyroid Stimulating Hormone (TSH)  - T4, Free  - Lyme Antibody [place]    4. Rash  - KOH Prep - negative, however I still think this could be fungal - will cover both fungal and bacterial  - ketoconazole (NIZORAL) 2 % cream; Apply BID to foot rash  Dispense: 15 g; Refill: 1  - bacitracin-polymyxin b (POLYSPORIN) ointment; Apply to affected area daily  Dispense: 30 g; Refill: 1  - menthol-zinc oxide (MENTHOL-ZINC OXIDE) 0.15-1 % Powd powder; Use daily as needed to keep feet dry  Dispense: 113 g; Refill: 0    5. Acne  - tretinoin (RETIN-A) 0.01 % gel; Apply topically daily. Apply this layer qhs, 30 minutes after  "washing and patting dry face.  Avoid eyes  Dispense: 45 g; Refill: 1    6. Encounter for special screening examination for neoplasm of breast    Patient Instructions   Because your depression is not yet ideally controlled, I would like to see you for a follow up visit in 4-6 weeks.    Regarding your foot rash - even though the test was negative for fungus, it still could be that so I will treat you presumptively.  I also want to cover you for a bacterial infection with antibacterial ointment.  Finally, please do wear absorbent socks and use powder to minimize moisture the the conditions that microbes like.      Mammogram ordered      Return in about 4 weeks (around 4/17/2018).    Natty Barone MD     -------------------------------------------    Chief Complaint   Patient presents with     Follow Up     discuss about medications     Rash     on bottom of right foot for couple of months, spreading, has not used anything on it.       Concerns     joint pain, achy, noticed it for the last couple of months, usually there, can hardly move in the morning.  Mother has RA and lupus.  Pt is nervouse that pt is starting to have this.       Since I last saw Mae almost 2 months ago, she has checked herself in to  [facility] in [place] - 90 day step down  Facility after treatment for alcoholism.  She was not feeling ready just to go to a sober house. However she does not like this facility, has found it difficult to connect with other residents, and feels depressed and lonely. There is no psychiatrist on [facility].  She fells the other women are just there because they are court ordered and  there to get kids back.     Mae is not working - she has to be in house for 2 weeks  - \"stabilization\" - then will be out job hunting - she feels she is will be able to go back to work (at [email or web address] and [email or web address]?) if they take her back.  She is going to see a counselor in [place] in 2 days for EMDR    I wondered if there were anything else " contributing to her depression besides her facility.  She says boyfriend started talking nice to her again and she started answering his phone calls. But then when she started to call hi back he threatens her with the police. She knows how to block his phone calls    Little interest or pleasure in doing things: Nearly every day  Feeling down, depressed, or hopeless: Nearly every day  Trouble falling or staying asleep, or sleeping too much: Several days  Feeling tired or having little energy: Nearly every day  Poor appetite or overeating: Several days  Feeling bad about yourself - or that you are a failure or have let yourself or your family down: Nearly every day  Trouble concentrating on things, such as reading the newspaper or watching television: Several days  Moving or speaking so slowly that other people could have noticed. Or the opposite - being so fidgety or restless that you have been moving around a lot more than usual: Not at all  Thoughts that you would be better off dead, or of hurting yourself in some way: Not at all  PHQ-9 Total Score: 15  If you checked off any problems, how difficult have these problems made it for you to do your work, take care of things at home, or get along with other people?: Very difficult  NOTE score on  1/25/18 was 8    Other concerns:      Arthralgia: She aches everywhere - knees, ankles, hands elbows, shoulder.  This has been going on for a couple of months.  When she gets up in the mornings from bed she can barely move. Ankles and feet were swelling for a couple of months. Takes her an hour to unfreeze  - Mom has lupus ad fibromyalgia and rheumatoid arthritis.    Rash on foot since January, possibly increased sweating.  Itchy - doesn't hurt. Has not tried anything for it    ADHD:She wonder if she should go up on methylphenidate - she can't concentrate      Patient Active Problem List   Diagnosis     Alcohol dependence in remission     MDD (major depressive disorder),  recurrent episode, moderate     ADHD (attention deficit hyperactivity disorder)     Benzodiazepine abuse in remission     Anxiety state, unspecified     Tobacco abuse     Asthma     Cystocele, midline       Current Outpatient Prescriptions on File Prior to Visit   Medication Sig Dispense Refill     albuterol (ACCUNEB) 0.63 mg/3 mL nebulizer solution Take 3 mL (0.63 mg total) by nebulization every 6 (six) hours as needed for wheezing. 100 vial 1     multivitamin therapeutic tablet Take 1 tablet by mouth daily.       SUBOXONE 4-1 mg Film Take 1 Film by mouth 3 (three) times a day.  0     traZODone (DESYREL) 150 MG tablet TAKE 1 TABLET(150 MG) BY MOUTH AT BEDTIME 30 tablet 5     [DISCONTINUED] fluticasone (FLOVENT HFA) 220 mcg/actuation inhaler Inhale 2 puffs as needed. 1 Inhaler 5     Current Facility-Administered Medications on File Prior to Visit   Medication Dose Route Frequency Provider Last Rate Last Dose     naltrexone microspheres injection 380 mg (VIVITROL)  380 mg Intramuscular Once Slime Carter MD               Health Maintenance Due   Topic Date Due     MAMMOGRAM  1970     Health Maintenance reviewed - ordered but not scheduled - we gave her a card to all and schedule.    History   Smoking Status     Current Every Day Smoker     Packs/day: 1.00     Years: 3.00     Types: Cigarettes     Start date: 5/29/2010   Smokeless Tobacco     Never Used     Comment: Wants to quit down the road       History   Alcohol Use No     Comment: hx of ETOH abuse, in remission living in a sober house         Vitals:    03/20/18 1435   BP: (!) 86/54   Pulse: 76   Resp: 16     Body mass index is 24.05 kg/(m^2).     EXAM:    General appearance - alert, well appearing, and in no distress  Mental status -  Depressed /tearful Mood; normal behavior, speech, dress, motor activity, and thought processes  Extremities - no swelling noted of ankles, hands, elbows or knee joints  Skin - small patch of scaling papulopustular  rash plantar side arch of left foot

## 2021-06-17 NOTE — PROGRESS NOTES
Assessment and Plan    1. MDD (major depressive disorder), recurrent episode, moderate  Not well controlled on Lexapro plus duloxetine, though she notes Lexapro is good for anxiety. I'd like her to switch off duloxetine and restart Effexor  - venlafaxine (EFFEXOR XR) 75 MG 24 hr capsule; Take 3 capsules (225 mg total) by mouth daily. Start at 1pill qd for 1 week, then increase to 2 po qd, then  3 po qd after another week  Dispense: 90 capsule; Refill: 1    2. Attention deficit hyperactivity disorder (ADHD), predominantly inattentive type  She cannot take methylphenidate at her treatment facility - letter written     3. Asthma  intermittent  - albuterol (PROAIR HFA;PROVENTIL HFA;VENTOLIN HFA) 90 mcg/actuation inhaler; Inhale 2 puffs every 6 (six) hours as needed for wheezing.  Dispense: 1 each; Refill: 2    4. Anxiety  Well controlled for the most part on Lexapro, but she still appreciates hydroxyzine as a prn  - hydrOXYzine HCl (ATARAX) 50 MG tablet; Take 0.5 tablets (25 mg total) by mouth every 8 (eight) hours as needed for itching.  Dispense: 90 tablet; Refill: 1    5. Pustulosis palmaris et plantaris  - triamcinolone (KENALOG) 0.1 % ointment; Apply topically 3 (three) times a day.  Dispense: 30 g; Refill: 2    Refills:  requested:  6. Acne  - tretinoin (RETIN-A) 0.01 % gel; Apply topically daily. Apply this layer qhs, 30 minutes after washing and patting dry face.  Avoid eyes  Dispense: 45 g; Refill: 1    7. Arthralgia, unspecified joint  Work up negative for auto-immune - likely osteoarthritis at multiple sites  - ibuprofen (ADVIL,MOTRIN) 800 MG tablet; Take 1 tablet (800 mg total) by mouth every 8 (eight) hours as needed for pain (take with food, limit use  to avoid stomach irritation).  Dispense: 100 tablet; Refill: 1        Letter written  To whom it may concern:.     I am the primary care provider or the above named patient. I have made some changes and additions to her medications that I would like you to  "be aware of:      - I support her using prn hydroxyzine for anxiety      - I support her use of albuterol inhaler as needed for asthma symptoms       - I am changing her back to an antidepressant that has been more effective for her in the past: I am discontinuing duloxetine and changing her back to Effexor.  There is no need to taper - it is safe to switch directly from one to the other     Also, she has ADHD and has done well in the past on methylphenidate (\"Ritalin\"), a non amphetamine stimulant.  She is quite depressed now and I believe that some of her depression is related to untreated ADHD. I understand you have protocols about what she is allowed to have, but hope you would reconsider giving her this daily medication. I recognize that it is also possible this is not the best fit for her in terms of treatment facility.    Return in about 4 weeks (around 5/24/2018).    Natty Barone MD     -------------------------------------------    Chief Complaint   Patient presents with     Medication Management     needs to review medications and depression is worsening.  Has kelvin with psych on 5/16/18.  The place that pt is staying at took the multivitamin gummies, Vistaril.  Needs rescue inhaler as well.     Mae comes for a follow up of depression today.  She is at her wits end. She does not like the treatment program in which she voluntarily enrolled herself. Even her therapist has urged her to think about voluntarily discontinuing treatment there, as it seems to be causing more harm than doing good.  She has ever abused her Ritalin, but they have a policy that she is not allowed to take it while there.  She has tried Strattera and has not found this effective. Then they \"expect perfection\" and keep taking her privileges away because \"my room is messy.\" They also \"destroyed\" medications she brought with her and says she needs new prescriptions - for multivitamin, vistaril, and inhaler. She had previously taken " "hydroxyzine as needed for anxiety,. She does feel some support from some of the other women in the program    Saida wonders if she needs to go back on Effexor.  She had been on this for  5 years, but \" when I went to detox they switched it to Lexapro,  later added duloxetine.\" She feels her depression has not been as well controlled since. She has an appointment to see a new psychiatrist on May 16th    I note the following from Up to date on       Regarding intermittent asthma, she  has had a rescue inhaler for 1-2 times a week in the spring/summer, to address \" pollen load.\"    She also says the ketoconazole is not working for the rash on her left foot that I had given at last visit        Patient Active Problem List   Diagnosis     Alcohol dependence in remission     MDD (major depressive disorder), recurrent episode, moderate     ADHD (attention deficit hyperactivity disorder)     Benzodiazepine abuse in remission     Anxiety state, unspecified     Tobacco abuse     Asthma     Cystocele, midline         Current Outpatient Prescriptions:      escitalopram oxalate (LEXAPRO) 20 MG tablet, Take 1 tablet (20 mg total) by mouth daily., Disp: 30 tablet, Rfl: 0     fluticasone (FLOVENT HFA) 220 mcg/actuation inhaler, Inhale 2 puffs as needed., Disp: 1 Inhaler, Rfl: 5     gabapentin (NEURONTIN) 300 MG capsule, Take 2 capsules (600 mg total) by mouth 3 (three) times a day., Disp: 180 capsule, Rfl: 1     ibuprofen (ADVIL,MOTRIN) 800 MG tablet, Take 1 tablet (800 mg total) by mouth every 8 (eight) hours as needed for pain (take with food, limit use  to avoid stomach irritation)., Disp: 100 tablet, Rfl: 1     ketoconazole (NIZORAL) 2 % cream, Apply BID to foot rash, Disp: 15 g, Rfl: 1     menthol-zinc oxide (MENTHOL-ZINC OXIDE) 0.15-1 % Powd powder, Use daily as needed to keep feet dry, Disp: 113 g, Rfl: 0     multivitamin therapeutic tablet, Take 1 tablet by mouth daily., Disp: , Rfl:      SUBOXONE 4-1 mg Film, Take 1 Film " "by mouth 3 (three) times a day., Disp: , Rfl: 0     tretinoin (RETIN-A) 0.01 % gel, Apply topically daily. Apply this layer qhs, 30 minutes after washing and patting dry face.  Avoid eyes, Disp: 45 g, Rfl: 1     albuterol (PROAIR HFA;PROVENTIL HFA;VENTOLIN HFA) 90 mcg/actuation inhaler, Inhale 2 puffs every 6 (six) hours as needed for wheezing., Disp: 1 each, Rfl: 2     hydrOXYzine HCl (ATARAX) 50 MG tablet, Take 0.5 tablets (25 mg total) by mouth every 8 (eight) hours as needed for itching., Disp: 90 tablet, Rfl: 1     methylphenidate HCl (RITALIN) 5 MG tablet, Take 1 tablet (5 mg total) by mouth 2 (two) times a day., Disp: 60 tablet, Rfl: 0     triamcinolone (KENALOG) 0.1 % ointment, Apply topically 3 (three) times a day., Disp: 30 g, Rfl: 2     venlafaxine (EFFEXOR XR) 75 MG 24 hr capsule, Take 3 capsules (225 mg total) by mouth daily. Start at 1pill qd for 1 week, then increase to 2 po qd, then  3 po qd after another week, Disp: 90 capsule, Rfl: 1    Current Facility-Administered Medications:      naltrexone microspheres injection 380 mg (VIVITROL), 380 mg, Intramuscular, Once, Slime Carter MD      History   Smoking Status     Current Every Day Smoker     Packs/day: 1.00     Years: 3.00     Types: Cigarettes     Start date: 5/29/2010   Smokeless Tobacco     Never Used     Comment: Wants to quit down the road       History   Alcohol Use No     Comment: hx of ETOH abuse, in remission living in a sober house       Vitals:    04/26/18 0929   BP: 90/64   Pulse: 65   SpO2: 98%     Body mass index is 24.57 kg/(m^2).     EXAM:    General appearance - alert, well appearing, and in no distress  Mental status - tearful, depressed; but good eye contact, normal though process, well groomed and dressed   Chest - clear to ausculation  Skin - the formerly flaky rash on the lateral side of her left foot has now formed pustules.  We looked at a picture of palmar-plantar pustulosis and she agree - \"that's it.\"      "

## 2022-06-06 NOTE — PLAN OF CARE
Problem: Depressive Symptoms  Goal: Depressive Symptoms  Signs and symptoms of listed problems will be absent or manageable.   Outcome: No Change  Pt was calm and cooperative throughout the shift. Social and pleasant with staff and peers. Feeling more comfortable since a pt with poor boundaries discharged this afternoon. Paced the hallways and listened to music, engaged in conversation with staff and peers, attended groups and participated appropriately. Pt is hopeful that the  at [facility] will allow her to come back to complete her CD treatment, and is hopeful that the doctor can advocate for her to return as she feels she is ready to make a change. Unsure of what her plan is if she is not welcome back at [facility]. Pt is somewhat tense in this aspect, but otherwise showed great insight and bright throughout the evening. Encouraged that she had no crying spells this afternoon.       Procedure rescheduled for 7/13. Lovenox letter created based off of previous recommendations.  Her repeat INR after procedure would land on 7/16 or 7/17, which is over the weekend. Please review and advise.